# Patient Record
Sex: MALE | Race: WHITE | Employment: FULL TIME | URBAN - METROPOLITAN AREA
[De-identification: names, ages, dates, MRNs, and addresses within clinical notes are randomized per-mention and may not be internally consistent; named-entity substitution may affect disease eponyms.]

---

## 2017-09-20 ENCOUNTER — HOSPITAL ENCOUNTER (OUTPATIENT)
Age: 47
Discharge: HOME | End: 2017-09-20
Payer: COMMERCIAL

## 2017-09-20 LAB
PROSTATE SPECIFIC ANTIGEN: 1.57 UG/L
SEX HORMONE BINDING GLOBULIN: 30 NMOL/L (ref 11–80)
TESTOSTERONE FREE-NONMALE: 100.2 PG/ML (ref 47–244)
TESTOSTERONE TOTAL: 458 NG/DL (ref 220–1000)

## 2017-09-20 PROCEDURE — 84270 ASSAY OF SEX HORMONE GLOBUL: CPT

## 2017-09-20 PROCEDURE — 36415 COLL VENOUS BLD VENIPUNCTURE: CPT

## 2017-09-20 PROCEDURE — 84403 ASSAY OF TOTAL TESTOSTERONE: CPT

## 2017-09-20 PROCEDURE — G0103 PSA SCREENING: HCPCS

## 2021-05-07 ENCOUNTER — HOSPITAL ENCOUNTER (OUTPATIENT)
Age: 51
Discharge: HOME | End: 2021-05-07
Payer: COMMERCIAL

## 2021-05-07 LAB
ABSOLUTE EOS #: 0.1 K/UL (ref 0–0.4)
ABSOLUTE IMMATURE GRANULOCYTE: NORMAL K/UL (ref 0–0.3)
ABSOLUTE LYMPH #: 2 K/UL (ref 1–4.8)
ABSOLUTE MONO #: 0.4 K/UL (ref 0.1–1.3)
ALBUMIN SERPL-MCNC: 4.6 G/DL (ref 3.5–5.2)
ALBUMIN/GLOBULIN RATIO: ABNORMAL (ref 1–2.5)
ALP BLD-CCNC: 90 U/L (ref 40–129)
ALT SERPL-CCNC: 25 U/L (ref 5–41)
ANION GAP SERPL CALCULATED.3IONS-SCNC: 12 MMOL/L (ref 9–17)
AST SERPL-CCNC: 20 U/L
BASOPHILS # BLD: 1 % (ref 0–2)
BASOPHILS ABSOLUTE: 0 K/UL (ref 0–0.2)
BILIRUB SERPL-MCNC: 0.46 MG/DL (ref 0.3–1.2)
BUN BLDV-MCNC: 9 MG/DL (ref 6–20)
BUN/CREAT BLD: ABNORMAL (ref 9–20)
CALCIUM SERPL-MCNC: 9 MG/DL (ref 8.6–10.4)
CHLORIDE BLD-SCNC: 105 MMOL/L (ref 98–107)
CHOLESTEROL/HDL RATIO: 3.6
CHOLESTEROL: 228 MG/DL
CO2: 23 MMOL/L (ref 20–31)
CREAT SERPL-MCNC: 0.74 MG/DL (ref 0.7–1.2)
DIFFERENTIAL TYPE: NORMAL
EOSINOPHILS RELATIVE PERCENT: 2 % (ref 0–4)
GFR AFRICAN AMERICAN: >60 ML/MIN
GFR NON-AFRICAN AMERICAN: >60 ML/MIN
GFR SERPL CREATININE-BSD FRML MDRD: ABNORMAL ML/MIN/{1.73_M2}
GFR SERPL CREATININE-BSD FRML MDRD: ABNORMAL ML/MIN/{1.73_M2}
GLUCOSE BLD-MCNC: 106 MG/DL (ref 70–99)
HCT VFR BLD CALC: 46.5 % (ref 41–53)
HDLC SERPL-MCNC: 63 MG/DL
HEMOGLOBIN: 15.4 G/DL (ref 13.5–17.5)
IMMATURE GRANULOCYTES: NORMAL %
LDL CHOLESTEROL: 142 MG/DL (ref 0–130)
LYMPHOCYTES # BLD: 34 % (ref 24–44)
MCH RBC QN AUTO: 31 PG (ref 26–34)
MCHC RBC AUTO-ENTMCNC: 33.1 G/DL (ref 31–37)
MCV RBC AUTO: 93.8 FL (ref 80–100)
MONOCYTES # BLD: 7 % (ref 1–7)
NRBC AUTOMATED: NORMAL PER 100 WBC
PDW BLD-RTO: 13.1 % (ref 11.5–14.9)
PLATELET # BLD: 245 K/UL (ref 150–450)
PLATELET ESTIMATE: NORMAL
PMV BLD AUTO: 7.7 FL (ref 6–12)
POTASSIUM SERPL-SCNC: 4.4 MMOL/L (ref 3.7–5.3)
PROSTATE SPECIFIC ANTIGEN: 1.41 UG/L
RBC # BLD: 4.96 M/UL (ref 4.5–5.9)
RBC # BLD: NORMAL 10*6/UL
SEG NEUTROPHILS: 56 % (ref 36–66)
SEGMENTED NEUTROPHILS ABSOLUTE COUNT: 3.2 K/UL (ref 1.3–9.1)
SODIUM BLD-SCNC: 140 MMOL/L (ref 135–144)
TOTAL PROTEIN: 7.5 G/DL (ref 6.4–8.3)
TRIGL SERPL-MCNC: 116 MG/DL
VLDLC SERPL CALC-MCNC: ABNORMAL MG/DL (ref 1–30)
WBC # BLD: 5.8 K/UL (ref 3.5–11)
WBC # BLD: NORMAL 10*3/UL

## 2021-05-07 PROCEDURE — 36415 COLL VENOUS BLD VENIPUNCTURE: CPT

## 2021-05-07 PROCEDURE — 80053 COMPREHEN METABOLIC PANEL: CPT

## 2021-05-07 PROCEDURE — 80061 LIPID PANEL: CPT

## 2021-05-07 PROCEDURE — G0103 PSA SCREENING: HCPCS

## 2021-05-07 PROCEDURE — 85025 COMPLETE CBC W/AUTO DIFF WBC: CPT

## 2022-08-23 ENCOUNTER — HOSPITAL ENCOUNTER (OUTPATIENT)
Dept: GENERAL RADIOLOGY | Age: 52
Discharge: HOME | DRG: 988 | End: 2022-08-25
Payer: COMMERCIAL

## 2022-08-23 ENCOUNTER — HOSPITAL ENCOUNTER (OUTPATIENT)
Age: 52
Discharge: HOME | DRG: 988 | End: 2022-08-25
Payer: COMMERCIAL

## 2022-08-23 ENCOUNTER — HOSPITAL ENCOUNTER (INPATIENT)
Age: 52
LOS: 4 days | Discharge: HOME | DRG: 988 | End: 2022-08-28
Attending: STUDENT IN AN ORGANIZED HEALTH CARE EDUCATION/TRAINING PROGRAM | Admitting: FAMILY MEDICINE
Payer: COMMERCIAL

## 2022-08-23 ENCOUNTER — HOSPITAL ENCOUNTER (OUTPATIENT)
Age: 52
Discharge: HOME | DRG: 988 | End: 2022-08-23
Payer: COMMERCIAL

## 2022-08-23 DIAGNOSIS — R10.9 STOMACH ACHE: ICD-10-CM

## 2022-08-23 DIAGNOSIS — K62.89 PROCTITIS: Primary | ICD-10-CM

## 2022-08-23 LAB
ABSOLUTE EOS #: 0.1 K/UL (ref 0–0.4)
ABSOLUTE LYMPH #: 1.6 K/UL (ref 1–4.8)
ABSOLUTE MONO #: 0.9 K/UL (ref 0.1–1.3)
ALBUMIN SERPL-MCNC: 4.2 G/DL (ref 3.5–5.2)
ALP BLD-CCNC: 110 U/L (ref 40–129)
ALT SERPL-CCNC: 22 U/L (ref 5–41)
ANION GAP SERPL CALCULATED.3IONS-SCNC: 10 MMOL/L (ref 9–17)
AST SERPL-CCNC: 19 U/L
BASOPHILS # BLD: 1 % (ref 0–2)
BASOPHILS ABSOLUTE: 0.1 K/UL (ref 0–0.2)
BILIRUB SERPL-MCNC: 0.81 MG/DL (ref 0.3–1.2)
BUN BLDV-MCNC: 8 MG/DL (ref 6–20)
CALCIUM SERPL-MCNC: 9.2 MG/DL (ref 8.6–10.4)
CHLORIDE BLD-SCNC: 100 MMOL/L (ref 98–107)
CO2: 28 MMOL/L (ref 20–31)
CREAT SERPL-MCNC: 0.78 MG/DL (ref 0.7–1.2)
EOSINOPHILS RELATIVE PERCENT: 1 % (ref 0–4)
GFR AFRICAN AMERICAN: >60 ML/MIN
GFR NON-AFRICAN AMERICAN: >60 ML/MIN
GFR SERPL CREATININE-BSD FRML MDRD: ABNORMAL ML/MIN/{1.73_M2}
GLUCOSE BLD-MCNC: 121 MG/DL (ref 70–99)
HCT VFR BLD CALC: 41.6 % (ref 41–53)
HEMOGLOBIN: 13.7 G/DL (ref 13.5–17.5)
LYMPHOCYTES # BLD: 18 % (ref 24–44)
MCH RBC QN AUTO: 31 PG (ref 26–34)
MCHC RBC AUTO-ENTMCNC: 32.8 G/DL (ref 31–37)
MCV RBC AUTO: 94.4 FL (ref 80–100)
MONOCYTES # BLD: 10 % (ref 1–7)
PDW BLD-RTO: 13.2 % (ref 11.5–14.9)
PLATELET # BLD: 228 K/UL (ref 150–450)
PMV BLD AUTO: 7.9 FL (ref 6–12)
POTASSIUM SERPL-SCNC: 4.1 MMOL/L (ref 3.7–5.3)
RBC # BLD: 4.41 M/UL (ref 4.5–5.9)
SEG NEUTROPHILS: 70 % (ref 36–66)
SEGMENTED NEUTROPHILS ABSOLUTE COUNT: 6.2 K/UL (ref 1.3–9.1)
SODIUM BLD-SCNC: 138 MMOL/L (ref 135–144)
TOTAL PROTEIN: 7.2 G/DL (ref 6.4–8.3)
WBC # BLD: 8.8 K/UL (ref 3.5–11)

## 2022-08-23 PROCEDURE — 96375 TX/PRO/DX INJ NEW DRUG ADDON: CPT

## 2022-08-23 PROCEDURE — 36415 COLL VENOUS BLD VENIPUNCTURE: CPT

## 2022-08-23 PROCEDURE — 96361 HYDRATE IV INFUSION ADD-ON: CPT

## 2022-08-23 PROCEDURE — 99285 EMERGENCY DEPT VISIT HI MDM: CPT

## 2022-08-23 PROCEDURE — 96376 TX/PRO/DX INJ SAME DRUG ADON: CPT

## 2022-08-23 PROCEDURE — 85025 COMPLETE CBC W/AUTO DIFF WBC: CPT

## 2022-08-23 PROCEDURE — 80053 COMPREHEN METABOLIC PANEL: CPT

## 2022-08-23 PROCEDURE — 74018 RADEX ABDOMEN 1 VIEW: CPT

## 2022-08-23 PROCEDURE — 96374 THER/PROPH/DIAG INJ IV PUSH: CPT

## 2022-08-23 RX ORDER — ATORVASTATIN CALCIUM 40 MG/1
40 TABLET, FILM COATED ORAL DAILY
COMMUNITY

## 2022-08-23 RX ORDER — CIPROFLOXACIN 500 MG/1
500 TABLET, FILM COATED ORAL 2 TIMES DAILY
Status: ON HOLD | COMMUNITY
End: 2022-08-26 | Stop reason: HOSPADM

## 2022-08-23 ASSESSMENT — LIFESTYLE VARIABLES
HOW MANY STANDARD DRINKS CONTAINING ALCOHOL DO YOU HAVE ON A TYPICAL DAY: 7 TO 9
HOW OFTEN DO YOU HAVE A DRINK CONTAINING ALCOHOL: 4 OR MORE TIMES A WEEK

## 2022-08-23 ASSESSMENT — PAIN - FUNCTIONAL ASSESSMENT: PAIN_FUNCTIONAL_ASSESSMENT: 0-10

## 2022-08-23 ASSESSMENT — PAIN DESCRIPTION - LOCATION: LOCATION: ABDOMEN

## 2022-08-23 ASSESSMENT — PAIN SCALES - GENERAL: PAINLEVEL_OUTOF10: 5

## 2022-08-23 ASSESSMENT — PAIN DESCRIPTION - ORIENTATION: ORIENTATION: LEFT;RIGHT;LOWER

## 2022-08-23 NOTE — LETTER
[address] [facility] [address]  Phone: [phone]             August 28, 2022    Patient: Richard Carrera   YOB: 1970   Date of Visit: 8/23/2022       To Whom It May Concern:    Chey Suarez was seen and treated in our facility beginning 8/23/2022 until 8/28/22.  He cannot return to work until he has followed up with his surgeon and received clearance will be up to two weeks off until 9/12/22      Sincerely,       Mona Hsu RN        Signature:__________________________________

## 2022-08-24 ENCOUNTER — APPOINTMENT (OUTPATIENT)
Dept: CT IMAGING | Age: 52
DRG: 988 | End: 2022-08-24
Payer: COMMERCIAL

## 2022-08-24 PROBLEM — K62.89 PROCTITIS: Status: ACTIVE | Noted: 2022-08-24

## 2022-08-24 LAB
ABSOLUTE EOS #: 0.1 K/UL (ref 0–0.4)
ABSOLUTE LYMPH #: 1.3 K/UL (ref 1–4.8)
ABSOLUTE MONO #: 1.1 K/UL (ref 0.1–1.3)
ALBUMIN SERPL-MCNC: 4.2 G/DL (ref 3.5–5.2)
ALP BLD-CCNC: 110 U/L (ref 40–129)
ALT SERPL-CCNC: 23 U/L (ref 5–41)
ANION GAP SERPL CALCULATED.3IONS-SCNC: 12 MMOL/L (ref 9–17)
AST SERPL-CCNC: 20 U/L
BASOPHILS # BLD: 1 % (ref 0–2)
BASOPHILS ABSOLUTE: 0.1 K/UL (ref 0–0.2)
BILIRUB SERPL-MCNC: 0.69 MG/DL (ref 0.3–1.2)
BILIRUBIN URINE: NEGATIVE
BUN BLDV-MCNC: 8 MG/DL (ref 6–20)
CALCIUM SERPL-MCNC: 9.3 MG/DL (ref 8.6–10.4)
CHLORIDE BLD-SCNC: 100 MMOL/L (ref 98–107)
CO2: 22 MMOL/L (ref 20–31)
COLOR: YELLOW
COMMENT UA: NORMAL
CREAT SERPL-MCNC: 0.68 MG/DL (ref 0.7–1.2)
EOSINOPHILS RELATIVE PERCENT: 1 % (ref 0–4)
GFR AFRICAN AMERICAN: >60 ML/MIN
GFR NON-AFRICAN AMERICAN: >60 ML/MIN
GFR SERPL CREATININE-BSD FRML MDRD: ABNORMAL ML/MIN/{1.73_M2}
GLUCOSE BLD-MCNC: 150 MG/DL (ref 70–99)
GLUCOSE URINE: NEGATIVE
HCT VFR BLD CALC: 41.2 % (ref 41–53)
HEMOGLOBIN: 13.8 G/DL (ref 13.5–17.5)
KETONES, URINE: NEGATIVE
LACTIC ACID: 0.7 MMOL/L (ref 0.5–2.2)
LEUKOCYTE ESTERASE, URINE: NEGATIVE
LIPASE: 15 U/L (ref 13–60)
LYMPHOCYTES # BLD: 12 % (ref 24–44)
MAGNESIUM: 2 MG/DL (ref 1.6–2.6)
MCH RBC QN AUTO: 31.4 PG (ref 26–34)
MCHC RBC AUTO-ENTMCNC: 33.5 G/DL (ref 31–37)
MCV RBC AUTO: 93.7 FL (ref 80–100)
MONOCYTES # BLD: 10 % (ref 1–7)
NITRITE, URINE: NEGATIVE
PDW BLD-RTO: 12.9 % (ref 11.5–14.9)
PH UA: 5 (ref 5–8)
PLATELET # BLD: 238 K/UL (ref 150–450)
PMV BLD AUTO: 7.5 FL (ref 6–12)
POTASSIUM SERPL-SCNC: 3.9 MMOL/L (ref 3.7–5.3)
PROTEIN UA: NEGATIVE
RBC # BLD: 4.4 M/UL (ref 4.5–5.9)
SEG NEUTROPHILS: 76 % (ref 36–66)
SEGMENTED NEUTROPHILS ABSOLUTE COUNT: 8.7 K/UL (ref 1.3–9.1)
SODIUM BLD-SCNC: 134 MMOL/L (ref 135–144)
SPECIFIC GRAVITY UA: 1.02 (ref 1–1.03)
TOTAL PROTEIN: 7.4 G/DL (ref 6.4–8.3)
TURBIDITY: CLEAR
URINE HGB: NEGATIVE
UROBILINOGEN, URINE: NORMAL
WBC # BLD: 11.3 K/UL (ref 3.5–11)

## 2022-08-24 PROCEDURE — 81003 URINALYSIS AUTO W/O SCOPE: CPT

## 2022-08-24 PROCEDURE — 6360000002 HC RX W HCPCS: Performed by: SURGERY

## 2022-08-24 PROCEDURE — 6360000002 HC RX W HCPCS: Performed by: FAMILY MEDICINE

## 2022-08-24 PROCEDURE — C9113 INJ PANTOPRAZOLE SODIUM, VIA: HCPCS | Performed by: SURGERY

## 2022-08-24 PROCEDURE — 2580000003 HC RX 258: Performed by: STUDENT IN AN ORGANIZED HEALTH CARE EDUCATION/TRAINING PROGRAM

## 2022-08-24 PROCEDURE — 2580000003 HC RX 258: Performed by: SURGERY

## 2022-08-24 PROCEDURE — 74177 CT ABD & PELVIS W/CONTRAST: CPT

## 2022-08-24 PROCEDURE — 6360000004 HC RX CONTRAST MEDICATION: Performed by: STUDENT IN AN ORGANIZED HEALTH CARE EDUCATION/TRAINING PROGRAM

## 2022-08-24 PROCEDURE — 6360000002 HC RX W HCPCS: Performed by: STUDENT IN AN ORGANIZED HEALTH CARE EDUCATION/TRAINING PROGRAM

## 2022-08-24 PROCEDURE — 83690 ASSAY OF LIPASE: CPT

## 2022-08-24 PROCEDURE — 36415 COLL VENOUS BLD VENIPUNCTURE: CPT

## 2022-08-24 PROCEDURE — 96376 TX/PRO/DX INJ SAME DRUG ADON: CPT

## 2022-08-24 PROCEDURE — 96374 THER/PROPH/DIAG INJ IV PUSH: CPT

## 2022-08-24 PROCEDURE — 83605 ASSAY OF LACTIC ACID: CPT

## 2022-08-24 PROCEDURE — 99285 EMERGENCY DEPT VISIT HI MDM: CPT

## 2022-08-24 PROCEDURE — 96375 TX/PRO/DX INJ NEW DRUG ADDON: CPT

## 2022-08-24 PROCEDURE — 51702 INSERT TEMP BLADDER CATH: CPT

## 2022-08-24 PROCEDURE — 96361 HYDRATE IV INFUSION ADD-ON: CPT

## 2022-08-24 PROCEDURE — 83735 ASSAY OF MAGNESIUM: CPT

## 2022-08-24 PROCEDURE — 1200000000 HC SEMI PRIVATE

## 2022-08-24 PROCEDURE — A4216 STERILE WATER/SALINE, 10 ML: HCPCS | Performed by: SURGERY

## 2022-08-24 PROCEDURE — 85025 COMPLETE CBC W/AUTO DIFF WBC: CPT

## 2022-08-24 PROCEDURE — 80053 COMPREHEN METABOLIC PANEL: CPT

## 2022-08-24 RX ORDER — MORPHINE SULFATE 4 MG/ML
4 INJECTION, SOLUTION INTRAMUSCULAR; INTRAVENOUS
Status: DISCONTINUED | OUTPATIENT
Start: 2022-08-24 | End: 2022-08-24

## 2022-08-24 RX ORDER — FENTANYL CITRATE 50 UG/ML
50 INJECTION, SOLUTION INTRAMUSCULAR; INTRAVENOUS
Status: DISCONTINUED | OUTPATIENT
Start: 2022-08-24 | End: 2022-08-28 | Stop reason: HOSPADM

## 2022-08-24 RX ORDER — SODIUM CHLORIDE 9 MG/ML
INJECTION, SOLUTION INTRAVENOUS PRN
Status: DISCONTINUED | OUTPATIENT
Start: 2022-08-24 | End: 2022-08-28 | Stop reason: HOSPADM

## 2022-08-24 RX ORDER — ONDANSETRON 4 MG/1
4 TABLET, ORALLY DISINTEGRATING ORAL EVERY 8 HOURS PRN
Status: DISCONTINUED | OUTPATIENT
Start: 2022-08-24 | End: 2022-08-24 | Stop reason: SDUPTHER

## 2022-08-24 RX ORDER — SODIUM CHLORIDE 9 MG/ML
INJECTION, SOLUTION INTRAVENOUS CONTINUOUS
Status: DISCONTINUED | OUTPATIENT
Start: 2022-08-24 | End: 2022-08-28 | Stop reason: HOSPADM

## 2022-08-24 RX ORDER — FENTANYL CITRATE 50 UG/ML
100 INJECTION, SOLUTION INTRAMUSCULAR; INTRAVENOUS
Status: DISCONTINUED | OUTPATIENT
Start: 2022-08-24 | End: 2022-08-28 | Stop reason: HOSPADM

## 2022-08-24 RX ORDER — ACETAMINOPHEN 325 MG/1
650 TABLET ORAL EVERY 4 HOURS PRN
Status: DISCONTINUED | OUTPATIENT
Start: 2022-08-24 | End: 2022-08-24 | Stop reason: SDUPTHER

## 2022-08-24 RX ORDER — 0.9 % SODIUM CHLORIDE 0.9 %
100 INTRAVENOUS SOLUTION INTRAVENOUS ONCE
Status: COMPLETED | OUTPATIENT
Start: 2022-08-24 | End: 2022-08-24

## 2022-08-24 RX ORDER — SODIUM CHLORIDE 0.9 % (FLUSH) 0.9 %
10 SYRINGE (ML) INJECTION PRN
Status: DISCONTINUED | OUTPATIENT
Start: 2022-08-24 | End: 2022-08-24

## 2022-08-24 RX ORDER — SODIUM CHLORIDE 0.9 % (FLUSH) 0.9 %
5-40 SYRINGE (ML) INJECTION PRN
Status: DISCONTINUED | OUTPATIENT
Start: 2022-08-24 | End: 2022-08-28 | Stop reason: HOSPADM

## 2022-08-24 RX ORDER — 0.9 % SODIUM CHLORIDE 0.9 %
1000 INTRAVENOUS SOLUTION INTRAVENOUS ONCE
Status: COMPLETED | OUTPATIENT
Start: 2022-08-24 | End: 2022-08-24

## 2022-08-24 RX ORDER — FENTANYL CITRATE 50 UG/ML
100 INJECTION, SOLUTION INTRAMUSCULAR; INTRAVENOUS
Status: DISCONTINUED | OUTPATIENT
Start: 2022-08-24 | End: 2022-08-24

## 2022-08-24 RX ORDER — ONDANSETRON 2 MG/ML
4 INJECTION INTRAMUSCULAR; INTRAVENOUS EVERY 6 HOURS PRN
Status: DISCONTINUED | OUTPATIENT
Start: 2022-08-24 | End: 2022-08-28 | Stop reason: HOSPADM

## 2022-08-24 RX ORDER — ACETAMINOPHEN 325 MG/1
650 TABLET ORAL EVERY 4 HOURS PRN
Status: DISCONTINUED | OUTPATIENT
Start: 2022-08-24 | End: 2022-08-28 | Stop reason: HOSPADM

## 2022-08-24 RX ORDER — SODIUM CHLORIDE 0.9 % (FLUSH) 0.9 %
5-40 SYRINGE (ML) INJECTION EVERY 12 HOURS SCHEDULED
Status: DISCONTINUED | OUTPATIENT
Start: 2022-08-24 | End: 2022-08-28 | Stop reason: HOSPADM

## 2022-08-24 RX ORDER — ONDANSETRON 2 MG/ML
4 INJECTION INTRAMUSCULAR; INTRAVENOUS EVERY 6 HOURS PRN
Status: DISCONTINUED | OUTPATIENT
Start: 2022-08-24 | End: 2022-08-24 | Stop reason: SDUPTHER

## 2022-08-24 RX ORDER — ENOXAPARIN SODIUM 100 MG/ML
30 INJECTION SUBCUTANEOUS 2 TIMES DAILY
Status: DISCONTINUED | OUTPATIENT
Start: 2022-08-24 | End: 2022-08-26

## 2022-08-24 RX ORDER — MORPHINE SULFATE 2 MG/ML
2 INJECTION, SOLUTION INTRAMUSCULAR; INTRAVENOUS
Status: DISCONTINUED | OUTPATIENT
Start: 2022-08-24 | End: 2022-08-24

## 2022-08-24 RX ORDER — FENTANYL CITRATE 50 UG/ML
50 INJECTION, SOLUTION INTRAMUSCULAR; INTRAVENOUS
Status: DISCONTINUED | OUTPATIENT
Start: 2022-08-24 | End: 2022-08-24

## 2022-08-24 RX ORDER — MORPHINE SULFATE 4 MG/ML
4 INJECTION, SOLUTION INTRAMUSCULAR; INTRAVENOUS
Status: COMPLETED | OUTPATIENT
Start: 2022-08-24 | End: 2022-08-24

## 2022-08-24 RX ORDER — MORPHINE SULFATE 4 MG/ML
4 INJECTION, SOLUTION INTRAMUSCULAR; INTRAVENOUS ONCE
Status: COMPLETED | OUTPATIENT
Start: 2022-08-24 | End: 2022-08-24

## 2022-08-24 RX ADMIN — FENTANYL CITRATE 100 MCG: 50 INJECTION, SOLUTION INTRAMUSCULAR; INTRAVENOUS at 19:02

## 2022-08-24 RX ADMIN — MORPHINE SULFATE 4 MG: 4 INJECTION, SOLUTION INTRAMUSCULAR; INTRAVENOUS at 00:58

## 2022-08-24 RX ADMIN — PIPERACILLIN AND TAZOBACTAM 3375 MG: 3; .375 INJECTION, POWDER, FOR SOLUTION INTRAVENOUS at 12:49

## 2022-08-24 RX ADMIN — SODIUM CHLORIDE, PRESERVATIVE FREE 10 ML: 5 INJECTION INTRAVENOUS at 02:36

## 2022-08-24 RX ADMIN — FENTANYL CITRATE 100 MCG: 50 INJECTION, SOLUTION INTRAMUSCULAR; INTRAVENOUS at 21:17

## 2022-08-24 RX ADMIN — FENTANYL CITRATE 100 MCG: 50 INJECTION, SOLUTION INTRAMUSCULAR; INTRAVENOUS at 12:28

## 2022-08-24 RX ADMIN — IOPAMIDOL 75 ML: 755 INJECTION, SOLUTION INTRAVENOUS at 02:36

## 2022-08-24 RX ADMIN — MORPHINE SULFATE 4 MG: 4 INJECTION, SOLUTION INTRAMUSCULAR; INTRAVENOUS at 02:19

## 2022-08-24 RX ADMIN — ENOXAPARIN SODIUM 30 MG: 100 INJECTION SUBCUTANEOUS at 21:17

## 2022-08-24 RX ADMIN — PIPERACILLIN AND TAZOBACTAM 3375 MG: 3; .375 INJECTION, POWDER, FOR SOLUTION INTRAVENOUS at 21:19

## 2022-08-24 RX ADMIN — HYDROMORPHONE HYDROCHLORIDE 1 MG: 1 INJECTION, SOLUTION INTRAMUSCULAR; INTRAVENOUS; SUBCUTANEOUS at 04:10

## 2022-08-24 RX ADMIN — SODIUM CHLORIDE 40 MG: 9 INJECTION, SOLUTION INTRAMUSCULAR; INTRAVENOUS; SUBCUTANEOUS at 08:31

## 2022-08-24 RX ADMIN — SODIUM CHLORIDE 1000 ML: 9 INJECTION, SOLUTION INTRAVENOUS at 03:40

## 2022-08-24 RX ADMIN — SODIUM CHLORIDE 100 ML: 9 INJECTION, SOLUTION INTRAVENOUS at 02:36

## 2022-08-24 RX ADMIN — PIPERACILLIN AND TAZOBACTAM 4500 MG: 4; .5 INJECTION, POWDER, FOR SOLUTION INTRAVENOUS at 03:58

## 2022-08-24 RX ADMIN — FENTANYL CITRATE 100 MCG: 50 INJECTION, SOLUTION INTRAMUSCULAR; INTRAVENOUS at 08:31

## 2022-08-24 RX ADMIN — SODIUM CHLORIDE: 9 INJECTION, SOLUTION INTRAVENOUS at 12:46

## 2022-08-24 RX ADMIN — SODIUM CHLORIDE: 9 INJECTION, SOLUTION INTRAVENOUS at 08:34

## 2022-08-24 RX ADMIN — SODIUM CHLORIDE 1000 ML: 9 INJECTION, SOLUTION INTRAVENOUS at 00:58

## 2022-08-24 ASSESSMENT — PAIN DESCRIPTION - DESCRIPTORS
DESCRIPTORS: ACHING;SHARP
DESCRIPTORS: CRAMPING
DESCRIPTORS: ACHING;STABBING;THROBBING
DESCRIPTORS: ACHING;STABBING
DESCRIPTORS: CRAMPING

## 2022-08-24 ASSESSMENT — ENCOUNTER SYMPTOMS
EYE REDNESS: 0
SHORTNESS OF BREATH: 0
CHEST TIGHTNESS: 0
CONSTIPATION: 1
RHINORRHEA: 0
EYE DISCHARGE: 0
NAUSEA: 0
VOMITING: 0
SORE THROAT: 0
DIARRHEA: 0
ABDOMINAL PAIN: 1

## 2022-08-24 ASSESSMENT — PAIN DESCRIPTION - LOCATION
LOCATION: ABDOMEN
LOCATION: BUTTOCKS;ABDOMEN
LOCATION: ABDOMEN

## 2022-08-24 ASSESSMENT — PAIN DESCRIPTION - ORIENTATION
ORIENTATION: LEFT
ORIENTATION: LOWER
ORIENTATION: LOWER
ORIENTATION: MID
ORIENTATION: LOWER

## 2022-08-24 ASSESSMENT — PAIN SCALES - GENERAL
PAINLEVEL_OUTOF10: 8
PAINLEVEL_OUTOF10: 7
PAINLEVEL_OUTOF10: 9
PAINLEVEL_OUTOF10: 7

## 2022-08-24 NOTE — H&P
[facility]    HISTORY AND PHYSICAL EXAMINATION            Date:   8/24/2022  Patient name:  Rd Cohen  Date of admission:  8/23/2022 11:55 PM  MRN:   [id number]  Account:  [de-identified]  YOB: 1970  PCP:    Kt Castro DO  Room:   2045/2045-01  Code Status:    Full Code    Chief Complaint:     Chief Complaint   Patient presents with    Abdominal Pain    Constipation       History Obtained From:     patient, electronic medical record    History of Present Illness:     Rd Cohen is a 46 y.o. Non- / non  male who presents with Abdominal Pain and Constipation   and is admitted to the hospital for the management of Proctitis. Location-lowe abdomen  Worse with-nothing  Relieved with-pain medications  Quality- pressure like  Severity-moderate  Duration-4-5 days  Timing-continuous  Patient states that he felt constipated and took stool softeners and laxative but did not improve his symptoms, he did have small bowel movement yesterday and passed some gas. On arrival to the ER, his vitals were found to be stable. Labs  were unremarkable. CT abdomen showed findings compatible with severe proctitis. A small rectal abscess. No bowel obstruction. Past Medical History:     Past Medical History:   Diagnosis Date    GERD (gastroesophageal reflux disease)     not treated        Past Surgical History:     Past Surgical History:   Procedure Laterality Date    ABDOMEN SURGERY      laporoscopy  for acid reflux    BACK SURGERY      x3    OTHER SURGICAL HISTORY N/A 12 19 13    post cervical laminectomy C5-6 C 6-7    VASECTOMY          Medications Prior to Admission:     Prior to Admission medications    Medication Sig Start Date End Date Taking?  Authorizing Provider   atorvastatin (LIPITOR) 40 MG tablet Take 40 mg by mouth daily   Yes Historical Provider, MD   ciprofloxacin (CIPRO) 500 MG tablet Take 500 mg by mouth 2 times daily   Yes Historical Provider, MD   naproxen (NAPROSYN) 500 MG tablet Take 1 tablet by mouth 2 times daily (with meals). Lauraine Aver to resume on 12/27  Patient not taking: Reported on 8/23/2022 12/20/13   Devaughn Collet, MD   oxyCODONE-acetaminophen (PERCOCET) 5-325 MG per tablet 1-2 every 4-6 hours only as needed for pain (watch for sedation, constipation and nausea- ok to add  Laxative as needed)  Patient not taking: Reported on 8/23/2022 12/20/13   Devaughn Collet, MD   metaxalone (SKELAXIN) 800 MG tablet Take 800 mg by mouth 3 times daily. Patient not taking: Reported on 8/23/2022    Historical Provider, MD   varenicline (CHANTIX) 1 MG tablet Take 1 mg by mouth 2 times daily. Patient not taking: Reported on 8/23/2022    Historical Provider, MD        Allergies:     Patient has no known allergies. Social History:     Tobacco:    reports that he has quit smoking. His smoking use included cigarettes. He has a 30.00 pack-year smoking history. He does not have any smokeless tobacco history on file. Alcohol:      reports current alcohol use of about 5.0 standard drinks per week. Drug Use:  reports that he does not currently use drugs. Family History:     Family History   Problem Relation Age of Onset    Diabetes Mother     Cancer Father        Review of Systems:     Positive and Negative as described in HPI.     CONSTITUTIONAL:  negative for fevers, chills, sweats, fatigue, weight loss  HEENT:  negative for vision, hearing changes, runny nose, throat pain  RESPIRATORY:  negative for shortness of breath, cough, congestion, wheezing  CARDIOVASCULAR:  negative for chest pain, palpitations  GASTROINTESTINAL:  negative for nausea, vomiting, diarrhea, constipation, change in bowel habits, abdominal pain   GENITOURINARY:  negative for difficulty of urination, burning with urination, frequency   INTEGUMENT:  negative for rash, skin lesions, easy bruising   HEMATOLOGIC/LYMPHATIC:  negative for swelling/edema   ALLERGIC/IMMUNOLOGIC: MCHC 32.8 31 - 37 g/dL    RDW 13.2 11.5 - 14.9 %    Platelets 252 652 - 124 k/uL    MPV 7.9 6.0 - 12.0 fL    Seg Neutrophils 70 (H) 36 - 66 %    Lymphocytes 18 (L) 24 - 44 %    Monocytes 10 (H) 1 - 7 %    Eosinophils % 1 0 - 4 %    Basophils 1 0 - 2 %    Segs Absolute 6.20 1.3 - 9.1 k/uL    Absolute Lymph # 1.60 1.0 - 4.8 k/uL    Absolute Mono # 0.90 0.1 - 1.3 k/uL    Absolute Eos # 0.10 0.0 - 0.4 k/uL    Basophils Absolute 0.10 0.0 - 0.2 k/uL   Comprehensive Metabolic Panel    Collection Time: 08/23/22 10:00 AM   Result Value Ref Range    Glucose 121 (H) 70 - 99 mg/dL    BUN 8 6 - 20 mg/dL    Creatinine 0.78 0.70 - 1.20 mg/dL    Calcium 9.2 8.6 - 10.4 mg/dL    Sodium 138 135 - 144 mmol/L    Potassium 4.1 3.7 - 5.3 mmol/L    Chloride 100 98 - 107 mmol/L    CO2 28 20 - 31 mmol/L    Anion Gap 10 9 - 17 mmol/L    Alkaline Phosphatase 110 40 - 129 U/L    ALT 22 5 - 41 U/L    AST 19 <40 U/L    Total Bilirubin 0.81 0.3 - 1.2 mg/dL    Total Protein 7.2 6.4 - 8.3 g/dL    Albumin 4.2 3.5 - 5.2 g/dL    GFR Non-African American >60 >60 mL/min    GFR African American >60 >60 mL/min    GFR Comment         CBC with Auto Differential    Collection Time: 08/24/22  1:05 AM   Result Value Ref Range    WBC 11.3 (H) 3.5 - 11.0 k/uL    RBC 4.40 (L) 4.5 - 5.9 m/uL    Hemoglobin 13.8 13.5 - 17.5 g/dL    Hematocrit 41.2 41 - 53 %    MCV 93.7 80 - 100 fL    MCH 31.4 26 - 34 pg    MCHC 33.5 31 - 37 g/dL    RDW 12.9 11.5 - 14.9 %    Platelets 661 615 - 585 k/uL    MPV 7.5 6.0 - 12.0 fL    Seg Neutrophils 76 (H) 36 - 66 %    Lymphocytes 12 (L) 24 - 44 %    Monocytes 10 (H) 1 - 7 %    Eosinophils % 1 0 - 4 %    Basophils 1 0 - 2 %    Segs Absolute 8.70 1.3 - 9.1 k/uL    Absolute Lymph # 1.30 1.0 - 4.8 k/uL    Absolute Mono # 1.10 0.1 - 1.3 k/uL    Absolute Eos # 0.10 0.0 - 0.4 k/uL    Basophils Absolute 0.10 0.0 - 0.2 k/uL   CMP    Collection Time: 08/24/22  1:05 AM   Result Value Ref Range    Glucose 150 (H) 70 - 99 mg/dL    BUN 8 6 - 20 mg/dL    Creatinine 0.68 (L) 0.70 - 1.20 mg/dL    Calcium 9.3 8.6 - 10.4 mg/dL    Sodium 134 (L) 135 - 144 mmol/L    Potassium 3.9 3.7 - 5.3 mmol/L    Chloride 100 98 - 107 mmol/L    CO2 22 20 - 31 mmol/L    Anion Gap 12 9 - 17 mmol/L    Alkaline Phosphatase 110 40 - 129 U/L    ALT 23 5 - 41 U/L    AST 20 <40 U/L    Total Bilirubin 0.69 0.3 - 1.2 mg/dL    Total Protein 7.4 6.4 - 8.3 g/dL    Albumin 4.2 3.5 - 5.2 g/dL    GFR Non-African American >60 >60 mL/min    GFR African American >60 >60 mL/min    GFR Comment         Lipase    Collection Time: 08/24/22  1:05 AM   Result Value Ref Range    Lipase 15 13 - 60 U/L   Magnesium    Collection Time: 08/24/22  1:05 AM   Result Value Ref Range    Magnesium 2.0 1.6 - 2.6 mg/dL   Lactic Acid    Collection Time: 08/24/22  1:05 AM   Result Value Ref Range    Lactic Acid 0.7 0.5 - 2.2 mmol/L   Urinalysis with Reflex to Culture    Collection Time: 08/24/22  2:02 AM    Specimen: Urine, clean catch   Result Value Ref Range    Color, UA Yellow Yellow    Turbidity UA Clear Clear    Glucose, Ur NEGATIVE NEGATIVE    Bilirubin Urine NEGATIVE NEGATIVE    Ketones, Urine NEGATIVE NEGATIVE    Specific [place], UA 1.016 1.000 - 1.030    Urine Hgb NEGATIVE NEGATIVE    pH, UA 5.0 5.0 - 8.0    Protein, UA NEGATIVE NEGATIVE    Urobilinogen, Urine Normal Normal    Nitrite, Urine NEGATIVE NEGATIVE    Leukocyte Esterase, Urine NEGATIVE NEGATIVE    Urinalysis Comments       Microscopic exam not performed based on chemical results unless requested in original order. Imaging/Diagnostics:  XR ABDOMEN (KUB) (SINGLE AP VIEW)    Result Date: 8/23/2022  Nonspecific KUB     CT ABDOMEN PELVIS W IV CONTRAST Additional Contrast? None    Result Date: 8/24/2022  1. Findings compatible with severe proctitis of the distal rectum.   A small perirectal abscess may present just superior to the anal verge at the 6 o'clock position, although visualization is limited and this also could represent intraluminal

## 2022-08-24 NOTE — PLAN OF CARE
Problem: Discharge Planning  Goal: Discharge to home or other facility with appropriate resources  8/24/2022 1656 by Morris Jacobsen RN  Outcome: Progressing  8/24/2022 0617 by Grace Mane RN  Outcome: Progressing     Problem: Pain  Goal: Verbalizes/displays adequate comfort level or baseline comfort level  8/24/2022 1656 by Morris Jacobsen RN  Outcome: Progressing  8/24/2022 0617 by rGace Mane RN  Outcome: Progressing

## 2022-08-24 NOTE — ED NOTES
Report given to ZINA johnson from ed. Report method by phone   The following was reviewed with receiving RN:   Current vital signs:  /68   Pulse 78   Temp 98.5 °F (36.9 °C) (Oral)   Resp 20   Ht 6' 1\" (1.854 m)   Wt 246 lb (111.6 kg)   SpO2 93%   BMI 32.46 kg/m²                      Any medication or safety alerts were reviewed. Any pending diagnostics and notifications were also reviewed, as well as any safety concerns or issues, abnormal labs, abnormal imaging, and abnormal assessment findings. Questions were answered.             Eric North RN  08/24/22 9197

## 2022-08-24 NOTE — ED TRIAGE NOTES
Pt was having pressure for the whole last week rectally and scrotal. Pt was seen by Dr. Alexandre Thomas. Pt had last BM last thursday. Pt was told his prostate was inflamed. Pt took prune juice and had a BM movement, liquid. Lower abdominal pain bilaterally. Pt states he noted a fever 101.4 tonight.  Pt had x-rays completed today prior to miralax

## 2022-08-24 NOTE — CARE COORDINATION
CASE MANAGEMENT NOTE:    Admission Date:  8/23/2022 Naila Burgess is a 46 y.o.  male    Admitted for : Proctitis [K62.89]    Met with:  Patient    PCP:  [address]:  [facility]      Is patient alert and oriented at time of discussion:  Yes    Current Residence/ Living Arrangements:  independently at home             Current Services PTA:  No    Does patient go to outpatient dialysis: No  If yes, location and chair time: NA  Who is their nephrologist? NA    Is patient agreeable to VNS: No    Freedom of choice provided:  No    List of [address] provided: No    VNS chosen:  No    DME:  none    Home Oxygen: No    Nebulizer: No    CPAP/BIPAP: No    Supplier: N/A    Potential Assistance Needed: No    SNF needed: No    Freedom of choice and list provided: No    Pharmacy:  [facility]        Is patient currently receiving oral anticoagulation therapy? No    Is the Patient an [facility] [place] with Readmission Risk Score greater than 14%? No  If yes, pt needs a follow up appointment made within 7 days. Family Members/Caregivers that pt would like involved in their care:    Yes    If yes, list name here:  spouse Jacobo Barraza    Transportation Provider:  Family             Discharge Plan:  8/24/22 [facility] From home with spouse, independent DME:none VNS:none pt denies needs for discharge. Clear liquids, consult to Dr Eather Ormond , IV zosyn.  Following for needs//JF                Electronically signed by: Victoriano Maravilla RN on 8/24/2022 at 12:04 PM

## 2022-08-24 NOTE — ED PROVIDER NOTES
EMERGENCY DEPARTMENT ENCOUNTER    Pt Name: Chantel Sawyer  MRN: [id number]  [place] 1970  Date of evaluation: 8/24/22  CHIEF COMPLAINT       Chief Complaint   Patient presents with    Abdominal Pain    Constipation     HISTORY OF PRESENT ILLNESS   51-year-old presents with abdominal pain    Lower abdominal pain    Difficulty with bowel movements for several days    Having some loose bowel meds occasionally    Had a fever this evening so came in    No dysuria hematuria frequency    No chest pain, shortness of breath    Lower moderate abdominal pain nonradiating            REVIEW OF SYSTEMS     Review of Systems   Constitutional:  Negative for chills and fever. HENT:  Negative for rhinorrhea and sore throat. Eyes:  Negative for discharge and redness. Respiratory:  Negative for chest tightness and shortness of breath. Cardiovascular:  Negative for chest pain. Gastrointestinal:  Positive for abdominal pain and constipation. Negative for diarrhea, nausea and vomiting. Genitourinary:  Negative for dysuria and frequency. Musculoskeletal:  Negative for arthralgias and myalgias. Skin:  Negative for rash. Neurological:  Negative for weakness and numbness. Psychiatric/Behavioral:  Negative for suicidal ideas. All other systems reviewed and are negative.   PASTMEDICAL HISTORY     Past Medical History:   Diagnosis Date    GERD (gastroesophageal reflux disease)     not treated     Past Problem List  Patient Active Problem List   Diagnosis Code    Herniated cervical disc without myelopathy M50.20    Proctitis K62.89     SURGICAL HISTORY       Past Surgical History:   Procedure Laterality Date    ABDOMEN SURGERY      laporoscopy  for acid reflux    BACK SURGERY      x3    OTHER SURGICAL HISTORY N/A 12 19 13    post cervical laminectomy C5-6 C 6-7    VASECTOMY       CURRENT MEDICATIONS       Previous Medications    ATORVASTATIN (LIPITOR) 40 MG TABLET    Take 40 mg by mouth daily    CIPROFLOXACIN (CIPRO) 500 MG TABLET    Take 500 mg by mouth 2 times daily    METAXALONE (SKELAXIN) 800 MG TABLET    Take 800 mg by mouth 3 times daily. NAPROXEN (NAPROSYN) 500 MG TABLET    Take 1 tablet by mouth 2 times daily (with meals). Ok to resume on 12/27    OXYCODONE-ACETAMINOPHEN (PERCOCET) 5-325 MG PER TABLET    1-2 every 4-6 hours only as needed for pain (watch for sedation, constipation and nausea- ok to add  Laxative as needed)    VARENICLINE (CHANTIX) 1 MG TABLET    Take 1 mg by mouth 2 times daily. ALLERGIES     has No Known Allergies. FAMILY HISTORY     has no family status information on file. SOCIAL HISTORY       Social History     Tobacco Use    Smoking status: Former     Packs/day: 1.50     Years: 20.00     Pack years: 30.00     Types: Cigarettes    Tobacco comments:     down to three cigarettes per day   Substance Use Topics    Alcohol use: Yes     Alcohol/week: 5.0 standard drinks     Types: 5 Cans of beer per week     Comment: daily    Drug use: Not Currently     PHYSICAL EXAM     INITIAL VITALS: /71   Pulse 78   Temp 98.5 °F (36.9 °C) (Oral)   Resp 20   Ht 6' 1\" (1.854 m)   Wt 246 lb (111.6 kg)   SpO2 93%   BMI 32.46 kg/m²    Physical Exam  Vitals and nursing note reviewed. Constitutional:       Appearance: Normal appearance. HENT:      Head: Normocephalic and atraumatic. Nose: Nose normal.      Mouth/Throat:      Mouth: Mucous membranes are moist.   Eyes:      Conjunctiva/sclera: Conjunctivae normal.      Pupils: Pupils are equal, round, and reactive to light. Cardiovascular:      Rate and Rhythm: Normal rate and regular rhythm. Pulses: Normal pulses. Heart sounds: Normal heart sounds. Pulmonary:      Effort: Pulmonary effort is normal.      Breath sounds: Normal breath sounds. Abdominal:      Palpations: Abdomen is soft. Tenderness: There is abdominal tenderness in the right lower quadrant, suprapubic area and left lower quadrant.    Musculoskeletal: General: No swelling or deformity. Cervical back: Normal range of motion. Skin:     General: Skin is warm. Findings: No rash. Neurological:      General: No focal deficit present. Mental Status: He is alert and oriented to person, place, and time. Psychiatric:         Mood and Affect: Mood normal.       MEDICAL DECISION MAKIN-year-old lower abdominal pain constipation    Differential diverticulitis, appendicitis, UTI, perforation, obstruction    Labs and CT  ED Course as of 08/24/22 0408   Wed Aug 24, 2022   0153 CBC with Auto Differential(!):    WBC 11.3(!)   RBC 4.40(!)   Hemoglobin Quant 13.8   Hematocrit 41.2   MCV 93.7   MCH 31.4   MCHC 33.5   RDW 12.9   Platelet Count 983   MPV 7.5   Seg Neutrophils 76(!)   Lymphocytes 12(!)   Monocytes 10(!)   Eosinophils % 1   Basophils 1   Segs Absolute 8.70   Absolute Lymph # 1.30   Absolute Mono # 1.10   Absolute Eos # 0.10   Basophils Absolute 0.10  Mild leukocytosis to 11.3 otherwise unremarkable [FREDY]   0153 CMP(!):    Glucose, Random 150(!)   BUN,BUNPL 8   Creatinine 0.68(!)   CALCIUM, SERUM, 182271 9.3   Sodium 134(!)   Potassium 3.9   Chloride 100   CO2 22   Anion Gap 12   Alk Phos 110   ALT 23   AST 20   Bilirubin 0.69   Total Protein 7.4   Albumin 4.2   GFR Non- >60   GFR  >60   GFR Comment       Unremarkable [FREDY]   0154 Lipase:    Lipase 15  Normal [FREDY]   0154 Lactic Acid:    Lactic Acid 0.7  Negative [FREDY]   1186 Discussed with Dr. Kizzy Silver will give IV abx and admit [FREDY]   0405 Discussed with NP for Dr. Binta avila.  Accepts admission [FREDY]      ED Course User Index  [FREDY] Martita Gabriel MD       CRITICAL CARE:       PROCEDURES:    Procedures    DIAGNOSTIC RESULTS   EKG:All EKG's are interpreted by the Emergency Department Physician who either signs or Co-signs this chart in the absence of a cardiologist.        RADIOLOGY:All plain film, CT, MRI, and formal ultrasound images (except ED bedside ultrasound) are read by the radiologist, see reports below, unless otherwisenoted in MDM or here. CT ABDOMEN PELVIS W IV CONTRAST Additional Contrast? None   Final Result   1. Findings compatible with severe proctitis of the distal rectum. A small   perirectal abscess may present just superior to the anal verge at the 6   o'clock position, although visualization is limited and this also could   represent intraluminal fluid. Further evaluation with direct visualization   is recommended to exclude an underlying neoplasm. 2. Mild distention of the proximal colon with gas and liquid stool. No small   bowel obstruction. LABS: All lab results were reviewed by myself, and all abnormals are listed below.   Labs Reviewed   CBC WITH AUTO DIFFERENTIAL - Abnormal; Notable for the following components:       Result Value    WBC 11.3 (*)     RBC 4.40 (*)     Seg Neutrophils 76 (*)     Lymphocytes 12 (*)     Monocytes 10 (*)     All other components within normal limits   COMPREHENSIVE METABOLIC PANEL - Abnormal; Notable for the following components:    Glucose 150 (*)     Creatinine 0.68 (*)     Sodium 134 (*)     All other components within normal limits   LIPASE   MAGNESIUM   URINALYSIS WITH REFLEX TO CULTURE   LACTIC ACID       EMERGENCY DEPARTMENTCOURSE:     Patient was seen and evaluated for some lower abdominal pain and pain with bowel movements    Found to have a severe proctitis with a possible perirectal abscess    Given IV antibiotics and admitted for surgical evaluation        Vitals:    Vitals:    08/23/22 2147 08/24/22 0101 08/24/22 0330   BP: 134/70  121/71   Pulse: 78     Resp: 20     Temp: 99 °F (37.2 °C) 99.9 °F (37.7 °C) 98.5 °F (36.9 °C)   TempSrc: Oral Oral Oral   SpO2: 96%  93%   Weight: 246 lb (111.6 kg)     Height: 6' 1\" (1.854 m)         The patient was given the following medications while in the emergency department:  Orders Placed This Encounter   Medications    0.9 % sodium chloride bolus morphine sulfate (PF) injection 4 mg    morphine sulfate (PF) injection 4 mg    0.9 % sodium chloride bolus    iopamidol (ISOVUE-370) 76 % injection 75 mL    sodium chloride flush 0.9 % injection 10 mL    piperacillin-tazobactam (ZOSYN) 4,500 mg in dextrose 5 % 100 mL IVPB (mini-bag)     Order Specific Question:   Antimicrobial Indications     Answer:   Intra-Abdominal Infection    0.9 % sodium chloride bolus    HYDROmorphone (DILAUDID) injection 1 mg    sodium chloride flush 0.9 % injection 5-40 mL    sodium chloride flush 0.9 % injection 5-40 mL    0.9 % sodium chloride infusion    enoxaparin Sodium (LOVENOX) injection 30 mg     Order Specific Question:   Indication of Use     Answer:   Prophylaxis-DVT/PE    acetaminophen (TYLENOL) tablet 650 mg    OR Linked Order Group     ondansetron (ZOFRAN-ODT) disintegrating tablet 4 mg     ondansetron (ZOFRAN) injection 4 mg    OR Linked Order Group     morphine (PF) injection 2 mg     morphine sulfate (PF) injection 4 mg     CONSULTS:  IP CONSULT TO GENERAL SURGERY  IP CONSULT TO GENERAL SURGERY  IP CONSULT TO INTERNAL MEDICINE    FINAL IMPRESSION      1. Proctitis          DISPOSITION/PLAN   DISPOSITION Admitted 08/24/2022 04:05:04 AM      PATIENT REFERRED TO:  No follow-up provider specified. DISCHARGE MEDICATIONS:  New Prescriptions    No medications on file     The care is provided during an unprecedented national emergency due to the novel coronavirus, COVID 19.   MD Anthony Jensen MD  08/24/22 8068

## 2022-08-24 NOTE — PROGRESS NOTES
Pt arrived to unit from ER. Pt A&O x4. Vitals completed. No distress noted. Safety measures in place. Will continue to monitor.

## 2022-08-25 LAB
ABSOLUTE EOS #: 0.1 K/UL (ref 0–0.4)
ABSOLUTE LYMPH #: 1 K/UL (ref 1–4.8)
ABSOLUTE MONO #: 1.2 K/UL (ref 0.1–1.3)
ANION GAP SERPL CALCULATED.3IONS-SCNC: 8 MMOL/L (ref 9–17)
BASOPHILS # BLD: 0 % (ref 0–2)
BASOPHILS ABSOLUTE: 0 K/UL (ref 0–0.2)
BILIRUBIN URINE: NEGATIVE
BUN BLDV-MCNC: 6 MG/DL (ref 6–20)
CALCIUM SERPL-MCNC: 8.9 MG/DL (ref 8.6–10.4)
CHLORIDE BLD-SCNC: 101 MMOL/L (ref 98–107)
CO2: 27 MMOL/L (ref 20–31)
COLOR: YELLOW
COMMENT UA: NORMAL
CREAT SERPL-MCNC: 0.73 MG/DL (ref 0.7–1.2)
EOSINOPHILS RELATIVE PERCENT: 1 % (ref 0–4)
GFR AFRICAN AMERICAN: >60 ML/MIN
GFR NON-AFRICAN AMERICAN: >60 ML/MIN
GFR SERPL CREATININE-BSD FRML MDRD: ABNORMAL ML/MIN/{1.73_M2}
GLUCOSE BLD-MCNC: 134 MG/DL (ref 70–99)
GLUCOSE URINE: NEGATIVE
HCT VFR BLD CALC: 39.6 % (ref 41–53)
HEMOGLOBIN: 13.2 G/DL (ref 13.5–17.5)
KETONES, URINE: NEGATIVE
LEUKOCYTE ESTERASE, URINE: NEGATIVE
LYMPHOCYTES # BLD: 10 % (ref 24–44)
MCH RBC QN AUTO: 31.4 PG (ref 26–34)
MCHC RBC AUTO-ENTMCNC: 33.5 G/DL (ref 31–37)
MCV RBC AUTO: 93.9 FL (ref 80–100)
MONOCYTES # BLD: 11 % (ref 1–7)
NITRITE, URINE: NEGATIVE
PDW BLD-RTO: 12.9 % (ref 11.5–14.9)
PH UA: 5.5 (ref 5–8)
PLATELET # BLD: 220 K/UL (ref 150–450)
PMV BLD AUTO: 7.8 FL (ref 6–12)
POTASSIUM SERPL-SCNC: 3.8 MMOL/L (ref 3.7–5.3)
PROTEIN UA: NEGATIVE
RBC # BLD: 4.21 M/UL (ref 4.5–5.9)
SEG NEUTROPHILS: 78 % (ref 36–66)
SEGMENTED NEUTROPHILS ABSOLUTE COUNT: 8.3 K/UL (ref 1.3–9.1)
SODIUM BLD-SCNC: 136 MMOL/L (ref 135–144)
SPECIFIC GRAVITY UA: 1.01 (ref 1–1.03)
TURBIDITY: CLEAR
URINE HGB: NEGATIVE
UROBILINOGEN, URINE: NORMAL
WBC # BLD: 10.6 K/UL (ref 3.5–11)

## 2022-08-25 PROCEDURE — 85025 COMPLETE CBC W/AUTO DIFF WBC: CPT

## 2022-08-25 PROCEDURE — 2580000003 HC RX 258: Performed by: SURGERY

## 2022-08-25 PROCEDURE — 6360000002 HC RX W HCPCS: Performed by: SURGERY

## 2022-08-25 PROCEDURE — A4216 STERILE WATER/SALINE, 10 ML: HCPCS | Performed by: SURGERY

## 2022-08-25 PROCEDURE — C9113 INJ PANTOPRAZOLE SODIUM, VIA: HCPCS | Performed by: SURGERY

## 2022-08-25 PROCEDURE — 36415 COLL VENOUS BLD VENIPUNCTURE: CPT

## 2022-08-25 PROCEDURE — 1200000000 HC SEMI PRIVATE

## 2022-08-25 PROCEDURE — 93005 ELECTROCARDIOGRAM TRACING: CPT | Performed by: SURGERY

## 2022-08-25 PROCEDURE — 80048 BASIC METABOLIC PNL TOTAL CA: CPT

## 2022-08-25 PROCEDURE — 6370000000 HC RX 637 (ALT 250 FOR IP): Performed by: SURGERY

## 2022-08-25 PROCEDURE — 81003 URINALYSIS AUTO W/O SCOPE: CPT

## 2022-08-25 PROCEDURE — 2580000003 HC RX 258: Performed by: FAMILY MEDICINE

## 2022-08-25 RX ORDER — POLYETHYLENE GLYCOL 3350 17 G/17G
238 POWDER, FOR SOLUTION ORAL ONCE
Status: COMPLETED | OUTPATIENT
Start: 2022-08-25 | End: 2022-08-25

## 2022-08-25 RX ADMIN — FENTANYL CITRATE 100 MCG: 50 INJECTION, SOLUTION INTRAMUSCULAR; INTRAVENOUS at 08:39

## 2022-08-25 RX ADMIN — SODIUM CHLORIDE: 9 INJECTION, SOLUTION INTRAVENOUS at 12:57

## 2022-08-25 RX ADMIN — FENTANYL CITRATE 100 MCG: 50 INJECTION, SOLUTION INTRAMUSCULAR; INTRAVENOUS at 12:55

## 2022-08-25 RX ADMIN — PIPERACILLIN AND TAZOBACTAM 3375 MG: 3; .375 INJECTION, POWDER, FOR SOLUTION INTRAVENOUS at 12:58

## 2022-08-25 RX ADMIN — SODIUM CHLORIDE, PRESERVATIVE FREE 10 ML: 5 INJECTION INTRAVENOUS at 21:06

## 2022-08-25 RX ADMIN — FENTANYL CITRATE 100 MCG: 50 INJECTION, SOLUTION INTRAMUSCULAR; INTRAVENOUS at 05:02

## 2022-08-25 RX ADMIN — PIPERACILLIN AND TAZOBACTAM 3375 MG: 3; .375 INJECTION, POWDER, FOR SOLUTION INTRAVENOUS at 21:06

## 2022-08-25 RX ADMIN — FENTANYL CITRATE 100 MCG: 50 INJECTION, SOLUTION INTRAMUSCULAR; INTRAVENOUS at 22:40

## 2022-08-25 RX ADMIN — SODIUM CHLORIDE 40 MG: 9 INJECTION, SOLUTION INTRAMUSCULAR; INTRAVENOUS; SUBCUTANEOUS at 08:39

## 2022-08-25 RX ADMIN — PIPERACILLIN AND TAZOBACTAM 3375 MG: 3; .375 INJECTION, POWDER, FOR SOLUTION INTRAVENOUS at 05:04

## 2022-08-25 RX ADMIN — SODIUM CHLORIDE: 9 INJECTION, SOLUTION INTRAVENOUS at 12:49

## 2022-08-25 RX ADMIN — POLYETHYLENE GLYCOL 3350 238 G: 17 POWDER, FOR SOLUTION ORAL at 15:27

## 2022-08-25 RX ADMIN — FENTANYL CITRATE 100 MCG: 50 INJECTION, SOLUTION INTRAMUSCULAR; INTRAVENOUS at 18:36

## 2022-08-25 ASSESSMENT — PAIN SCALES - GENERAL
PAINLEVEL_OUTOF10: 8
PAINLEVEL_OUTOF10: 7
PAINLEVEL_OUTOF10: 8

## 2022-08-25 ASSESSMENT — PAIN DESCRIPTION - LOCATION
LOCATION: ABDOMEN;RECTUM
LOCATION: BUTTOCKS;ABDOMEN

## 2022-08-25 ASSESSMENT — PAIN DESCRIPTION - DESCRIPTORS
DESCRIPTORS: ACHING;CRAMPING;SHARP
DESCRIPTORS: ACHING;SHARP
DESCRIPTORS: ACHING;CRAMPING;SHARP;PRESSURE

## 2022-08-25 ASSESSMENT — PAIN - FUNCTIONAL ASSESSMENT
PAIN_FUNCTIONAL_ASSESSMENT: ACTIVITIES ARE NOT PREVENTED
PAIN_FUNCTIONAL_ASSESSMENT: ACTIVITIES ARE NOT PREVENTED

## 2022-08-25 ASSESSMENT — PAIN DESCRIPTION - ORIENTATION
ORIENTATION: LOWER
ORIENTATION: LOWER

## 2022-08-25 NOTE — PROGRESS NOTES
Pt called RN to room and notified RN that he was unable to urinate. Stated his flow and steam has decreased since this pain, however now he cannot urinate after two attempts. Bladder scan showed greater than 660 ml. RN spoke with covering provider, Dr Aga Montez, okay to insert hinojosa.

## 2022-08-25 NOTE — PROGRESS NOTES
[address] [place] [address]    Progress Note    8/25/2022    9:00 AM    Name:   Marimar Laura  MRN:     [id number]     Acct:      [de-identified]   Room:   2045/2045-01   Day:  1  Admit Date:  8/23/2022 11:55 PM    PCP:   Melly Salcedo DO  Code Status:  Full Code    Subjective:     C/C:   Chief Complaint   Patient presents with    Abdominal Pain    Constipation     Interval History Status: not changed. Patient states that he continues to have pain in the lower abdomen and also his rectal area is very irritated and painful  Afebrile  BP stable  Respiratory status stable on room air  No leucocytosis  Hemoglobin stable  Platelets stable  Renal function  stable      Review of Systems:     Constitutional:  negative for chills, fevers, sweats  Respiratory:  negative for cough, dyspnea on exertion, shortness of breath, wheezing  Cardiovascular:  negative for chest pain, chest pressure/discomfort, lower extremity edema, palpitations  Gastrointestinal:  negative for Hematemesis, diarrhea, vomiting  Neurological:  negative for dizziness, headache    Medications: Allergies:  No Known Allergies    Current Meds:   Scheduled Meds:    sodium chloride flush  5-40 mL IntraVENous 2 times per day    enoxaparin  30 mg SubCUTAneous BID    pantoprazole (PROTONIX) 40 mg injection  40 mg IntraVENous Daily    piperacillin-tazobactam  3,375 mg IntraVENous Q8H     Continuous Infusions:    sodium chloride      sodium chloride 150 mL/hr at 08/24/22 1246     PRN Meds: sodium chloride flush, sodium chloride, acetaminophen, ondansetron, fentanNYL **OR** fentanNYL    Data:     Past Medical History:   has a past medical history of GERD (gastroesophageal reflux disease). Social History:   reports that he has quit smoking. His smoking use included cigarettes. He has a 30.00 pack-year smoking history. He does not have any smokeless tobacco history on file.  He reports current alcohol use of about 5.0 standard drinks per week. He reports that he does not currently use drugs. Family History:   Family History   Problem Relation Age of Onset    Diabetes Mother     Cancer Father        Vitals:  BP (!) 143/90   Pulse 64   Temp 98.8 °F (37.1 °C)   Resp 16   Ht 6' 1\" (1.854 m)   Wt 246 lb (111.6 kg)   SpO2 95%   BMI 32.46 kg/m²   Temp (24hrs), Av.7 °F (37.1 °C), Min:98.1 °F (36.7 °C), Max:99.3 °F (37.4 °C)    No results for input(s): POCGLU in the last 72 hours. I/O (24Hr): Intake/Output Summary (Last 24 hours) at 2022 0900  Last data filed at 2022 0458  Gross per 24 hour   Intake --   Output 2200 ml   Net -2200 ml       Labs:  Hematology:  Recent Labs     22  1000 22  0105 22  0616   WBC 8.8 11.3* 10.6   RBC 4.41* 4.40* 4.21*   HGB 13.7 13.8 13.2*   HCT 41.6 41.2 39.6*   MCV 94.4 93.7 93.9   MCH 31.0 31.4 31.4   MCHC 32.8 33.5 33.5   RDW 13.2 12.9 12.9    238 220   MPV 7.9 7.5 7.8     Chemistry:  Recent Labs     22  1000 22  0105 22  0616    134* 136   K 4.1 3.9 3.8    100 101   CO2 28 22 27   GLUCOSE 121* 150* 134*   BUN 8 8 6   CREATININE 0.78 0.68* 0.73   MG  --  2.0  --    ANIONGAP 10 12 8*   LABGLOM >60 >60 >60   GFRAA >60 >60 >60   CALCIUM 9.2 9.3 8.9     Recent Labs     22  1000 22  0105   PROT 7.2 7.4   LABALBU 4.2 4.2   AST 19 20   ALT 22 23   ALKPHOS 110 110   BILITOT 0.81 0.69   LIPASE  --  15     ABG:No results found for: POCPH, PHART, PH, POCPCO2, JVU3VEL, PCO2, POCPO2, PO2ART, PO2, POCHCO3, RRK6LOZ, HCO3, NBEA, PBEA, BEART, BE, THGBART, THB, VVR0OGR, PZOC2VIY, E7IILECI, O2SAT, FIO2  No results found for: SPECIAL  No results found for: CULTURE    Radiology:  XR ABDOMEN (KUB) (SINGLE AP VIEW)    Result Date: 2022  Nonspecific KUB     CT ABDOMEN PELVIS W IV CONTRAST Additional Contrast? None    Result Date: 2022  1. Findings compatible with severe proctitis of the distal rectum.   A small perirectal abscess may present just superior to the anal verge at the 6 o'clock position, although visualization is limited and this also could represent intraluminal fluid. Further evaluation with direct visualization is recommended to exclude an underlying neoplasm. 2. Mild distention of the proximal colon with gas and liquid stool. No small bowel obstruction. Physical Examination:        General appearance:  alert, cooperative and no distress  Mental Status:  oriented to person, place and time and normal affect  Lungs:  clear to auscultation bilaterally, normal effort  Heart:  regular rate and rhythm, no murmur  Extremities:  no edema, redness, tenderness in the calves  Skin:  no gross lesions, rashes, induration    Assessment:        Hospital Problems             Last Modified POA    * (Principal) Proctitis 8/24/2022 Yes       Plan:         IV fluids. IV Zosyn. Normal saline at 150 mL/hour. Pain control- fentanyl p.r.n. surgery consulted. Patient currently on clear liquid diet. Continue Protonix.     Lovenox for DVT prophylaxis    Alfonso Purcell MD  8/25/2022  9:00 AM

## 2022-08-25 NOTE — CARE COORDINATION
ONGOING DISCHARGE PLAN:    Patient is alert and oriented x4. Spoke with patient regarding discharge plan and patient confirms that plan is still to go home with spouse with no needs. Vinson inserted overnight due to retention, IV zosyn, clear liquids, surgery consult     Will continue to follow for additional discharge needs.     Electronically signed by Val Chambers RN on 8/25/2022 at 9:39 AM

## 2022-08-25 NOTE — PLAN OF CARE
Problem: Discharge Planning  Goal: Discharge to home or other facility with appropriate resources  Outcome: Not Progressing  Flowsheets (Taken 8/25/2022 0900)  Discharge to home or other facility with appropriate resources: Identify barriers to discharge with patient and caregiver     Problem: Pain  Goal: Verbalizes/displays adequate comfort level or baseline comfort level  Outcome: Not Progressing  Flowsheets (Taken 8/25/2022 1823)  Verbalizes/displays adequate comfort level or baseline comfort level:   Encourage patient to monitor pain and request assistance   Assess pain using appropriate pain scale   Administer analgesics based on type and severity of pain and evaluate response     Problem: Discharge Planning  Goal: Discharge to home or other facility with appropriate resources  Outcome: Not Progressing  Flowsheets (Taken 8/25/2022 0900)  Discharge to home or other facility with appropriate resources: Identify barriers to discharge with patient and caregiver     Problem: Pain  Goal: Verbalizes/displays adequate comfort level or baseline comfort level  Outcome: Not Progressing  Flowsheets (Taken 8/25/2022 1823)  Verbalizes/displays adequate comfort level or baseline comfort level:   Encourage patient to monitor pain and request assistance   Assess pain using appropriate pain scale   Administer analgesics based on type and severity of pain and evaluate response

## 2022-08-25 NOTE — PROGRESS NOTES
Patient was seen and examined. Still having significant rectal pain. Afebrile vital signs are stable. Patient had urinary retention and catheter was placed. On clear liquids. Abdomen is soft. Extremity nontender. Blood work reviewed. BMP normal.  WBC count normal. Hemoglobin 13.2. Continue IV antibiotics. Bowel prep. For colonoscopy with examination under anesthesia tomorrow. Discussed with patient and the family at length. He understands and agrees.

## 2022-08-25 NOTE — CONSULTS
General Surgery Consult      Pt Name: Shakira Jackson  MRN: [id number]  YOB: 1970  Date of evaluation: 8/24/2022  Primary Care Physician: Harish Celeste DO   Patient evaluated at the request of  Dr. Miryam Zarate  Reason for evaluation: rectal pain    SUBJECTIVE:   History of Chief Complaint:    Shakira Jackson is a 46 y.o. male who presents with and rectal pain. patient was treated for prostatitis for the last few days. Pain got progressively worse. Patient is complaining of severe pain and spasm in the rectum. Some bleeding. Nothing significant. No purulent drainage. No fever chills. No urinary symptoms. Emergency room workup reviewed. Symptom onset has been gradual for a time period of few day(s). Severity is described as moderate to severe. Course of his symptoms over time is gradual.    Past Medical History   has a past medical history of GERD (gastroesophageal reflux disease). Past Surgical History   has a past surgical history that includes back surgery; other surgical history (N/A, 12 19 13); Vasectomy; and Abdomen surgery. Medications  Prior to Admission medications    Medication Sig Start Date End Date Taking? Authorizing Provider   atorvastatin (LIPITOR) 40 MG tablet Take 40 mg by mouth daily   Yes Historical Provider, MD   ciprofloxacin (CIPRO) 500 MG tablet Take 500 mg by mouth 2 times daily   Yes Historical Provider, MD   naproxen (NAPROSYN) 500 MG tablet Take 1 tablet by mouth 2 times daily (with meals). [id number] Daisy Platt to resume on 12/27  Patient not taking: Reported on 8/23/2022 12/20/13   Los Pack MD   oxyCODONE-acetaminophen (PERCOCET) 5-325 MG per tablet 1-2 every 4-6 hours only as needed for pain (watch for sedation, constipation and nausea- ok to add  Laxative as needed)  Patient not taking: Reported on 8/23/2022 12/20/13   Los Pack MD   metaxalone (SKELAXIN) 800 MG tablet Take 800 mg by mouth 3 times daily.   Patient not taking: Reported on 8/23/2022    Historical Provider, MD varenicline (CHANTIX) 1 MG tablet Take 1 mg by mouth 2 times daily. Patient not taking: Reported on 8/23/2022    Historical Provider, MD     Allergies  has No Known Allergies. Family History  family history includes Cancer in his father; Diabetes in his mother. Social History   reports that he has quit smoking. His smoking use included cigarettes. He has a 30.00 pack-year smoking history. He does not have any smokeless tobacco history on file. He reports current alcohol use of about 5.0 standard drinks per week. He reports that he does not currently use drugs. Review of Systems:  General Denies any fever or chills  HEENT Denies any diplopia, tinnitus or vertigo  Resp Denies any shortness of breath, cough or wheezing  Cardiac Denies any chest pain, palpitations, claudication or edema  GI Denies any melena, hematochezia, hematemesis or pyrosis   Denies any frequency, urgency, hesitancy or incontinence  Heme Denies bruising or bleeding easily  Endocrine Denies any history of diabetes or thyroid disease  Neuro Denies any focal motor or sensory deficits    OBJECTIVE:   VITALS:  height is 6' 1\" (1.854 m) and weight is 246 lb (111.6 kg). His temperature is 99.3 °F (37.4 °C). His blood pressure is 127/70 and his pulse is 66. His respiration is 16 and oxygen saturation is 95%. CONSTITUTIONAL: Alert and oriented times 3, no acute distress and cooperative to examination with proper mood and affect. SKIN: Skin color, texture, turgor normal. No rashes or lesions. LYMPH: no cervical nodes, no inguinal nodes  HEENT: Head is normocephalic, atraumatic. EOMI, PERRLA  NECK: Supple, symmetrical, trachea midline, no adenopathy, thyroid symmetric, not enlarged and no tenderness, skin normal  CHEST/LUNGS: chest symmetric with normal A/P diameter, normal respiratory rate and rhythm, lungs clear to auscultation without wheezes, rales or rhonchi. No accessory muscle use.  Scars None   CARDIOVASCULAR: Heart regular rate and rhythm Normal S1 and S2. . Carotid and femoral pulses 2+/4 and equal bilaterally  ABDOMEN: Normal shape. . No and Laparoscopic scar(s) present. Normal bowel sounds. No bruits. Soft, nondistended, no masses or organomegaly. no evidence of hernia. Percussion: Normal without hepatosplenomegally. Tenderness: absent  RECTAL: external rectal examination was performed that did not reveal any evidence of cellulitis. No obvious abscess. Digital examination deferred. Obvious fissures or fistulas. NEUROLOGIC: There are no focalizing motor or sensory deficits. CN II-XII are grossly intact.   EXTREMITIES: no cyanosis, no clubbing, and no edema    LABS:   CBC with Differential:    Lab Results   Component Value Date/Time    WBC 11.3 08/24/2022 01:05 AM    RBC 4.40 08/24/2022 01:05 AM    HGB 13.8 08/24/2022 01:05 AM    HCT 41.2 08/24/2022 01:05 AM     08/24/2022 01:05 AM    MCV 93.7 08/24/2022 01:05 AM    MCH 31.4 08/24/2022 01:05 AM    MCHC 33.5 08/24/2022 01:05 AM    RDW 12.9 08/24/2022 01:05 AM    LYMPHOPCT 12 08/24/2022 01:05 AM    MONOPCT 10 08/24/2022 01:05 AM    BASOPCT 1 08/24/2022 01:05 AM    MONOSABS 1.10 08/24/2022 01:05 AM    LYMPHSABS 1.30 08/24/2022 01:05 AM    EOSABS 0.10 08/24/2022 01:05 AM    BASOSABS 0.10 08/24/2022 01:05 AM    DIFFTYPE NOT REPORTED 05/07/2021 03:10 PM     BMP:    Lab Results   Component Value Date/Time     08/24/2022 01:05 AM    K 3.9 08/24/2022 01:05 AM     08/24/2022 01:05 AM    CO2 22 08/24/2022 01:05 AM    BUN 8 08/24/2022 01:05 AM    LABALBU 4.2 08/24/2022 01:05 AM    CREATININE 0.68 08/24/2022 01:05 AM    CALCIUM 9.3 08/24/2022 01:05 AM    GFRAA >60 08/24/2022 01:05 AM    LABGLOM >60 08/24/2022 01:05 AM    GLUCOSE 150 08/24/2022 01:05 AM     Hepatic Function Panel:    Lab Results   Component Value Date/Time    ALKPHOS 110 08/24/2022 01:05 AM    ALT 23 08/24/2022 01:05 AM    AST 20 08/24/2022 01:05 AM    PROT 7.4 08/24/2022 01:05 AM    BILITOT 0.69 08/24/2022 01:05 AM pt. c/o LLQ/RLQ pain with difficulty passing stool. pt. also has fever Relevant Medical/Surgical History: No previous hx. per pt FINDINGS: Lower Chest:  Visualized portion of the lower chest demonstrates no acute abnormality. Organs: Liver, gallbladder, spleen, adrenals, kidneys, pancreas, bile ducts, stomach are without acute process. Ureters are nondilated. The bladder is within normal limits. Normal prostate. GI/Bowel: Wall thickening is present in the distal rectum with adjacent fat stranding, such as on series 2, image 272, with extension to the anus. A small focus of fluid may be present at 6 o'clock measuring 2.2 x 1.5 cm on series 2, image 194. There is liquid stool in the remainder of the colon which is mildly distended proximally. The appendix is not well visualized. No evidence of small-bowel obstruction. Pelvis: See findings above. Peritoneum/Retroperitoneum: Mild atherosclerosis. Patent vasculature. Nonspecific presacral edema. No free fluid or free air. No lymphadenopathy. Bones/Soft Tissues: Tiny periumbilical hernia. No acute osseous abnormality. 1. Findings compatible with severe proctitis of the distal rectum. A small perirectal abscess may present just superior to the anal verge at the 6 o'clock position, although visualization is limited and this also could represent intraluminal fluid. Further evaluation with direct visualization is recommended to exclude an underlying neoplasm. 2. Mild distention of the proximal colon with gas and liquid stool. No small bowel obstruction. IMPRESSION:   Rectal pain. Severe  proctitis. No clinical evidence of abscess. does not have any pertinent problems on file. PLAN:   Liquid diet. Pain control. IV antibiotics. Repeat blood work in the morning. Conservative management. No acute surgical intervention today. Depending on his symptoms I will decide on proctoscopy/sigmoidoscopy.   Discussed with patient and family at length and the nursing staff. Patient is in agreement. Thank you for this interesting consult and for allowing us to participate in the care of this patient. If you have any questions please don't hesitate to call.           Electronically signed by Chester Blackwood MD  on 8/24/2022 at 8:47 PM

## 2022-08-26 ENCOUNTER — ANESTHESIA EVENT (OUTPATIENT)
Dept: OPERATING ROOM | Age: 52
DRG: 988 | End: 2022-08-26
Payer: COMMERCIAL

## 2022-08-26 ENCOUNTER — ANESTHESIA (OUTPATIENT)
Dept: OPERATING ROOM | Age: 52
DRG: 988 | End: 2022-08-26
Payer: COMMERCIAL

## 2022-08-26 LAB
ABSOLUTE EOS #: 0.1 K/UL (ref 0–0.4)
ABSOLUTE LYMPH #: 1.2 K/UL (ref 1–4.8)
ABSOLUTE MONO #: 1.2 K/UL (ref 0.1–1.3)
ANION GAP SERPL CALCULATED.3IONS-SCNC: 8 MMOL/L (ref 9–17)
BASOPHILS # BLD: 0 % (ref 0–2)
BASOPHILS ABSOLUTE: 0 K/UL (ref 0–0.2)
BUN BLDV-MCNC: 6 MG/DL (ref 6–20)
CALCIUM SERPL-MCNC: 8.6 MG/DL (ref 8.6–10.4)
CHLORIDE BLD-SCNC: 104 MMOL/L (ref 98–107)
CO2: 26 MMOL/L (ref 20–31)
CREAT SERPL-MCNC: 0.67 MG/DL (ref 0.7–1.2)
EKG ATRIAL RATE: 71 BPM
EKG P AXIS: 46 DEGREES
EKG P-R INTERVAL: 144 MS
EKG Q-T INTERVAL: 388 MS
EKG QRS DURATION: 104 MS
EKG QTC CALCULATION (BAZETT): 421 MS
EKG R AXIS: -16 DEGREES
EKG T AXIS: 38 DEGREES
EKG VENTRICULAR RATE: 71 BPM
EOSINOPHILS RELATIVE PERCENT: 1 % (ref 0–4)
GFR AFRICAN AMERICAN: >60 ML/MIN
GFR NON-AFRICAN AMERICAN: >60 ML/MIN
GFR SERPL CREATININE-BSD FRML MDRD: ABNORMAL ML/MIN/{1.73_M2}
GLUCOSE BLD-MCNC: 127 MG/DL (ref 70–99)
HCT VFR BLD CALC: 37.9 % (ref 41–53)
HEMOGLOBIN: 12.5 G/DL (ref 13.5–17.5)
LYMPHOCYTES # BLD: 10 % (ref 24–44)
MCH RBC QN AUTO: 30.9 PG (ref 26–34)
MCHC RBC AUTO-ENTMCNC: 32.9 G/DL (ref 31–37)
MCV RBC AUTO: 94 FL (ref 80–100)
MONOCYTES # BLD: 10 % (ref 1–7)
PDW BLD-RTO: 13 % (ref 11.5–14.9)
PLATELET # BLD: 236 K/UL (ref 150–450)
PMV BLD AUTO: 7.4 FL (ref 6–12)
POTASSIUM SERPL-SCNC: 4.1 MMOL/L (ref 3.7–5.3)
RBC # BLD: 4.03 M/UL (ref 4.5–5.9)
SEG NEUTROPHILS: 79 % (ref 36–66)
SEGMENTED NEUTROPHILS ABSOLUTE COUNT: 9.6 K/UL (ref 1.3–9.1)
SODIUM BLD-SCNC: 138 MMOL/L (ref 135–144)
WBC # BLD: 12.1 K/UL (ref 3.5–11)

## 2022-08-26 PROCEDURE — 2580000003 HC RX 258: Performed by: SURGERY

## 2022-08-26 PROCEDURE — 6360000002 HC RX W HCPCS: Performed by: SURGERY

## 2022-08-26 PROCEDURE — 2580000003 HC RX 258: Performed by: FAMILY MEDICINE

## 2022-08-26 PROCEDURE — 1200000000 HC SEMI PRIVATE

## 2022-08-26 PROCEDURE — 0H99XZZ DRAINAGE OF PERINEUM SKIN, EXTERNAL APPROACH: ICD-10-PCS | Performed by: SURGERY

## 2022-08-26 PROCEDURE — 80048 BASIC METABOLIC PNL TOTAL CA: CPT

## 2022-08-26 PROCEDURE — A4216 STERILE WATER/SALINE, 10 ML: HCPCS | Performed by: SURGERY

## 2022-08-26 PROCEDURE — 6370000000 HC RX 637 (ALT 250 FOR IP): Performed by: SURGERY

## 2022-08-26 PROCEDURE — 6360000002 HC RX W HCPCS: Performed by: ANESTHESIOLOGY

## 2022-08-26 PROCEDURE — 2500000003 HC RX 250 WO HCPCS: Performed by: NURSE ANESTHETIST, CERTIFIED REGISTERED

## 2022-08-26 PROCEDURE — 7100000001 HC PACU RECOVERY - ADDTL 15 MIN: Performed by: SURGERY

## 2022-08-26 PROCEDURE — 3600000002 HC SURGERY LEVEL 2 BASE: Performed by: SURGERY

## 2022-08-26 PROCEDURE — 2709999900 HC NON-CHARGEABLE SUPPLY: Performed by: SURGERY

## 2022-08-26 PROCEDURE — 3700000001 HC ADD 15 MINUTES (ANESTHESIA): Performed by: SURGERY

## 2022-08-26 PROCEDURE — 6360000002 HC RX W HCPCS: Performed by: NURSE ANESTHETIST, CERTIFIED REGISTERED

## 2022-08-26 PROCEDURE — 36415 COLL VENOUS BLD VENIPUNCTURE: CPT

## 2022-08-26 PROCEDURE — 0DJD8ZZ INSPECTION OF LOWER INTESTINAL TRACT, VIA NATURAL OR ARTIFICIAL OPENING ENDOSCOPIC: ICD-10-PCS | Performed by: SURGERY

## 2022-08-26 PROCEDURE — 3700000000 HC ANESTHESIA ATTENDED CARE: Performed by: SURGERY

## 2022-08-26 PROCEDURE — 7100000000 HC PACU RECOVERY - FIRST 15 MIN: Performed by: SURGERY

## 2022-08-26 PROCEDURE — C9113 INJ PANTOPRAZOLE SODIUM, VIA: HCPCS | Performed by: SURGERY

## 2022-08-26 PROCEDURE — 85025 COMPLETE CBC W/AUTO DIFF WBC: CPT

## 2022-08-26 PROCEDURE — 3600000012 HC SURGERY LEVEL 2 ADDTL 15MIN: Performed by: SURGERY

## 2022-08-26 RX ORDER — ACETAMINOPHEN 325 MG/1
650 TABLET ORAL EVERY 4 HOURS PRN
Status: DISCONTINUED | OUTPATIENT
Start: 2022-08-26 | End: 2022-08-26 | Stop reason: SDUPTHER

## 2022-08-26 RX ORDER — ONDANSETRON 4 MG/1
4 TABLET, FILM COATED ORAL DAILY PRN
Qty: 30 TABLET | Refills: 0 | Status: SHIPPED | OUTPATIENT
Start: 2022-08-26

## 2022-08-26 RX ORDER — CIPROFLOXACIN 500 MG/1
500 TABLET, FILM COATED ORAL 2 TIMES DAILY
Qty: 28 TABLET | Refills: 0 | Status: SHIPPED | OUTPATIENT
Start: 2022-08-26 | End: 2022-09-09

## 2022-08-26 RX ORDER — OXYCODONE HYDROCHLORIDE AND ACETAMINOPHEN 5; 325 MG/1; MG/1
1 TABLET ORAL EVERY 6 HOURS PRN
Qty: 28 TABLET | Refills: 0 | Status: SHIPPED | OUTPATIENT
Start: 2022-08-26 | End: 2022-09-02

## 2022-08-26 RX ORDER — SIMETHICONE 20 MG/.3ML
EMULSION ORAL PRN
Status: DISCONTINUED | OUTPATIENT
Start: 2022-08-26 | End: 2022-08-26 | Stop reason: ALTCHOICE

## 2022-08-26 RX ORDER — FENTANYL CITRATE 50 UG/ML
INJECTION, SOLUTION INTRAMUSCULAR; INTRAVENOUS PRN
Status: DISCONTINUED | OUTPATIENT
Start: 2022-08-26 | End: 2022-08-26 | Stop reason: SDUPTHER

## 2022-08-26 RX ORDER — ONDANSETRON 2 MG/ML
4 INJECTION INTRAMUSCULAR; INTRAVENOUS
Status: COMPLETED | OUTPATIENT
Start: 2022-08-26 | End: 2022-08-26

## 2022-08-26 RX ORDER — METRONIDAZOLE 500 MG/1
500 TABLET ORAL 3 TIMES DAILY
Qty: 42 TABLET | Refills: 0 | Status: SHIPPED | OUTPATIENT
Start: 2022-08-26 | End: 2022-09-09

## 2022-08-26 RX ORDER — LIDOCAINE HYDROCHLORIDE 20 MG/ML
INJECTION, SOLUTION EPIDURAL; INFILTRATION; INTRACAUDAL; PERINEURAL PRN
Status: DISCONTINUED | OUTPATIENT
Start: 2022-08-26 | End: 2022-08-26 | Stop reason: SDUPTHER

## 2022-08-26 RX ORDER — DOCUSATE SODIUM 100 MG/1
100 CAPSULE, LIQUID FILLED ORAL DAILY
Status: DISCONTINUED | OUTPATIENT
Start: 2022-08-26 | End: 2022-08-28 | Stop reason: HOSPADM

## 2022-08-26 RX ORDER — PROPOFOL 10 MG/ML
INJECTION, EMULSION INTRAVENOUS PRN
Status: DISCONTINUED | OUTPATIENT
Start: 2022-08-26 | End: 2022-08-26 | Stop reason: SDUPTHER

## 2022-08-26 RX ORDER — OXYCODONE HYDROCHLORIDE AND ACETAMINOPHEN 5; 325 MG/1; MG/1
1 TABLET ORAL EVERY 4 HOURS PRN
Status: DISCONTINUED | OUTPATIENT
Start: 2022-08-26 | End: 2022-08-28 | Stop reason: HOSPADM

## 2022-08-26 RX ORDER — ENOXAPARIN SODIUM 100 MG/ML
30 INJECTION SUBCUTANEOUS 2 TIMES DAILY
Status: DISCONTINUED | OUTPATIENT
Start: 2022-08-27 | End: 2022-08-28 | Stop reason: HOSPADM

## 2022-08-26 RX ORDER — FENTANYL CITRATE 50 UG/ML
50 INJECTION, SOLUTION INTRAMUSCULAR; INTRAVENOUS
Status: DISCONTINUED | OUTPATIENT
Start: 2022-08-26 | End: 2022-08-26 | Stop reason: SDUPTHER

## 2022-08-26 RX ORDER — FENTANYL CITRATE 50 UG/ML
100 INJECTION, SOLUTION INTRAMUSCULAR; INTRAVENOUS
Status: DISCONTINUED | OUTPATIENT
Start: 2022-08-26 | End: 2022-08-26 | Stop reason: SDUPTHER

## 2022-08-26 RX ADMIN — ONDANSETRON 4 MG: 2 INJECTION INTRAMUSCULAR; INTRAVENOUS at 17:12

## 2022-08-26 RX ADMIN — HYDROMORPHONE HYDROCHLORIDE 0.5 MG: 1 INJECTION, SOLUTION INTRAMUSCULAR; INTRAVENOUS; SUBCUTANEOUS at 17:44

## 2022-08-26 RX ADMIN — FENTANYL CITRATE 100 MCG: 50 INJECTION, SOLUTION INTRAMUSCULAR; INTRAVENOUS at 15:37

## 2022-08-26 RX ADMIN — SODIUM CHLORIDE, PRESERVATIVE FREE 10 ML: 5 INJECTION INTRAVENOUS at 08:16

## 2022-08-26 RX ADMIN — PIPERACILLIN AND TAZOBACTAM 3375 MG: 3; .375 INJECTION, POWDER, FOR SOLUTION INTRAVENOUS at 04:50

## 2022-08-26 RX ADMIN — PROPOFOL 200 MG: 10 INJECTION, EMULSION INTRAVENOUS at 15:37

## 2022-08-26 RX ADMIN — HYDROMORPHONE HYDROCHLORIDE 0.5 MG: 1 INJECTION, SOLUTION INTRAMUSCULAR; INTRAVENOUS; SUBCUTANEOUS at 17:14

## 2022-08-26 RX ADMIN — HYDROMORPHONE HYDROCHLORIDE 0.5 MG: 1 INJECTION, SOLUTION INTRAMUSCULAR; INTRAVENOUS; SUBCUTANEOUS at 17:36

## 2022-08-26 RX ADMIN — PIPERACILLIN AND TAZOBACTAM 3375 MG: 3; .375 INJECTION, POWDER, FOR SOLUTION INTRAVENOUS at 20:33

## 2022-08-26 RX ADMIN — FENTANYL CITRATE 100 MCG: 50 INJECTION, SOLUTION INTRAMUSCULAR; INTRAVENOUS at 05:29

## 2022-08-26 RX ADMIN — HYDROMORPHONE HYDROCHLORIDE 0.5 MG: 1 INJECTION, SOLUTION INTRAMUSCULAR; INTRAVENOUS; SUBCUTANEOUS at 17:08

## 2022-08-26 RX ADMIN — FENTANYL CITRATE 100 MCG: 50 INJECTION, SOLUTION INTRAMUSCULAR; INTRAVENOUS at 02:19

## 2022-08-26 RX ADMIN — FENTANYL CITRATE 100 MCG: 50 INJECTION, SOLUTION INTRAMUSCULAR; INTRAVENOUS at 10:52

## 2022-08-26 RX ADMIN — LIDOCAINE HYDROCHLORIDE 100 MG: 20 INJECTION, SOLUTION EPIDURAL; INFILTRATION; INTRACAUDAL; PERINEURAL at 15:37

## 2022-08-26 RX ADMIN — DOCUSATE SODIUM 100 MG: 100 CAPSULE, LIQUID FILLED ORAL at 20:29

## 2022-08-26 RX ADMIN — OXYCODONE AND ACETAMINOPHEN 1 TABLET: 5; 325 TABLET ORAL at 20:29

## 2022-08-26 RX ADMIN — SODIUM CHLORIDE, PRESERVATIVE FREE 10 ML: 5 INJECTION INTRAVENOUS at 20:29

## 2022-08-26 RX ADMIN — FENTANYL CITRATE 100 MCG: 50 INJECTION, SOLUTION INTRAMUSCULAR; INTRAVENOUS at 08:15

## 2022-08-26 RX ADMIN — PIPERACILLIN AND TAZOBACTAM 3375 MG: 3; .375 INJECTION, POWDER, FOR SOLUTION INTRAVENOUS at 13:10

## 2022-08-26 RX ADMIN — SODIUM CHLORIDE: 9 INJECTION, SOLUTION INTRAVENOUS at 02:17

## 2022-08-26 RX ADMIN — SODIUM CHLORIDE 40 MG: 9 INJECTION, SOLUTION INTRAMUSCULAR; INTRAVENOUS; SUBCUTANEOUS at 08:15

## 2022-08-26 RX ADMIN — SODIUM CHLORIDE: 9 INJECTION, SOLUTION INTRAVENOUS at 17:43

## 2022-08-26 ASSESSMENT — PAIN SCALES - GENERAL
PAINLEVEL_OUTOF10: 5
PAINLEVEL_OUTOF10: 6
PAINLEVEL_OUTOF10: 10
PAINLEVEL_OUTOF10: 9
PAINLEVEL_OUTOF10: 9
PAINLEVEL_OUTOF10: 10
PAINLEVEL_OUTOF10: 9
PAINLEVEL_OUTOF10: 7

## 2022-08-26 ASSESSMENT — PAIN DESCRIPTION - LOCATION
LOCATION: RECTUM
LOCATION: PERINEUM
LOCATION: RECTUM
LOCATION: ABDOMEN;BUTTOCKS
LOCATION: BUTTOCKS
LOCATION: RECTUM

## 2022-08-26 ASSESSMENT — PAIN DESCRIPTION - DESCRIPTORS
DESCRIPTORS: BURNING
DESCRIPTORS: BURNING;SHARP
DESCRIPTORS: SHARP
DESCRIPTORS: ACHING;DISCOMFORT
DESCRIPTORS: BURNING;SHARP
DESCRIPTORS: BURNING;SHARP
DESCRIPTORS: ACHING;DISCOMFORT
DESCRIPTORS: ACHING

## 2022-08-26 ASSESSMENT — PAIN DESCRIPTION - PAIN TYPE: TYPE: SURGICAL PAIN

## 2022-08-26 ASSESSMENT — ENCOUNTER SYMPTOMS
STRIDOR: 0
SHORTNESS OF BREATH: 0

## 2022-08-26 ASSESSMENT — LIFESTYLE VARIABLES: SMOKING_STATUS: 0

## 2022-08-26 ASSESSMENT — PAIN - FUNCTIONAL ASSESSMENT: PAIN_FUNCTIONAL_ASSESSMENT: 0-10

## 2022-08-26 NOTE — PROGRESS NOTES
Physician consult for Dr. Junito Blair covering physician Dr. Paco Lopez via Edmar Serve in regards to consult

## 2022-08-26 NOTE — ANESTHESIA PRE PROCEDURE
Department of Anesthesiology  Preprocedure Note       Name:  Lena Gilbert   Age:  46 y.o.  :  1970                                          MRN:  [id number]         Date:  2022      Surgeon: Ebony Sheppard):  Giuliana Harvey MD    Procedure: Procedure(s):  COLONOSCOPY DIAGNOSTIC WITH EUA    Medications prior to admission:   Prior to Admission medications    Medication Sig Start Date End Date Taking? Authorizing Provider   atorvastatin (LIPITOR) 40 MG tablet Take 40 mg by mouth daily   Yes Historical Provider, MD   ciprofloxacin (CIPRO) 500 MG tablet Take 500 mg by mouth 2 times daily   Yes Historical Provider, MD   naproxen (NAPROSYN) 500 MG tablet Take 1 tablet by mouth 2 times daily (with meals). [id number] Daisy Platt to resume on   Patient not taking: Reported on 13   Nata Mujica MD   oxyCODONE-acetaminophen (PERCOCET) 5-325 MG per tablet 1-2 every 4-6 hours only as needed for pain (watch for sedation, constipation and nausea- ok to add  Laxative as needed)  Patient not taking: Reported on 13   Nata Mujica MD   metaxalone (SKELAXIN) 800 MG tablet Take 800 mg by mouth 3 times daily. Patient not taking: Reported on 2022    Historical Provider, MD   varenicline (CHANTIX) 1 MG tablet Take 1 mg by mouth 2 times daily.   Patient not taking: Reported on 2022    Historical Provider, MD       Current medications:    Current Facility-Administered Medications   Medication Dose Route Frequency Provider Last Rate Last Admin    [facility] Hold] sodium chloride flush 0.9 % injection 5-40 mL  5-40 mL IntraVENous 2 times per day David Munoz MD   10 mL at 22 0816    [MAR Hold] sodium chloride flush 0.9 % injection 5-40 mL  5-40 mL IntraVENous PRN David Munoz MD        [facility] Hold] 0.9 % sodium chloride infusion   IntraVENous PRN David Munoz MD   Stopped at 22 0450    [Held by provider] enoxaparin Sodium (LOVENOX) injection 30 mg  30 mg SubCUTAneous BID Hernandez Salt, MD   30 mg at 08/24/22 2117    [MAR Hold] acetaminophen (TYLENOL) tablet 650 mg  650 mg Oral Q4H PRN Joe Barnes MD        [facility] Hold] 0.9 % sodium chloride infusion   IntraVENous Continuous Jina Thomas  mL/hr at 08/26/22 0522 Rate Verify at 08/26/22 0632    [MAR Hold] ondansetron (ZOFRAN) injection 4 mg  4 mg IntraVENous Q6H PRN Jina Thomas MD        [facility] Hold] pantoprazole (PROTONIX) 40 mg in sodium chloride (PF) 10 mL injection  40 mg IntraVENous Daily Jina Thomas MD   40 mg at 08/26/22 0815    [MAR Hold] fentaNYL (SUBLIMAZE) injection 50 mcg  50 mcg IntraVENous Q2H PRN Jina Thomas MD        Or   Wilmer Lemus [facility] Hold] fentaNYL (SUBLIMAZE) injection 100 mcg  100 mcg IntraVENous Q2H PRN Jina Thomas MD   100 mcg at 08/26/22 1052    [[facility] Hold] piperacillin-tazobactam (ZOSYN) 3,375 mg in sodium chloride 0.9 % 50 mL IVPB (mini-bag)  3,375 mg IntraVENous Joseph Tanner MD 12.5 mL/hr at 08/26/22 1310 3,375 mg at 08/26/22 1310       Allergies:  No Known Allergies    Problem List:    Patient Active Problem List   Diagnosis Code    Herniated cervical disc without myelopathy M50.20    Proctitis K62.89       Past Medical History:        Diagnosis Date    GERD (gastroesophageal reflux disease)     not treated       Past Surgical History:        Procedure Laterality Date    ABDOMEN SURGERY      laporoscopy  for acid reflux    BACK SURGERY      x3    OTHER SURGICAL HISTORY N/A 12 19 13    post cervical laminectomy C5-6 C 6-7    VASECTOMY         Social History:    Social History     Tobacco Use    Smoking status: Former     Packs/day: 1.50     Years: 20.00     Pack years: 30.00     Types: Cigarettes    Smokeless tobacco: Not on file    Tobacco comments:     down to three cigarettes per day   Substance Use Topics    Alcohol use:  Yes     Alcohol/week: 5.0 standard drinks     Types: 5 Cans of beer per week     Comment: daily                                Counseling given: Not Answered  Tobacco comments: down to three cigarettes per day      Vital Signs (Current):   Vitals:    08/26/22 0007 08/26/22 0624 08/26/22 1211 08/26/22 1417   BP: 117/64 131/89 134/77 135/79   Pulse: 68 72 59 61   Resp: 16 16 18 16   Temp: 98.8 °F (37.1 °C) 98.2 °F (36.8 °C) 98.2 °F (36.8 °C) 97.1 °F (36.2 °C)   TempSrc:   Oral Infrared   SpO2: 94% 97% 95% 96%   Weight:       Height:                                                  BP Readings from Last 3 Encounters:   08/26/22 135/79       NPO Status: Time of last liquid consumption: 2300                        Time of last solid consumption: 2359                        Date of last liquid consumption: 08/25/22                        Date of last solid food consumption: 08/21/22    BMI:   Wt Readings from Last 3 Encounters:   08/23/22 246 lb (111.6 kg)     Body mass index is 32.46 kg/m². CBC:   Lab Results   Component Value Date/Time    WBC 12.1 08/26/2022 06:03 AM    RBC 4.03 08/26/2022 06:03 AM    HGB 12.5 08/26/2022 06:03 AM    HCT 37.9 08/26/2022 06:03 AM    MCV 94.0 08/26/2022 06:03 AM    RDW 13.0 08/26/2022 06:03 AM     08/26/2022 06:03 AM     HB 12.5    CMP:   Lab Results   Component Value Date/Time     08/26/2022 06:03 AM    K 4.1 08/26/2022 06:03 AM     08/26/2022 06:03 AM    CO2 26 08/26/2022 06:03 AM    BUN 6 08/26/2022 06:03 AM    CREATININE 0.67 08/26/2022 06:03 AM    GFRAA >60 08/26/2022 06:03 AM    LABGLOM >60 08/26/2022 06:03 AM    GLUCOSE 127 08/26/2022 06:03 AM    PROT 7.4 08/24/2022 01:05 AM    CALCIUM 8.6 08/26/2022 06:03 AM    BILITOT 0.69 08/24/2022 01:05 AM    ALKPHOS 110 08/24/2022 01:05 AM    AST 20 08/24/2022 01:05 AM    ALT 23 08/24/2022 01:05 AM       POC Tests: No results for input(s): POCGLU, POCNA, POCK, POCCL, POCBUN, POCHEMO, POCHCT in the last 72 hours.     Coags:   Lab Results   Component Value Date/Time    PROTIME 10.4 12/13/2013 10:30 AM    INR 1.0 12/13/2013 10:30 AM    APTT 25.5 12/13/2013 10:30 AM HCG (If Applicable): No results found for: PREGTESTUR, PREGSERUM, HCG, HCGQUANT     ABGs: No results found for: PHART, PO2ART, ZFD6YAO, TCF3YHR, BEART, [id number]     Type & Screen (If Applicable):  No results found for: LABABO, LABRH    Drug/Infectious Status (If Applicable):  No results found for: HIV, HEPCAB    COVID-19 Screening (If Applicable): No results found for: COVID19        Anesthesia Evaluation  Patient summary reviewed and Nursing notes reviewed no history of anesthetic complications:   Airway: Mallampati: II  TM distance: >3 FB   Neck ROM: full  Mouth opening: > = 3 FB   Dental: normal exam         Pulmonary:Negative Pulmonary ROS and normal exam  breath sounds clear to auscultation      (-) pneumonia, COPD, asthma, shortness of breath, recent URI, sleep apnea, rhonchi, wheezes, rales, stridor, not a current smoker and no decreased breath sounds                           Cardiovascular:Negative CV ROS  Exercise tolerance: good (>4 METS),       (-) pacemaker, hypertension, valvular problems/murmurs, past MI, CAD, CABG/stent, dysrhythmias,  angina,  CHF, orthopnea, PND,  ALCOCER, murmur, weak pulses,  friction rub, systolic click, carotid bruit,  JVD, peripheral edema, no pulmonary hypertension and no hyperlipidemia    ECG reviewed  Rhythm: regular  Rate: normal           Beta Blocker:  Not on Beta Blocker         Neuro/Psych:   Negative Neuro/Psych ROS     (-) seizures, neuromuscular disease, TIA, CVA, headaches, psychiatric history and depression/anxiety            GI/Hepatic/Renal:   (+) GERD: no interval change,      (-) hiatal hernia, PUD, hepatitis, liver disease, no renal disease, bowel prep and no morbid obesity       Endo/Other: Negative Endo/Other ROS   (+) no malignancy/cancer. (-) diabetes mellitus, hypothyroidism, hyperthyroidism, blood dyscrasia, arthritis, no electrolyte abnormalities, no malignancy/cancer               Abdominal:             Vascular: negative vascular ROS.     - PVD, DVT and PE. Other Findings:           Anesthesia Plan      general     ASA 2       Induction: intravenous. MIPS: Postoperative opioids intended and Prophylactic antiemetics administered. Anesthetic plan and risks discussed with patient. Plan discussed with CRNA.                     Cathy Moon MD   8/26/2022

## 2022-08-26 NOTE — PROGRESS NOTES
[address] [place] [address]    Progress Note    8/26/2022    4:27 PM    Name:   Manuel Melgar  MRN:     [id number]     Acct:      [de-identified]   Room:   [facility] OR Pool/NONE  IP Day:  2  Admit Date:  8/23/2022 11:55 PM    PCP:   Pauline Camacho DO  Code Status:  Full Code    Subjective:     C/C:   Chief Complaint   Patient presents with    Abdominal Pain    Constipation     Interval History Status: not changed. Patient states that he continues to have pain in the lower abdomen and also his rectal area is very irritated and painful  Going to have colonoscopy today   developed urinary retention, Vinson was placed  Afebrile  BP stable  Respiratory status stable on room air   Mild leukocytosis with WBC of 12.1  Hemoglobin stable  Platelets stable  Renal function stable      Review of Systems:     Constitutional:  negative for chills, fevers, sweats  Respiratory:  negative for cough, dyspnea on exertion, shortness of breath, wheezing  Cardiovascular:  negative for chest pain, chest pressure/discomfort, lower extremity edema, palpitations  Gastrointestinal:  negative for  hematemesis,vomiting  Neurological:  negative for dizziness, headache    Medications:      Allergies:  No Known Allergies    Current Meds:   Scheduled Meds:    [MAR Hold] sodium chloride flush  5-40 mL IntraVENous 2 times per day    [Held by provider] enoxaparin  30 mg SubCUTAneous BID    [MAR Hold] pantoprazole (PROTONIX) 40 mg injection  40 mg IntraVENous Daily    [MAR Hold] piperacillin-tazobactam  3,375 mg IntraVENous Q8H     Continuous Infusions:    [MAR Hold] sodium chloride Stopped (08/26/22 0450)    [MAR Hold] sodium chloride 150 mL/hr at 08/26/22 0632     PRN Meds: simethicone, [MAR Hold] sodium chloride flush, [MAR Hold] sodium chloride, [MAR Hold] acetaminophen, [MAR Hold] ondansetron, [MAR Hold] fentanNYL **OR** [MAR Hold] fentanNYL    Data:     Past Medical History:   has a past medical history of GERD (gastroesophageal reflux disease). Social History:   reports that he has quit smoking. His smoking use included cigarettes. He has a 30.00 pack-year smoking history. He does not have any smokeless tobacco history on file. He reports current alcohol use of about 5.0 standard drinks per week. He reports that he does not currently use drugs. Family History:   Family History   Problem Relation Age of Onset    Diabetes Mother     Cancer Father        Vitals:  /79   Pulse 61   Temp 97.1 °F (36.2 °C) (Infrared)   Resp 16   Ht 6' 1\" (1.854 m)   Wt 246 lb (111.6 kg)   SpO2 96%   BMI 32.46 kg/m²   Temp (24hrs), Av.1 °F (36.7 °C), Min:97.1 °F (36.2 °C), Max:98.8 °F (37.1 °C)    No results for input(s): POCGLU in the last 72 hours. I/O (24Hr):     Intake/Output Summary (Last 24 hours) at 2022 1627  Last data filed at 2022 1213  Gross per 24 hour   Intake 4581.08 ml   Output 2700 ml   Net 1881.08 ml       Labs:  Hematology:  Recent Labs     22  0105 22  0616 22  0603   WBC 11.3* 10.6 12.1*   RBC 4.40* 4.21* 4.03*   HGB 13.8 13.2* 12.5*   HCT 41.2 39.6* 37.9*   MCV 93.7 93.9 94.0   MCH 31.4 31.4 30.9   MCHC 33.5 33.5 32.9   RDW 12.9 12.9 13.0    220 236   MPV 7.5 7.8 7.4     Chemistry:  Recent Labs     22  0105 22  0616 22  0603   * 136 138   K 3.9 3.8 4.1    101 104   CO2 22 27 26   GLUCOSE 150* 134* 127*   BUN 8 6 6   CREATININE 0.68* 0.73 0.67*   MG 2.0  --   --    ANIONGAP 12 8* 8*   LABGLOM >60 >60 >60   GFRAA >60 >60 >60   CALCIUM 9.3 8.9 8.6     Recent Labs     22  0105   PROT 7.4   LABALBU 4.2   AST 20   ALT 23   ALKPHOS 110   BILITOT 0.69   LIPASE 15     ABG:No results found for: POCPH, PHART, PH, POCPCO2, WJQ9DTA, PCO2, POCPO2, PO2ART, PO2, POCHCO3, FBL2WVO, HCO3, NBEA, PBEA, BEART, BE, THGBART, THB, HEK2UMZ, TMVT0SFL, W8NOBQOO, O2SAT, FIO2  No results found for: SPECIAL  No results found for:

## 2022-08-26 NOTE — CARE COORDINATION
ONGOING DISCHARGE PLAN:    Patient is alert and oriented x4. Spoke with patient regarding discharge plan and patient confirms that plan is still home without needs. Declined VNS. Pt having colonoscopy today. Active order for IV Zosyn. Will continue to follow for additional discharge needs.     Electronically signed by Robbin Xiao RN on 8/26/2022 at 3:12 PM

## 2022-08-26 NOTE — OP NOTE
Operative Note      Patient: Rd Cohen  YOB: 1970  MRN: [id number]    Date of Procedure: 8/26/2022    PROCEDURE NOTE    DATE OF PROCEDURE: 8/26/2022    SURGEON: Isidra Lucas MD    ASSISTANT: None    PREOPERATIVE DIAGNOSIS: Perirectal pain    POSTOPERATIVE DIAGNOSIS: Perirectal pain/abscess 6 o'clock position. Fair preparation. OPERATION: Total colonoscopy to cecum with intubation of terminal ileum. Examination under anesthesia. Incision and drainage perirectal abscess 6 o'clock position. ANESTHESIA: General    ESTIMATED BLOOD LOSS: None    COMPLICATIONS: None     SPECIMENS:  Was Not Obtained    HISTORY: The patient is a 46y.o. year old male with history of above preop diagnosis. I recommended colonoscopy with possible biopsy or polypectomy and I explained the risk, benefits, expected outcome, and alternatives to the procedure. Risks included but are not limited to bleeding, infection, respiratory distress, hypotension, and perforation of the colon and possibility of missing a lesion. The patient understands and is in agreement. PROCEDURE: The patient was given IV conscious sedation. The patient's SPO2 remained above 90% throughout the procedure. Digital rectal exam was normal.  The colonoscope was inserted through the anus into the rectum and advanced under direct vision to the cecum without difficulty. Terminal ileum was examined for approximately 2 inches. The prep was fair. Findings:  Terminal ileum: normal    Cecum/Ascending colon: normal    Transverse colon: normal    Descending/Sigmoid colon: normal    Rectum/Anus: examined in normal and retroflexed positions and was no evidence of proctitis. Retroflexion revealed a small hyperplastic polyp otherwise unremarkable. Examination under general anesthesia was performed. I did review CT scan with the radiologist today. Patient was found to have perirectal abscess at 6 o'clock position.   It was somewhat deep-seated when the patient came in. Upon further exploration under anesthesia patient was noted to have perirectal abscess at 6 o'clock position which was drained without any difficulty. Copious amount of thick pus was drained. Wound was irrigated. Hemostasis was confirmed. Sponge needle instrument count was found to be correct. Wound was packed open using half-inch iodoform gauze. Clean dressing was applied. Patient tolerated procedure well. Withdrawal Time was (minutes): 18        The colon was decompressed. While withdrawing the scope the above findings were verified and the scope was removed. The patient tolerated the procedure and conscious sedation without unusual events. In the recovery room patient was examined and remains hemodynamically stable. Discharge home when criteria met. Recommendations/Plan:   Local wound care. Pain control. Continue IV antibiotics. Resume diet. Discussed with the family  High fiber diet   Precautions to avoid constipation  Repeat colonoscopy in the future given fair preparation. We will discussed with him during his office visit.     Electronically signed by Mac Mccallum MD  on 8/26/2022 at 4:50 PM

## 2022-08-26 NOTE — PLAN OF CARE
Problem: Discharge Planning  Goal: Discharge to home or other facility with appropriate resources  8/26/2022 1006 by Sabas Watters RN  Outcome: Progressing     Problem: Pain  Goal: Verbalizes/displays adequate comfort level or baseline comfort level  8/26/2022 1006 by Sabas Watters RN  Outcome: Progressing

## 2022-08-26 NOTE — ANESTHESIA POSTPROCEDURE EVALUATION
POST- ANESTHESIA EVALUATION       Pt Name: Jennifer Enriquez  MRN: [id number]  YOB: 1970  Date of evaluation: 8/26/2022  Time:  5:58 PM      /67   Pulse 72   Temp 98.2 °F (36.8 °C) (Infrared)   Resp 16   Ht 6' 1\" (1.854 m)   Wt 246 lb (111.6 kg)   SpO2 99%   BMI 32.46 kg/m²      Consciousness Level  Awake  Cardiopulmonary Status  Stable  Pain Adequately Treated YES  Nausea / Vomiting  NO  Adequate Hydration  YES  Anesthesia Related Complications NONE      Electronically signed by Lina Hinkle MD on 8/26/2022 at 5:58 PM       Department of Anesthesiology  Postprocedure Note    Patient: Jennifer Enriquez  MRN: [id number]  YOB: 1970  Date of evaluation: 8/26/2022      Procedure Summary     Date: 08/26/22 Room / Location: [address] [place] [address] [facility] Chun / [facility]    Anesthesia Start: [facility] Anesthesia Stop: 1998    Procedures:       COLONOSCOPY DIAGNOSTIC WITH EUA      RECTAL PERIRECTAL INCISION AND DRAINAGE (Perineum) Diagnosis:       Rectal pain      Proctitis      (RECTAL PAIN / Tripathi Gory)      (IP RM [id number])    Surgeons: Aura Matta MD Responsible Provider: Lina Hinkle MD    Anesthesia Type: General ASA Status: 2          Anesthesia Type: General    Isidro Phase I: Isidro Score: 8    Isidro Phase II:        Anesthesia Post Evaluation

## 2022-08-26 NOTE — PLAN OF CARE
Problem: Discharge Planning  Goal: Discharge to home or other facility with appropriate resources  8/26/2022 0046 by Zi An RN  Outcome: Progressing  Flowsheets (Taken 8/25/2022 0900 by Pamela Villalta RN)  Discharge to home or other facility with appropriate resources: Identify barriers to discharge with patient and caregiver  8/25/2022 1823 by Pamela Villalta RN  Outcome: Not Progressing  Flowsheets (Taken 8/25/2022 0900)  Discharge to home or other facility with appropriate resources: Identify barriers to discharge with patient and caregiver     Problem: Pain  Goal: Verbalizes/displays adequate comfort level or baseline comfort level  8/26/2022 0046 by Zi An RN  Outcome: Progressing  Flowsheets (Taken 8/26/2022 0046)  Verbalizes/displays adequate comfort level or baseline comfort level:   Encourage patient to monitor pain and request assistance   Assess pain using appropriate pain scale   Administer analgesics based on type and severity of pain and evaluate response   Implement non-pharmacological measures as appropriate and evaluate response   Notify Licensed Independent Practitioner if interventions unsuccessful or patient reports new pain  8/25/2022 1823 by Pamela Villalta RN  Outcome: Not Progressing  Flowsheets (Taken 8/25/2022 1823)  Verbalizes/displays adequate comfort level or baseline comfort level:   Encourage patient to monitor pain and request assistance   Assess pain using appropriate pain scale   Administer analgesics based on type and severity of pain and evaluate response     Problem: Discharge Planning  Goal: Discharge to home or other facility with appropriate resources  8/26/2022 0046 by Zi An RN  Outcome: Progressing  Flowsheets (Taken 8/25/2022 0900 by Pamela Villalta RN)  Discharge to home or other facility with appropriate resources: Identify barriers to discharge with patient and caregiver  8/25/2022 1823 by Pamela Villalta RN  Outcome: Not Progressing  Flowsheets (Taken 8/25/2022 0900)  Discharge to home or other facility with appropriate resources: Identify barriers to discharge with patient and caregiver     Problem: Pain  Goal: Verbalizes/displays adequate comfort level or baseline comfort level  8/26/2022 0046 by Andi Bonilla RN  Outcome: Progressing  Flowsheets (Taken 8/26/2022 0046)  Verbalizes/displays adequate comfort level or baseline comfort level:   Encourage patient to monitor pain and request assistance   Assess pain using appropriate pain scale   Administer analgesics based on type and severity of pain and evaluate response   Implement non-pharmacological measures as appropriate and evaluate response   Notify Licensed Independent Practitioner if interventions unsuccessful or patient reports new pain  8/25/2022 1823 by Leeann Gupta RN  Outcome: Not Progressing  Flowsheets (Taken 8/25/2022 1823)  Verbalizes/displays adequate comfort level or baseline comfort level:   Encourage patient to monitor pain and request assistance   Assess pain using appropriate pain scale   Administer analgesics based on type and severity of pain and evaluate response

## 2022-08-27 LAB
ABSOLUTE EOS #: 0.1 K/UL (ref 0–0.4)
ABSOLUTE LYMPH #: 1.7 K/UL (ref 1–4.8)
ABSOLUTE MONO #: 1.2 K/UL (ref 0.1–1.3)
ANION GAP SERPL CALCULATED.3IONS-SCNC: 10 MMOL/L (ref 9–17)
BASOPHILS # BLD: 0 % (ref 0–2)
BASOPHILS ABSOLUTE: 0 K/UL (ref 0–0.2)
BUN BLDV-MCNC: 6 MG/DL (ref 6–20)
CALCIUM SERPL-MCNC: 8.3 MG/DL (ref 8.6–10.4)
CHLORIDE BLD-SCNC: 103 MMOL/L (ref 98–107)
CO2: 24 MMOL/L (ref 20–31)
CREAT SERPL-MCNC: 0.63 MG/DL (ref 0.7–1.2)
EOSINOPHILS RELATIVE PERCENT: 1 % (ref 0–4)
GFR AFRICAN AMERICAN: >60 ML/MIN
GFR NON-AFRICAN AMERICAN: >60 ML/MIN
GFR SERPL CREATININE-BSD FRML MDRD: ABNORMAL ML/MIN/{1.73_M2}
GLUCOSE BLD-MCNC: 121 MG/DL (ref 70–99)
HCT VFR BLD CALC: 37.6 % (ref 41–53)
HEMOGLOBIN: 12.5 G/DL (ref 13.5–17.5)
LYMPHOCYTES # BLD: 16 % (ref 24–44)
MCH RBC QN AUTO: 31.4 PG (ref 26–34)
MCHC RBC AUTO-ENTMCNC: 33.3 G/DL (ref 31–37)
MCV RBC AUTO: 94.3 FL (ref 80–100)
MONOCYTES # BLD: 11 % (ref 1–7)
PDW BLD-RTO: 12.8 % (ref 11.5–14.9)
PLATELET # BLD: 250 K/UL (ref 150–450)
PMV BLD AUTO: 7.5 FL (ref 6–12)
POTASSIUM SERPL-SCNC: 3.5 MMOL/L (ref 3.7–5.3)
RBC # BLD: 3.99 M/UL (ref 4.5–5.9)
SEG NEUTROPHILS: 72 % (ref 36–66)
SEGMENTED NEUTROPHILS ABSOLUTE COUNT: 8.1 K/UL (ref 1.3–9.1)
SODIUM BLD-SCNC: 137 MMOL/L (ref 135–144)
WBC # BLD: 11.2 K/UL (ref 3.5–11)

## 2022-08-27 PROCEDURE — C9113 INJ PANTOPRAZOLE SODIUM, VIA: HCPCS | Performed by: SURGERY

## 2022-08-27 PROCEDURE — 6370000000 HC RX 637 (ALT 250 FOR IP): Performed by: UROLOGY

## 2022-08-27 PROCEDURE — 80048 BASIC METABOLIC PNL TOTAL CA: CPT

## 2022-08-27 PROCEDURE — 1200000000 HC SEMI PRIVATE

## 2022-08-27 PROCEDURE — 2580000003 HC RX 258: Performed by: SURGERY

## 2022-08-27 PROCEDURE — A4216 STERILE WATER/SALINE, 10 ML: HCPCS | Performed by: SURGERY

## 2022-08-27 PROCEDURE — 85025 COMPLETE CBC W/AUTO DIFF WBC: CPT

## 2022-08-27 PROCEDURE — 51702 INSERT TEMP BLADDER CATH: CPT

## 2022-08-27 PROCEDURE — 6360000002 HC RX W HCPCS: Performed by: SURGERY

## 2022-08-27 PROCEDURE — 6370000000 HC RX 637 (ALT 250 FOR IP): Performed by: FAMILY MEDICINE

## 2022-08-27 PROCEDURE — 6370000000 HC RX 637 (ALT 250 FOR IP): Performed by: SURGERY

## 2022-08-27 PROCEDURE — 36415 COLL VENOUS BLD VENIPUNCTURE: CPT

## 2022-08-27 RX ORDER — POTASSIUM CHLORIDE 20 MEQ/1
40 TABLET, EXTENDED RELEASE ORAL PRN
Status: DISCONTINUED | OUTPATIENT
Start: 2022-08-27 | End: 2022-08-28 | Stop reason: HOSPADM

## 2022-08-27 RX ORDER — TAMSULOSIN HYDROCHLORIDE 0.4 MG/1
0.4 CAPSULE ORAL DAILY
Status: DISCONTINUED | OUTPATIENT
Start: 2022-08-27 | End: 2022-08-28 | Stop reason: HOSPADM

## 2022-08-27 RX ORDER — POTASSIUM CHLORIDE 7.45 MG/ML
10 INJECTION INTRAVENOUS PRN
Status: DISCONTINUED | OUTPATIENT
Start: 2022-08-27 | End: 2022-08-28 | Stop reason: HOSPADM

## 2022-08-27 RX ADMIN — PIPERACILLIN AND TAZOBACTAM 3375 MG: 3; .375 INJECTION, POWDER, FOR SOLUTION INTRAVENOUS at 21:32

## 2022-08-27 RX ADMIN — FENTANYL CITRATE 100 MCG: 50 INJECTION, SOLUTION INTRAMUSCULAR; INTRAVENOUS at 05:32

## 2022-08-27 RX ADMIN — SODIUM CHLORIDE, PRESERVATIVE FREE 10 ML: 5 INJECTION INTRAVENOUS at 21:27

## 2022-08-27 RX ADMIN — ENOXAPARIN SODIUM 30 MG: 100 INJECTION SUBCUTANEOUS at 21:26

## 2022-08-27 RX ADMIN — OXYCODONE AND ACETAMINOPHEN 1 TABLET: 5; 325 TABLET ORAL at 21:38

## 2022-08-27 RX ADMIN — SODIUM CHLORIDE 40 MG: 9 INJECTION, SOLUTION INTRAMUSCULAR; INTRAVENOUS; SUBCUTANEOUS at 08:05

## 2022-08-27 RX ADMIN — FENTANYL CITRATE 100 MCG: 50 INJECTION, SOLUTION INTRAMUSCULAR; INTRAVENOUS at 14:51

## 2022-08-27 RX ADMIN — FENTANYL CITRATE 100 MCG: 50 INJECTION, SOLUTION INTRAMUSCULAR; INTRAVENOUS at 18:13

## 2022-08-27 RX ADMIN — ENOXAPARIN SODIUM 30 MG: 100 INJECTION SUBCUTANEOUS at 08:05

## 2022-08-27 RX ADMIN — SODIUM CHLORIDE, PRESERVATIVE FREE 10 ML: 5 INJECTION INTRAVENOUS at 08:06

## 2022-08-27 RX ADMIN — OXYCODONE AND ACETAMINOPHEN 1 TABLET: 5; 325 TABLET ORAL at 17:20

## 2022-08-27 RX ADMIN — TAMSULOSIN HYDROCHLORIDE 0.4 MG: 0.4 CAPSULE ORAL at 11:10

## 2022-08-27 RX ADMIN — OXYCODONE AND ACETAMINOPHEN 1 TABLET: 5; 325 TABLET ORAL at 08:05

## 2022-08-27 RX ADMIN — PIPERACILLIN AND TAZOBACTAM 3375 MG: 3; .375 INJECTION, POWDER, FOR SOLUTION INTRAVENOUS at 05:01

## 2022-08-27 RX ADMIN — DOCUSATE SODIUM 100 MG: 100 CAPSULE, LIQUID FILLED ORAL at 08:05

## 2022-08-27 RX ADMIN — PIPERACILLIN AND TAZOBACTAM 3375 MG: 3; .375 INJECTION, POWDER, FOR SOLUTION INTRAVENOUS at 13:02

## 2022-08-27 RX ADMIN — POTASSIUM CHLORIDE 40 MEQ: 1500 TABLET, EXTENDED RELEASE ORAL at 17:16

## 2022-08-27 ASSESSMENT — PAIN DESCRIPTION - LOCATION
LOCATION: PERINEUM
LOCATION: BUTTOCKS
LOCATION: BUTTOCKS
LOCATION: RECTUM
LOCATION: BUTTOCKS
LOCATION: BUTTOCKS

## 2022-08-27 ASSESSMENT — PAIN DESCRIPTION - ORIENTATION
ORIENTATION: MID
ORIENTATION: MID

## 2022-08-27 ASSESSMENT — PAIN SCALES - GENERAL
PAINLEVEL_OUTOF10: 6
PAINLEVEL_OUTOF10: 10
PAINLEVEL_OUTOF10: 6
PAINLEVEL_OUTOF10: 10
PAINLEVEL_OUTOF10: 8
PAINLEVEL_OUTOF10: 6

## 2022-08-27 ASSESSMENT — PAIN DESCRIPTION - DESCRIPTORS
DESCRIPTORS: ACHING
DESCRIPTORS: ACHING
DESCRIPTORS: ACHING;DISCOMFORT

## 2022-08-27 NOTE — CONSULTS
Amber Garay, Miky Li, Jonathon Steel, & Stan  Urology Consultation      Patient:  Boom Gupta  MRN: [id number]  YOB: 1970    CHIEF COMPLAINT: Urinary retention. Perirectal abscess. HISTORY OF PRESENT ILLNESS:   The patient is a 46 y.o. male who presents with perirectal abscess. General surgery drained this area. Patient had some trouble urinating for the last week. Also was having severe constipation. Vinson catheter was placed. Flomax was started. Timing 1 week. Better with Vinson catheter. No current pain or discomfort. Associated with perirectal abscess and surrounding inflammation. Improved with antibiotic therapy. Current pain 5 out of 10 from perirectal abscess. CT scan independently reviewed and verified. 6:00 perirectal abscess. No prostate pathology noted. Patient's old records, notes and chart reviewed and summarized above.     Past Medical History:    Past Medical History:   Diagnosis Date    GERD (gastroesophageal reflux disease)     not treated       Past Surgical History:    Past Surgical History:   Procedure Laterality Date    ABDOMEN SURGERY      laporoscopy  for acid reflux    BACK SURGERY      x3    OTHER SURGICAL HISTORY N/A 12 19 13    post cervical laminectomy C5-6 C 6-7    VASECTOMY         Medications:      Current Facility-Administered Medications:     tamsulosin (FLOMAX) capsule 0.4 mg, 0.4 mg, Oral, Daily, Cristian Benitez MD    enoxaparin Sodium (LOVENOX) injection 30 mg, 30 mg, SubCUTAneous, BID, Kym Medellin MD, 30 mg at 08/27/22 0805    oxyCODONE-acetaminophen (PERCOCET) 5-325 MG per tablet 1 tablet, 1 tablet, Oral, Q4H PRN, Kym Medellin MD, 1 tablet at 08/27/22 0805    docusate sodium (COLACE) capsule 100 mg, 100 mg, Oral, Daily, Kym Medellin MD, 100 mg at 08/27/22 0805    sodium chloride flush 0.9 % injection 5-40 mL, 5-40 mL, IntraVENous, 2 times per day, Kym Medellin MD, 10 mL at 08/27/22 0806    sodium chloride flush 0.9 % injection 5-40 mL, 5-40 mL, IntraVENous, PRN, Eduardo Cardoso MD    0.9 % sodium chloride infusion, , IntraVENous, PRN, Eduardo Cardoso MD, Stopped at 08/26/22 0450    acetaminophen (TYLENOL) tablet 650 mg, 650 mg, Oral, Q4H PRN, Eduardo Cardoso MD    0.9 % sodium chloride infusion, , IntraVENous, Continuous, Eduardo Cardoso MD, Last Rate: 150 mL/hr at 08/26/22 1743, New Bag at 08/26/22 1743    ondansetron (ZOFRAN) injection 4 mg, 4 mg, IntraVENous, Q6H PRN, Eduardo Cardoso MD    pantoprazole (PROTONIX) 40 mg in sodium chloride (PF) 10 mL injection, 40 mg, IntraVENous, Daily, Eduardo Cardoso MD, 40 mg at 08/27/22 0805    fentaNYL (SUBLIMAZE) injection 50 mcg, 50 mcg, IntraVENous, Q2H PRN **OR** fentaNYL (SUBLIMAZE) injection 100 mcg, 100 mcg, IntraVENous, Q2H PRN, Eduardo Cardoso MD, 100 mcg at 08/27/22 0532    piperacillin-tazobactam (ZOSYN) 3,375 mg in sodium chloride 0.9 % 50 mL IVPB (mini-bag), 3,375 mg, IntraVENous, Q8H, Eduardo Cardoso MD, Last Rate: 12.5 mL/hr at 08/27/22 0501, 3,375 mg at 08/27/22 0501    Allergies:  No Known Allergies    Social History:   Social History     Socioeconomic History    Marital status:      Spouse name: Not on file    Number of children: Not on file    Years of education: Not on file    Highest education level: Not on file   Occupational History    Not on file   Tobacco Use    Smoking status: Former     Packs/day: 1.50     Years: 20.00     Pack years: 30.00     Types: Cigarettes    Smokeless tobacco: Not on file    Tobacco comments:     down to three cigarettes per day   Substance and Sexual Activity    Alcohol use:  Yes     Alcohol/week: 5.0 standard drinks     Types: 5 Cans of beer per week     Comment: daily    Drug use: Not Currently    Sexual activity: Not on file   Other Topics Concern    Not on file   Social History Narrative    Not on file     Social Determinants of Health     Financial Resource Strain: Not on file   Food Insecurity: Not on file   Transportation Needs: Not on file   Physical Activity: Not on file   Stress: Not on file   Social Connections: Not on file   Intimate Partner Violence: Not on file   Housing Stability: Not on file       Family History:    Family History   Problem Relation Age of Onset    Diabetes Mother     Cancer Father        REVIEW OF SYSTEMS:  A comprehensive 14 point review of systems was obtained. Constitutional: No fatigue  Eyes: No blurry vision  Ears, nose, mouth, throat, face: No ringing in the ears; no facial droop. Respiratory: No cough or cold. Cardiovascular: No palpitations  Gastrointestinal: No diarrhea or constipation. Genitourinary: No burning with urination  Integument/Skin: No rashes  Hematologic/Lymphatic: No easy bruising  Musculoskeletal: No muscle pains  Neurologic: No weakness in the extremities. Psychiatric: No depression or suicidal thoughts. Endocrine: No heat or cold intolerances. Allergic/Immunologic: No current seasonal allergies; no skin hives. Physical Exam:    This a 46 y.o. male   Vitals:    08/27/22 0652   BP: (!) 102/56   Pulse: 58   Resp: 16   Temp: 98.8 °F (37.1 °C)   SpO2: 94%     Constitutional: Patient in no acute distress. Neuro: alert and oriented to person place and time. Head: Atraumatic and normocephalic. Neck: Trachea midline   Ext: 2+ radial pulses bilaterally. Psych: Mood and affect normal.  Skin: No rashes or bruising present. Lungs: Respiratory effort normal.  Cardiovascular:  Regular rhythm. Abdomen: Soft, non-tender, non-distended. Neither side has CVA tenderness on exam.  Bladder non-tender and not distended. grams.      Labs:  Recent Labs     08/25/22  0616 08/26/22  0603 08/27/22  0539   WBC 10.6 12.1* 11.2*   HGB 13.2* 12.5* 12.5*   HCT 39.6* 37.9* 37.6*   MCV 93.9 94.0 94.3    236 250     Recent Labs     08/25/22  0616 08/26/22  0603 08/27/22  0539    138 137   K 3.8 4.1 3.5*    104 103   CO2 27 26 24   BUN 6 6 6 CREATININE 0.73 0.67* 0.63*       Recent Labs     08/25/22  0003   COLORU Yellow   PHUR 5.5   SPECGRAV 1.011   LEUKOCYTESUR NEGATIVE   UROBILINOGEN Normal   BILIRUBINUR NEGATIVE       [facility]:  [facility]      -----------------------------------------------------------------  Imaging Results:  XR ABDOMEN (KUB) (SINGLE AP VIEW)    Result Date: 8/23/2022  EXAMINATION: ONE SUPINE XRAY VIEW(S) OF THE ABDOMEN 8/23/2022 10:26 am COMPARISON: None. HISTORY: ORDERING SYSTEM PROVIDED HISTORY: Stomach ache TECHNOLOGIST PROVIDED HISTORY: Reason for Exam: constipation x 3 days, abd pain x 1 week FINDINGS: The gas pattern is unremarkable. No renal stones are seen. There is no free air. The bones are intact. Nonspecific KUB     CT ABDOMEN PELVIS W IV CONTRAST Additional Contrast? None    Result Date: 8/24/2022  EXAMINATION: CT OF THE ABDOMEN AND PELVIS WITH CONTRAST 8/24/2022 2:37 am TECHNIQUE: CT of the abdomen and pelvis was performed with the administration of intravenous contrast. Multiplanar reformatted images are provided for review. Automated exposure control, iterative reconstruction, and/or weight based adjustment of the mA/kV was utilized to reduce the radiation dose to as low as reasonably achievable. COMPARISON: None. HISTORY: ORDERING SYSTEM PROVIDED HISTORY: lower abd pain, fever TECHNOLOGIST PROVIDED HISTORY: lower abd pain, fever Decision Support Exception - unselect if not a suspected or confirmed emergency medical condition->Emergency Medical Condition (MA) Reason for Exam: pt. c/o LLQ/RLQ pain with difficulty passing stool. pt. also has fever Relevant Medical/Surgical History: No previous hx. per pt FINDINGS: Lower Chest:  Visualized portion of the lower chest demonstrates no acute abnormality. Organs: Liver, gallbladder, spleen, adrenals, kidneys, pancreas, bile ducts, stomach are without acute process. Ureters are nondilated. The bladder is within normal limits. Normal prostate.  GI/Bowel: Wall thickening is present in the distal rectum with adjacent fat stranding, such as on series 2, image 272, with extension to the anus. A small focus of fluid may be present at 6 o'clock measuring 2.2 x 1.5 cm on series 2, image 194. There is liquid stool in the remainder of the colon which is mildly distended proximally. The appendix is not well visualized. No evidence of small-bowel obstruction. Pelvis: See findings above. Peritoneum/Retroperitoneum: Mild atherosclerosis. Patent vasculature. Nonspecific presacral edema. No free fluid or free air. No lymphadenopathy. Bones/Soft Tissues: Tiny periumbilical hernia. No acute osseous abnormality. 1. Findings compatible with severe proctitis of the distal rectum. A small perirectal abscess may present just superior to the anal verge at the 6 o'clock position, although visualization is limited and this also could represent intraluminal fluid. Further evaluation with direct visualization is recommended to exclude an underlying neoplasm. 2. Mild distention of the proximal colon with gas and liquid stool. No small bowel obstruction. CT scan independently reviewed and verified. Assessment and Plan   Impression:    Patient Active Problem List   Diagnosis    Herniated cervical disc without myelopathy    Proctitis   Urinary retention        Plan:   Continue Vinson catheter. Start Flomax. We will plan for voiding trial prior to discharge. Constipation must also be treated as this can worsen urinary retention. Please call with any questions or concerns. Thank you for involving us in his care. Thank you for involving us in the care of Naila Burgess. Should you have any questions, please do not hesitate to contact us at any time.     Yumiko Walker MD  9:54 AM 8/27/2022

## 2022-08-27 NOTE — PROGRESS NOTES
Hospitalist Progress Note  8/27/2022 3:27 PM  Subjective:   Admit Date: 8/23/2022  PCP: Nacho Sauer DO     Full Code      C/c:  Chief Complaint   Patient presents with    Abdominal Pain    Constipation         Interval History: still having pain ,afebrile,vitals stable    Diet: ADULT DIET; Regular                                ip days:3  Medications:   Scheduled Meds:   tamsulosin  0.4 mg Oral Daily    enoxaparin  30 mg SubCUTAneous BID    docusate sodium  100 mg Oral Daily    sodium chloride flush  5-40 mL IntraVENous 2 times per day    pantoprazole (PROTONIX) 40 mg injection  40 mg IntraVENous Daily    piperacillin-tazobactam  3,375 mg IntraVENous Q8H     Continuous Infusions:   sodium chloride Stopped (08/26/22 0450)    sodium chloride 150 mL/hr at 08/26/22 1743     PRN Meds:.potassium chloride **OR** potassium alternative oral replacement **OR** potassium chloride, oxyCODONE-acetaminophen, sodium chloride flush, sodium chloride, acetaminophen, ondansetron, fentanNYL **OR** fentanNYL     CBC:   Recent Labs     08/25/22  0616 08/26/22  0603 08/27/22  0539   WBC 10.6 12.1* 11.2*   HGB 13.2* 12.5* 12.5*    236 250     BMP:    Recent Labs     08/25/22  0616 08/26/22  0603 08/27/22  0539    138 137   K 3.8 4.1 3.5*    104 103   CO2 27 26 24   BUN 6 6 6   CREATININE 0.73 0.67* 0.63*   GLUCOSE 134* 127* 121*     Hepatic: No results for input(s): AST, ALT, ALB, BILITOT, ALKPHOS in the last 72 hours. Troponin: No results for input(s): TROPONINI in the last 72 hours. BNP: No results for input(s): BNP in the last 72 hours. Lipids: No results for input(s): CHOL, HDL in the last 72 hours. Invalid input(s): LDLCALCU  INR: No results for input(s): INR in the last 72 hours.     Objective:   Vitals: /69   Pulse 54   Temp 97.3 °F (36.3 °C)   Resp 16   Ht 6' 1\" (1.854 m)   Wt 246 lb (111.6 kg)   SpO2 92%   BMI 32.46 kg/m²   General appearance: alert, appears stated age and cooperative  Skin: Skin color, texture, turgor normal. No rashes or lesions  Lungs: clear to auscultation bilaterally  Heart: regular rate and rhythm, S1, S2 normal, no murmur, click, rub or gallop  Abdomen: soft, non-tender; bowel sounds normal; no masses,  no organomegaly  Extremities: extremities normal, atraumatic, no cyanosis or edema  Neurologic: Mental status: Alert, oriented, thought content appropriate    Prophylaxis:   DVT with  [x] lovenox        [] heparin        [] Scd        [] none:     Radiology:  XR ABDOMEN (KUB) (SINGLE AP VIEW)    Result Date: 8/23/2022  EXAMINATION: ONE SUPINE XRAY VIEW(S) OF THE ABDOMEN 8/23/2022 10:26 am COMPARISON: None. HISTORY: ORDERING SYSTEM PROVIDED HISTORY: Stomach ache TECHNOLOGIST PROVIDED HISTORY: Reason for Exam: constipation x 3 days, abd pain x 1 week FINDINGS: The gas pattern is unremarkable. No renal stones are seen. There is no free air. The bones are intact. Nonspecific KUB     CT ABDOMEN PELVIS W IV CONTRAST Additional Contrast? None    Result Date: 8/24/2022  EXAMINATION: CT OF THE ABDOMEN AND PELVIS WITH CONTRAST 8/24/2022 2:37 am TECHNIQUE: CT of the abdomen and pelvis was performed with the administration of intravenous contrast. Multiplanar reformatted images are provided for review. Automated exposure control, iterative reconstruction, and/or weight based adjustment of the mA/kV was utilized to reduce the radiation dose to as low as reasonably achievable. COMPARISON: None. HISTORY: ORDERING SYSTEM PROVIDED HISTORY: lower abd pain, fever TECHNOLOGIST PROVIDED HISTORY: lower abd pain, fever Decision Support Exception - unselect if not a suspected or confirmed emergency medical condition->Emergency Medical Condition (MA) Reason for Exam: pt. c/o LLQ/RLQ pain with difficulty passing stool. pt. also has fever Relevant Medical/Surgical History: No previous hx. per pt FINDINGS: Lower Chest:  Visualized portion of the lower chest demonstrates no acute abnormality.  Organs: Liver, gallbladder, spleen, adrenals, kidneys, pancreas, bile ducts, stomach are without acute process. Ureters are nondilated. The bladder is within normal limits. Normal prostate. GI/Bowel: Wall thickening is present in the distal rectum with adjacent fat stranding, such as on series 2, image 272, with extension to the anus. A small focus of fluid may be present at 6 o'clock measuring 2.2 x 1.5 cm on series 2, image 194. There is liquid stool in the remainder of the colon which is mildly distended proximally. The appendix is not well visualized. No evidence of small-bowel obstruction. Pelvis: See findings above. Peritoneum/Retroperitoneum: Mild atherosclerosis. Patent vasculature. Nonspecific presacral edema. No free fluid or free air. No lymphadenopathy. Bones/Soft Tissues: Tiny periumbilical hernia. No acute osseous abnormality. 1. Findings compatible with severe proctitis of the distal rectum. A small perirectal abscess may present just superior to the anal verge at the 6 o'clock position, although visualization is limited and this also could represent intraluminal fluid. Further evaluation with direct visualization is recommended to exclude an underlying neoplasm. 2. Mild distention of the proximal colon with gas and liquid stool. No small bowel obstruction. Assessment :   Proctitis/lurdes rectal abscess/incision and drainage  Continue antibiotics     Plan:   1.   See order  2.  ambulate    Patient Active Problem List:     Herniated cervical disc without myelopathy     Proctitis      Anticipated Disposition upon discharge: [] Home                                                                         [] Home with Home Health                                                                         [] [facility]                                                                         [] [address]      Patient is admitted as inpatient status because of co-morbidities listed above, severity of signs and symptoms as outlined, requirement for current medical therapies and most importantly because of direct risk to patient if care not provided in a hospital setting.           Dakotah Dent MD, MD  [facility] Hospitalist

## 2022-08-27 NOTE — PROGRESS NOTES
Dr. Alanis Reasons on the unit;writer informed him of pt K+ level of 3.5;orders received for PO K+ sliding scale replacement;writer also informed him that pt is eating & drinking well ;orders received

## 2022-08-27 NOTE — PLAN OF CARE
Problem: Discharge Planning  Goal: Discharge to home or other facility with appropriate resources  8/27/2022 1049 by Keesha Medellin RN  Outcome: Progressing     Problem: Pain  Goal: Verbalizes/displays adequate comfort level or baseline comfort level  8/27/2022 1049 by Keesha Medellin RN  Outcome: Progressing     Problem: Safety - Adult  Goal: Free from fall injury  8/27/2022 1049 by Keesha Medellin RN  Outcome: Progressing

## 2022-08-28 VITALS
RESPIRATION RATE: 16 BRPM | HEART RATE: 54 BPM | HEIGHT: 73 IN | SYSTOLIC BLOOD PRESSURE: 122 MMHG | TEMPERATURE: 98.2 F | OXYGEN SATURATION: 98 % | DIASTOLIC BLOOD PRESSURE: 71 MMHG | WEIGHT: 246 LBS | BODY MASS INDEX: 32.6 KG/M2

## 2022-08-28 LAB
ABSOLUTE EOS #: 0.1 K/UL (ref 0–0.4)
ABSOLUTE LYMPH #: 1.8 K/UL (ref 1–4.8)
ABSOLUTE MONO #: 0.9 K/UL (ref 0.1–1.3)
ANION GAP SERPL CALCULATED.3IONS-SCNC: 7 MMOL/L (ref 9–17)
BASOPHILS # BLD: 1 % (ref 0–2)
BASOPHILS ABSOLUTE: 0 K/UL (ref 0–0.2)
BUN BLDV-MCNC: 7 MG/DL (ref 6–20)
CALCIUM SERPL-MCNC: 8.6 MG/DL (ref 8.6–10.4)
CHLORIDE BLD-SCNC: 105 MMOL/L (ref 98–107)
CO2: 29 MMOL/L (ref 20–31)
CREAT SERPL-MCNC: 0.79 MG/DL (ref 0.7–1.2)
EOSINOPHILS RELATIVE PERCENT: 2 % (ref 0–4)
GFR AFRICAN AMERICAN: >60 ML/MIN
GFR NON-AFRICAN AMERICAN: >60 ML/MIN
GFR SERPL CREATININE-BSD FRML MDRD: ABNORMAL ML/MIN/{1.73_M2}
GLUCOSE BLD-MCNC: 130 MG/DL (ref 70–99)
HCT VFR BLD CALC: 35 % (ref 41–53)
HEMOGLOBIN: 11.8 G/DL (ref 13.5–17.5)
LYMPHOCYTES # BLD: 26 % (ref 24–44)
MCH RBC QN AUTO: 31.5 PG (ref 26–34)
MCHC RBC AUTO-ENTMCNC: 33.6 G/DL (ref 31–37)
MCV RBC AUTO: 94 FL (ref 80–100)
MONOCYTES # BLD: 13 % (ref 1–7)
PDW BLD-RTO: 12.6 % (ref 11.5–14.9)
PLATELET # BLD: 267 K/UL (ref 150–450)
PMV BLD AUTO: 7.4 FL (ref 6–12)
POTASSIUM SERPL-SCNC: 4.4 MMOL/L (ref 3.7–5.3)
RBC # BLD: 3.73 M/UL (ref 4.5–5.9)
SEG NEUTROPHILS: 58 % (ref 36–66)
SEGMENTED NEUTROPHILS ABSOLUTE COUNT: 4 K/UL (ref 1.3–9.1)
SODIUM BLD-SCNC: 141 MMOL/L (ref 135–144)
WBC # BLD: 6.8 K/UL (ref 3.5–11)

## 2022-08-28 PROCEDURE — 85025 COMPLETE CBC W/AUTO DIFF WBC: CPT

## 2022-08-28 PROCEDURE — 6360000002 HC RX W HCPCS: Performed by: SURGERY

## 2022-08-28 PROCEDURE — 80048 BASIC METABOLIC PNL TOTAL CA: CPT

## 2022-08-28 PROCEDURE — 36415 COLL VENOUS BLD VENIPUNCTURE: CPT

## 2022-08-28 PROCEDURE — 6370000000 HC RX 637 (ALT 250 FOR IP): Performed by: SURGERY

## 2022-08-28 PROCEDURE — C9113 INJ PANTOPRAZOLE SODIUM, VIA: HCPCS | Performed by: SURGERY

## 2022-08-28 PROCEDURE — A4216 STERILE WATER/SALINE, 10 ML: HCPCS | Performed by: SURGERY

## 2022-08-28 PROCEDURE — 2580000003 HC RX 258: Performed by: SURGERY

## 2022-08-28 PROCEDURE — 6370000000 HC RX 637 (ALT 250 FOR IP): Performed by: UROLOGY

## 2022-08-28 RX ORDER — PSEUDOEPHEDRINE HCL 30 MG
100 TABLET ORAL 2 TIMES DAILY
Qty: 60 CAPSULE | Refills: 0 | Status: SHIPPED | OUTPATIENT
Start: 2022-08-28 | End: 2022-09-27

## 2022-08-28 RX ADMIN — FENTANYL CITRATE 50 MCG: 50 INJECTION, SOLUTION INTRAMUSCULAR; INTRAVENOUS at 05:07

## 2022-08-28 RX ADMIN — DOCUSATE SODIUM 100 MG: 100 CAPSULE, LIQUID FILLED ORAL at 08:17

## 2022-08-28 RX ADMIN — FENTANYL CITRATE 100 MCG: 50 INJECTION, SOLUTION INTRAMUSCULAR; INTRAVENOUS at 08:17

## 2022-08-28 RX ADMIN — TAMSULOSIN HYDROCHLORIDE 0.4 MG: 0.4 CAPSULE ORAL at 08:17

## 2022-08-28 RX ADMIN — SODIUM CHLORIDE, PRESERVATIVE FREE 10 ML: 5 INJECTION INTRAVENOUS at 09:16

## 2022-08-28 RX ADMIN — PIPERACILLIN AND TAZOBACTAM 3375 MG: 3; .375 INJECTION, POWDER, FOR SOLUTION INTRAVENOUS at 04:55

## 2022-08-28 RX ADMIN — SODIUM CHLORIDE 40 MG: 9 INJECTION, SOLUTION INTRAMUSCULAR; INTRAVENOUS; SUBCUTANEOUS at 08:17

## 2022-08-28 ASSESSMENT — PAIN DESCRIPTION - ORIENTATION: ORIENTATION: MID;LOWER

## 2022-08-28 ASSESSMENT — PAIN DESCRIPTION - LOCATION
LOCATION: RECTUM
LOCATION: RECTUM

## 2022-08-28 ASSESSMENT — PAIN DESCRIPTION - DESCRIPTORS: DESCRIPTORS: CRAMPING;CRUSHING;DISCOMFORT

## 2022-08-28 ASSESSMENT — PAIN SCALES - GENERAL
PAINLEVEL_OUTOF10: 6
PAINLEVEL_OUTOF10: 6

## 2022-08-28 ASSESSMENT — PAIN - FUNCTIONAL ASSESSMENT: PAIN_FUNCTIONAL_ASSESSMENT: PREVENTS OR INTERFERES SOME ACTIVE ACTIVITIES AND ADLS

## 2022-08-28 NOTE — PROGRESS NOTES
Patient was seen and examined. Some postop pain. Afebrile vital signs are stable. Tolerating diet. Urine output is excellent. Clear. Abdomen is soft. Perirectal wound is clean. No cellulitis. No induration. Blood work reviewed. BMP normal.  CBC pending. Discontinue Vinson. Local wound care discussed. Prescriptions called in. Discharge instructions in the chart. Office follow-up discussed. Patient is surgically stable for discharge if he is able to void.

## 2022-08-28 NOTE — PROGRESS NOTES
Writer removed pts hinojosa per order. Pt tolerated the procedure well. Then writer wrapped up pt iv site and was going to take a shower.

## 2022-08-28 NOTE — PROGRESS NOTES
Writer spoke to urology doc mauro and updated him on removal of hinojosa and that pt urinated and post residual bladder scan was 67ml he stated pt is good for discharge.

## 2022-08-28 NOTE — PLAN OF CARE
Problem: Discharge Planning  Goal: Discharge to home or other facility with appropriate resources  8/28/2022 1436 by Sharyle Mandril, RN  Outcome: Completed  Flowsheets (Taken 8/28/2022 0845)  Discharge to home or other facility with appropriate resources:   Identify barriers to discharge with patient and caregiver   Arrange for needed discharge resources and transportation as appropriate   Identify discharge learning needs (meds, wound care, etc)  8/28/2022 0821 by Sharyle Mandril, RN  Outcome: Progressing  Flowsheets (Taken 8/27/2022 2132 by Marcelina Claude, RN)  Discharge to home or other facility with appropriate resources: Identify barriers to discharge with patient and caregiver     Problem: Pain  Goal: Verbalizes/displays adequate comfort level or baseline comfort level  8/28/2022 1436 by Sharyle Mandril, RN  Outcome: Completed  8/28/2022 0821 by Sharyle Mandril, RN  Outcome: Progressing     Problem: Safety - Adult  Goal: Free from fall injury  8/28/2022 1436 by Sharyle Mandril, RN  Outcome: Completed  Flowsheets (Taken 8/28/2022 1427)  Free From Fall Injury: Instruct family/caregiver on patient safety  8/28/2022 0821 by Sharyle Mandril, RN  Outcome: Progressing  Flowsheets (Taken 8/27/2022 2302 by Marcelina Claude, RN)  Free From Fall Injury: Instruct family/caregiver on patient safety     Problem: ABCDS Injury Assessment  Goal: Absence of physical injury  8/28/2022 1436 by Sharyle Mandril, RN  Outcome: Completed  Flowsheets (Taken 8/28/2022 1427)  Absence of Physical Injury: Implement safety measures based on patient assessment  8/28/2022 0821 by Sharyle Mandril, RN  Outcome: Progressing     Problem: Skin/Tissue Integrity - Adult  Goal: Incisions, wounds, or drain sites healing without S/S of infection  8/28/2022 1436 by Sharyle Mandril, RN  Outcome: Completed  Flowsheets  Taken 8/28/2022 1427  Incisions, Wounds, or Drain Sites Healing Without Sign and Symptoms of Infection: TWICE DAILY: Assess and document dressing/incision, wound bed, drain sites and surrounding tissue  Taken 8/28/2022 0845  Incisions, Wounds, or Drain Sites Healing Without Sign and Symptoms of Infection: TWICE DAILY: Assess and document skin integrity  8/28/2022 0821 by Dina Willett RN  Outcome: Progressing  Flowsheets (Taken 8/27/2022 2132 by Carol Brownlee RN)  Incisions, Wounds, or Drain Sites Healing Without Sign and Symptoms of Infection: TWICE DAILY: Assess and document dressing/incision, wound bed, drain sites and surrounding tissue     Problem: Gastrointestinal - Adult  Goal: Maintains or returns to baseline bowel function  8/28/2022 1436 by Dina Willett RN  Outcome: Completed  Flowsheets (Taken 8/28/2022 0845)  Maintains or returns to baseline bowel function: Assess bowel function  8/28/2022 0821 by Dina Willett RN  Outcome: Progressing  Flowsheets (Taken 8/27/2022 2132 by Carol Brownlee RN)  Maintains or returns to baseline bowel function: Assess bowel function

## 2022-08-28 NOTE — PROGRESS NOTES
Urology Progress Note      Subjective: Xander Noguera is a 46 y.o. male. His/Her current Diet is: ADULT DIET; Regular. Since the previous note, the patient reports the following:  No acute issues overnight. No fevers or chills. No nausea or vomiting. No chest pain or shortness of breath. No calf pain. Pain Controlled. Ambulating. Tolerating PO Diet. Vitals and Labs:  Vitals:    08/27/22 1857 08/27/22 2208 08/28/22 0630 08/28/22 0817   BP: 118/69  120/81    Pulse: 56  (!) 49    Resp: 16 17 19 16   Temp: 98.2 °F (36.8 °C)  98.6 °F (37 °C)    TempSrc:       SpO2: 98%  96%    Weight:       Height:         I/O last 3 completed shifts: In: 1153.6 [I.V.:868.7; IV Piggyback:284.8]  Out: 4200 [Urine:4200]    Recent Labs     08/26/22  0603 08/27/22  0539 08/28/22  0603   WBC 12.1* 11.2* 6.8   HGB 12.5* 12.5* 11.8*   HCT 37.9* 37.6* 35.0*   MCV 94.0 94.3 94.0    250 267     Recent Labs     08/26/22  0603 08/27/22  0539 08/28/22  0603    137 141   K 4.1 3.5* 4.4    103 105   CO2 26 24 29   BUN 6 6 7   CREATININE 0.67* 0.63* 0.79         Physical Exam:  NAD  A/O x 3  RRR  No accessory muscles of inspiration  Abdomen soft, non-tender, non-distended. No CVA tenderness. Hinojosa in place. Clear yellow UOP. No calf pain. EPCs on. Machine turned on. Impression:    Patient Active Problem List   Diagnosis    Herniated cervical disc without myelopathy    Proctitis       Plan:  Flomax  DC hinojosa today and voiding trial  Continue to treat constipation.        Korin Barros MD  8:35 AM 8/28/2022

## 2022-08-28 NOTE — PLAN OF CARE
Problem: Discharge Planning  Goal: Discharge to home or other facility with appropriate resources  Outcome: Progressing  Flowsheets (Taken 8/27/2022 2132 by Joselito Long RN)  Discharge to home or other facility with appropriate resources: Identify barriers to discharge with patient and caregiver     Problem: Pain  Goal: Verbalizes/displays adequate comfort level or baseline comfort level  Outcome: Progressing     Problem: Safety - Adult  Goal: Free from fall injury  Outcome: Progressing  Flowsheets (Taken 8/27/2022 2302 by Joselito Long RN)  Free From Fall Injury: Instruct family/caregiver on patient safety     Problem: ABCDS Injury Assessment  Goal: Absence of physical injury  Outcome: Progressing     Problem: Skin/Tissue Integrity - Adult  Goal: Incisions, wounds, or drain sites healing without S/S of infection  Outcome: Progressing  Flowsheets (Taken 8/27/2022 2132 by Joselito Long RN)  Incisions, Wounds, or Drain Sites Healing Without Sign and Symptoms of Infection: TWICE DAILY: Assess and document dressing/incision, wound bed, drain sites and surrounding tissue     Problem: Gastrointestinal - Adult  Goal: Maintains or returns to baseline bowel function  Outcome: Progressing  Flowsheets (Taken 8/27/2022 2132 by Joselito Long RN)  Maintains or returns to baseline bowel function: Assess bowel function

## 2022-09-08 ENCOUNTER — OFFICE VISIT (OUTPATIENT)
Dept: UROLOGY | Age: 52
End: 2022-09-08
Payer: COMMERCIAL

## 2022-09-08 VITALS
BODY MASS INDEX: 32.34 KG/M2 | DIASTOLIC BLOOD PRESSURE: 70 MMHG | SYSTOLIC BLOOD PRESSURE: 100 MMHG | OXYGEN SATURATION: 98 % | HEIGHT: 73 IN | HEART RATE: 55 BPM | WEIGHT: 244 LBS

## 2022-09-08 DIAGNOSIS — R39.14 BENIGN PROSTATIC HYPERPLASIA WITH INCOMPLETE BLADDER EMPTYING: Primary | ICD-10-CM

## 2022-09-08 DIAGNOSIS — N40.1 BENIGN PROSTATIC HYPERPLASIA WITH INCOMPLETE BLADDER EMPTYING: Primary | ICD-10-CM

## 2022-09-08 PROCEDURE — 99204 OFFICE O/P NEW MOD 45 MIN: CPT | Performed by: UROLOGY

## 2022-09-08 RX ORDER — TAMSULOSIN HYDROCHLORIDE 0.4 MG/1
0.4 CAPSULE ORAL DAILY
Qty: 30 CAPSULE | Refills: 3 | Status: SHIPPED | OUTPATIENT
Start: 2022-09-08

## 2022-09-08 ASSESSMENT — ENCOUNTER SYMPTOMS
SHORTNESS OF BREATH: 0
WHEEZING: 0
COUGH: 0

## 2022-09-08 NOTE — DISCHARGE SUMMARY
Hospitalist Discharge Summary    Karthik Nixon  :  1970  MRN:  [id number]    Admit date:  2022  Discharge date:  2022    Admitting Physician:  Cristina Joshua MD    Discharge Diagnoses:   Patient Active Problem List   Diagnosis    Herniated cervical disc without myelopathy    Proctitis        Admission Condition:  fair      Discharged Condition:  good    Hospital Course/Treatments   admitted with abdominal pain, pt had some post op pain, pt was afebrile and tolerating reg diet, no cellulitis seen ,wound looked good, pt sent home with following meds    Discharge Medications:         Medication List        START taking these medications      docusate 100 MG Caps  Commonly known as: COLACE, DULCOLAX  Take 100 mg by mouth 2 times daily     metroNIDAZOLE 500 MG tablet  Commonly known as: FLAGYL  Take 1 tablet by mouth 3 times daily for 14 days     ondansetron 4 MG tablet  Commonly known as: ZOFRAN  Take 1 tablet by mouth daily as needed for Nausea or Vomiting            CONTINUE taking these medications      atorvastatin 40 MG tablet  Commonly known as: LIPITOR     ciprofloxacin 500 MG tablet  Commonly known as: CIPRO  Take 1 tablet by mouth 2 times daily for 14 days            STOP taking these medications      naproxen 500 MG tablet  Commonly known as: NAPROSYN     oxyCODONE-acetaminophen 5-325 MG per tablet  Commonly known as: Percocet     varenicline 1 MG tablet  Commonly known as: CHANTIX            ASK your doctor about these medications      metaxalone 800 MG tablet  Commonly known as: SKELAXIN     oxyCODONE-acetaminophen 5-325 MG per tablet  Commonly known as: Percocet  Take 1 tablet by mouth every 6 hours as needed for Pain for up to 7 days. . Take lowest dose possible to manage pain  Ask about: Should I take this medication?                Where to Get Your Medications        These medications were sent to [facility] [address] [place] [address]      Phone: [phone]   ciprofloxacin 500 MG tablet  docusate 100 MG Caps  metroNIDAZOLE 500 MG tablet  ondansetron 4 MG tablet  oxyCODONE-acetaminophen 5-325 MG per tablet         Consults:  IP CONSULT TO GENERAL SURGERY  IP CONSULT TO GENERAL SURGERY  IP CONSULT TO INTERNAL MEDICINE  IP CONSULT TO UROLOGY    Significant Diagnostic Studies:  XR ABDOMEN (KUB) (SINGLE AP VIEW)    Result Date: 8/23/2022  EXAMINATION: ONE SUPINE XRAY VIEW(S) OF THE ABDOMEN 8/23/2022 10:26 am COMPARISON: None. HISTORY: ORDERING SYSTEM PROVIDED HISTORY: Stomach ache TECHNOLOGIST PROVIDED HISTORY: Reason for Exam: constipation x 3 days, abd pain x 1 week FINDINGS: The gas pattern is unremarkable. No renal stones are seen. There is no free air. The bones are intact. Nonspecific KUB     CT ABDOMEN PELVIS W IV CONTRAST Additional Contrast? None    Result Date: 8/24/2022  EXAMINATION: CT OF THE ABDOMEN AND PELVIS WITH CONTRAST 8/24/2022 2:37 am TECHNIQUE: CT of the abdomen and pelvis was performed with the administration of intravenous contrast. Multiplanar reformatted images are provided for review. Automated exposure control, iterative reconstruction, and/or weight based adjustment of the mA/kV was utilized to reduce the radiation dose to as low as reasonably achievable. COMPARISON: None. HISTORY: ORDERING SYSTEM PROVIDED HISTORY: lower abd pain, fever TECHNOLOGIST PROVIDED HISTORY: lower abd pain, fever Decision Support Exception - unselect if not a suspected or confirmed emergency medical condition->Emergency Medical Condition (MA) Reason for Exam: pt. c/o LLQ/RLQ pain with difficulty passing stool. pt. also has fever Relevant Medical/Surgical History: No previous hx. per pt FINDINGS: Lower Chest:  Visualized portion of the lower chest demonstrates no acute abnormality. Organs: Liver, gallbladder, spleen, adrenals, kidneys, pancreas, bile ducts, stomach are without acute process.   Ureters are nondilated. The bladder is within normal limits. Normal prostate. GI/Bowel: Wall thickening is present in the distal rectum with adjacent fat stranding, such as on series 2, image 272, with extension to the anus. A small focus of fluid may be present at 6 o'clock measuring 2.2 x 1.5 cm on series 2, image 194. There is liquid stool in the remainder of the colon which is mildly distended proximally. The appendix is not well visualized. No evidence of small-bowel obstruction. Pelvis: See findings above. Peritoneum/Retroperitoneum: Mild atherosclerosis. Patent vasculature. Nonspecific presacral edema. No free fluid or free air. No lymphadenopathy. Bones/Soft Tissues: Tiny periumbilical hernia. No acute osseous abnormality. 1. Findings compatible with severe proctitis of the distal rectum. A small perirectal abscess may present just superior to the anal verge at the 6 o'clock position, although visualization is limited and this also could represent intraluminal fluid. Further evaluation with direct visualization is recommended to exclude an underlying neoplasm. 2. Mild distention of the proximal colon with gas and liquid stool. No small bowel obstruction. Disposition:   home    Discharge Instructions: Activity: activity as tolerated  Diet:  regular diet    Follow up with Fco Bernal DO in 2 weeks.     Signed:  Chelsi Orantes MD  9/8/2022, 7:45 PM    Time spent in discharge of this pt is more than 30 minutes in examination,evaluvation,  counseling and review of medication and discharge plan

## 2022-09-08 NOTE — PROGRESS NOTES
[address] [facility] [id number]  Dept:  Emil Rizo [facility] Urology Office Note - New Patient    Patient:  Rohit Sandoval  YOB: 1970  Date: 9/8/2022    The patient is a 46 y.o. male who presentstoday for evaluation of the following problems:   Chief Complaint   Patient presents with    Benign Prostatic Hypertrophy    New Patient    referred by Wilner Schneider DO.    HPI  Here for bph. Had recent anal abscess and was placed on abx. Doing better, did have temp urine retention at that time. Now feels better. Generally urinates ok    (Patient's old records have been requested, reviewed and summarized in today's note.)    Summary of old records: N/A    History: N/A    ProceduresToday: N/A    Urinalysis today:  No results found for this visit on 09/08/22. AUA Symptom Score (9/8/2022):                                Last BUN andcreatinine:  Lab Results   Component Value Date    BUN 7 08/28/2022     Lab Results   Component Value Date    CREATININE 0.79 08/28/2022       Additional Lab/Culture results: none    Reviewed during this Office Visit: none  (results were independently reviewed byphysician and radiology report verified)    PAST MEDICAL, FAMILY AND SOCIAL HISTORY:  Past Medical History:   Diagnosis Date    GERD (gastroesophageal reflux disease)     not treated     Past Surgical History:   Procedure Laterality Date    ABDOMEN SURGERY      laporoscopy  for acid reflux    BACK SURGERY      x3    COLONOSCOPY N/A 8/26/2022    COLONOSCOPY DIAGNOSTIC WITH EUA performed by Aleksandr Da Silva MD at [facility] 12 19 13    post cervical laminectomy C5-6 C 6-7    RECTAL SURGERY  8/26/2022    RECTAL PERIRECTAL INCISION AND DRAINAGE performed by Aleksandr Da Silva MD at [address] [place] [address]       Family History   Problem Relation Age of Onset    Diabetes Mother     Cancer Father Outpatient Medications Marked as Taking for the 9/8/22 encounter (Office Visit) with Shin Prince MD   Medication Sig Dispense Refill    tamsulosin (FLOMAX) 0.4 MG capsule Take 1 capsule by mouth daily 30 capsule 3    docusate sodium (COLACE, DULCOLAX) 100 MG CAPS Take 100 mg by mouth 2 times daily 60 capsule 0    ciprofloxacin (CIPRO) 500 MG tablet Take 1 tablet by mouth 2 times daily for 14 days 28 tablet 0    metroNIDAZOLE (FLAGYL) 500 MG tablet Take 1 tablet by mouth 3 times daily for 14 days 42 tablet 0    atorvastatin (LIPITOR) 40 MG tablet Take 40 mg by mouth daily         Patient has no known allergies. Social History     Tobacco Use   Smoking Status Former    Packs/day: 1.50    Years: 20.00    Pack years: 30.00    Types: Cigarettes    Quit date: 2012    Years since quitting: 10.6   Smokeless Tobacco Never   Tobacco Comments    down to three cigarettes per day      (If patient a smoker, smoking cessation counseling offered)   Social History     Substance and Sexual Activity   Alcohol Use Yes    Alcohol/week: 5.0 standard drinks    Types: 5 Cans of beer per week    Comment: daily       REVIEW OF SYSTEMS:  Review of Systems    Physical Exam:    This a 46 y.o. female      Vitals:    09/08/22 1343   BP: 100/70   Pulse: 55   SpO2: 98%     Body mass index is 32.19 kg/m². Physical Exam  Constitutional: Patient in no acute distress, ggod grooming, appropriately dressed  Neuro: Alert and oriented to person, place and time. Psych:Mood normal, affect normal  Skin: No rash noted  HEENT: Head: Normocephalic and atraumatic,Conjunctivae and EOM are normal,Nose- normal, Right/Left External Ear: normal, Mouth: Mucosa Moist  Neck: Supple  Lungs: Respiratory effort is normal  Cardiovascular: strong and regular, no lower leg edema  Abdomen: Soft, non-tender, non-distended with no CVA,    Lymphatics: No cervical palpable lymphadenopathy. Assessment and Plan      1.  Benign prostatic hyperplasia with incomplete bladder emptying            Plan:        Prescriptions Ordered:  Orders Placed This Encounter   Medications    tamsulosin (FLOMAX) 0.4 MG capsule     Sig: Take 1 capsule by mouth daily     Dispense:  30 capsule     Refill:  3      Orders Placed:  Orders Placed This Encounter   Procedures    PSA, Diagnostic     Standing Status:   Future     Standing Expiration Date:   9/8/2023            Maddy Suh MD    Agree with the ROS entered by the MA.

## 2022-09-08 NOTE — PROGRESS NOTES
Review of Systems   Constitutional:  Negative for chills, fatigue and fever. Respiratory:  Negative for cough, shortness of breath and wheezing. Cardiovascular:  Negative for chest pain. Neurological:  Negative for dizziness, light-headedness, numbness and headaches.

## 2022-11-04 ENCOUNTER — HOSPITAL ENCOUNTER (OUTPATIENT)
Age: 52
Discharge: HOME | End: 2022-11-04
Payer: COMMERCIAL

## 2022-11-04 DIAGNOSIS — R39.14 BENIGN PROSTATIC HYPERPLASIA WITH INCOMPLETE BLADDER EMPTYING: ICD-10-CM

## 2022-11-04 DIAGNOSIS — N40.1 BENIGN PROSTATIC HYPERPLASIA WITH INCOMPLETE BLADDER EMPTYING: ICD-10-CM

## 2022-11-04 LAB — PROSTATE SPECIFIC ANTIGEN: 1.11 NG/ML

## 2022-11-04 PROCEDURE — 84153 ASSAY OF PSA TOTAL: CPT

## 2022-11-04 PROCEDURE — 36415 COLL VENOUS BLD VENIPUNCTURE: CPT

## 2022-11-10 ENCOUNTER — OFFICE VISIT (OUTPATIENT)
Dept: UROLOGY | Age: 52
End: 2022-11-10
Payer: COMMERCIAL

## 2022-11-10 VITALS — WEIGHT: 244 LBS | HEIGHT: 73 IN | BODY MASS INDEX: 32.34 KG/M2

## 2022-11-10 DIAGNOSIS — R39.14 BENIGN PROSTATIC HYPERPLASIA WITH INCOMPLETE BLADDER EMPTYING: Primary | ICD-10-CM

## 2022-11-10 DIAGNOSIS — N40.1 BENIGN PROSTATIC HYPERPLASIA WITH INCOMPLETE BLADDER EMPTYING: Primary | ICD-10-CM

## 2022-11-10 DIAGNOSIS — Z87.898 HISTORY OF URINARY RETENTION: ICD-10-CM

## 2022-11-10 PROCEDURE — 99213 OFFICE O/P EST LOW 20 MIN: CPT | Performed by: UROLOGY

## 2022-11-10 ASSESSMENT — ENCOUNTER SYMPTOMS
NAUSEA: 0
SHORTNESS OF BREATH: 0
BACK PAIN: 0
ABDOMINAL PAIN: 0
EYES NEGATIVE: 1
EYE REDNESS: 0
RESPIRATORY NEGATIVE: 1
GASTROINTESTINAL NEGATIVE: 1
DIARRHEA: 0
WHEEZING: 0
COUGH: 0
VOMITING: 0
EYE PAIN: 0
CONSTIPATION: 0

## 2022-11-10 NOTE — PROGRESS NOTES
[address] [address]  Dept: 92 Emil R[facility] Urology Office Note - Established    Patient:  Som Brown  YOB: 1970  Date: 11/10/2022    The patient is a 46 y.o. male who presents todayfor evaluation of the following problems:   Chief Complaint   Patient presents with    Follow-up     2 month with PSA       HPI  Patient is presenting for f/u BPH. Had episode of urinary retention at the time of anal abscess. This issue has resolved. He was started on flomax but stopped because it did not make a difference. Stream is strong and steady, feels he is emptying. Denies frequency, urgency, or leakage. Denies nocturia. No hematuria or dysuria. PSA 1.1, does have fhx prostate cancer. Denies any further urological concerns today. Summary of old records: N/A    Additional History: N/A    Procedures Today: N/A    Urinalysis today:  No results found for this visit on 11/10/22.   Last several PSA's:  Lab Results   Component Value Date    PSA 1.11 11/04/2022    PSA 1.41 05/07/2021    PSA 1.57 09/20/2017     Last total testosterone:  Lab Results   Component Value Date    TESTOSTERONE 458 09/20/2017       Last BUN and creatinine:  Lab Results   Component Value Date    BUN 7 08/28/2022     Lab Results   Component Value Date    CREATININE 0.79 08/28/2022       Additional Lab/Culture results: none    Imaging Reviewed during this Office Visit: none  (results were independently reviewed by physician and radiology report verified)    PAST MEDICAL, FAMILY AND SOCIAL HISTORY UPDATE:  Past Medical History:   Diagnosis Date    GERD (gastroesophageal reflux disease)     not treated     Past Surgical History:   Procedure Laterality Date    ABDOMEN SURGERY      laporoscopy  for acid reflux    BACK SURGERY      x3    COLONOSCOPY N/A 8/26/2022    COLONOSCOPY DIAGNOSTIC WITH EUA performed by Tete Jones Davin Alvarez MD at [address] [facility] 12 19 13    post cervical laminectomy C5-6 C 6-7    RECTAL SURGERY  8/26/2022    RECTAL PERIRECTAL INCISION AND DRAINAGE performed by Kathy Silva MD at [address] [place] [address]       Family History   Problem Relation Age of Onset    Diabetes Mother     Cancer Father      No outpatient medications have been marked as taking for the 11/10/22 encounter (Office Visit) with Brian Aponte MD.       Patient has no known allergies. Social History     Tobacco Use   Smoking Status Former    Packs/day: 1.50    Years: 20.00    Pack years: 30.00    Types: Cigarettes    Quit date: 2012    Years since quitting: 10.8   Smokeless Tobacco Never   Tobacco Comments    down to three cigarettes per day     (Ifpatient a smoker, smoking cessation counseling offered)    Social History     Substance and Sexual Activity   Alcohol Use Yes    Alcohol/week: 5.0 standard drinks    Types: 5 Cans of beer per week    Comment: daily       REVIEW OF SYSTEMS:  Review of Systems    Physical Exam:    There were no vitals filed for this visit. Body mass index is 32.19 kg/m². Patient is a 46 y.o. male in no acute distress and alert and oriented to person, place and time. Physical Exam  Constitutional: Patient in no acute distress. Neuro: Alert and oriented to person, place and time. Psych: Mood normal, affect normal  Lungs: Respiratory effort is normal  Cardiovascular: Warm & Pink  Abdomen: Soft, non-tender, non-distended  Bladder non-tender and not distended. Musculoskeletal: Normal gait and station      Assessment and Plan      1. Benign prostatic hyperplasia with incomplete bladder emptying    2. History of urinary retention           Plan:     Urinary retention was r/t anal abscess- resolved. His urinary symptoms are back to baseline. Flomax didn't make a difference and he is not taking it. PSA is low at 1.1- (+ family hx). Prefers PCP to monitor.    Will f/u PRN, we are happy to see him again in the future if needed. Return if symptoms worsen or fail to improve. Prescriptions Ordered:  No orders of the defined types were placed in this encounter. Orders Placed:  Orders Placed This Encounter   Procedures    PSA Screening     Standing Status:   Future     Standing Expiration Date:   11/10/2023           PERRI Correia    Reviewed and agree with the ROS entered by the MA.

## 2022-12-05 ENCOUNTER — HOSPITAL ENCOUNTER (OUTPATIENT)
Dept: PREADMISSION TESTING | Age: 52
Discharge: HOME | End: 2022-12-09

## 2022-12-05 VITALS — HEIGHT: 73 IN | BODY MASS INDEX: 32.47 KG/M2 | WEIGHT: 245 LBS

## 2022-12-05 NOTE — PROGRESS NOTES

## 2022-12-15 NOTE — PRE-PROCEDURE INSTRUCTIONS
No answer, left message ? Unable to leave message ? When were you told to arrive at hospital ?  0530    Do you have a  ? Y    Are you on any blood thinners ? N          If yes when did you stop taking ? Do you have your prep Rx filled and instruction ? Y    Nothing to eat the day before , only clear liquids. Y    Are you experiencing any covid symptoms ? N    Do you have any infections or rash we should be aware of ?N      Do you have the Hibiclens soap to use the night before and the morning of surgery ? Nothing to eat or drink after midnight, only a sip of water to take any medication instructed to take the night before. Wear comfortable clothing, leave any valuables at home, remove any jewelry and body piercing .  Emmanuel Barreto

## 2022-12-16 ENCOUNTER — ANESTHESIA EVENT (OUTPATIENT)
Dept: ENDOSCOPY | Age: 52
End: 2022-12-16
Payer: COMMERCIAL

## 2022-12-19 ENCOUNTER — ANESTHESIA (OUTPATIENT)
Dept: ENDOSCOPY | Age: 52
End: 2022-12-19
Payer: COMMERCIAL

## 2022-12-19 ENCOUNTER — HOSPITAL ENCOUNTER (OUTPATIENT)
Age: 52
Setting detail: OUTPATIENT SURGERY
Discharge: HOME | End: 2022-12-19
Attending: SURGERY | Admitting: SURGERY
Payer: COMMERCIAL

## 2022-12-19 VITALS
OXYGEN SATURATION: 96 % | TEMPERATURE: 97.9 F | WEIGHT: 245 LBS | HEART RATE: 55 BPM | HEIGHT: 73 IN | BODY MASS INDEX: 32.47 KG/M2 | RESPIRATION RATE: 18 BRPM | SYSTOLIC BLOOD PRESSURE: 141 MMHG | DIASTOLIC BLOOD PRESSURE: 82 MMHG

## 2022-12-19 PROCEDURE — 2500000003 HC RX 250 WO HCPCS: Performed by: NURSE ANESTHETIST, CERTIFIED REGISTERED

## 2022-12-19 PROCEDURE — 2580000003 HC RX 258: Performed by: ANESTHESIOLOGY

## 2022-12-19 PROCEDURE — 3700000000 HC ANESTHESIA ATTENDED CARE: Performed by: SURGERY

## 2022-12-19 PROCEDURE — 3700000001 HC ADD 15 MINUTES (ANESTHESIA): Performed by: SURGERY

## 2022-12-19 PROCEDURE — 7100000000 HC PACU RECOVERY - FIRST 15 MIN: Performed by: SURGERY

## 2022-12-19 PROCEDURE — 7100000030 HC ASPR PHASE II RECOVERY - FIRST 15 MIN: Performed by: SURGERY

## 2022-12-19 PROCEDURE — 6360000002 HC RX W HCPCS: Performed by: NURSE ANESTHETIST, CERTIFIED REGISTERED

## 2022-12-19 PROCEDURE — 7100000010 HC PHASE II RECOVERY - FIRST 15 MIN: Performed by: SURGERY

## 2022-12-19 PROCEDURE — 2709999900 HC NON-CHARGEABLE SUPPLY: Performed by: SURGERY

## 2022-12-19 PROCEDURE — 3609027000 HC COLONOSCOPY: Performed by: SURGERY

## 2022-12-19 PROCEDURE — 7100000011 HC PHASE II RECOVERY - ADDTL 15 MIN: Performed by: SURGERY

## 2022-12-19 PROCEDURE — 7100000001 HC PACU RECOVERY - ADDTL 15 MIN: Performed by: SURGERY

## 2022-12-19 PROCEDURE — 7100000031 HC ASPR PHASE II RECOVERY - ADDTL 15 MIN: Performed by: SURGERY

## 2022-12-19 RX ORDER — SODIUM CHLORIDE 0.9 % (FLUSH) 0.9 %
5-40 SYRINGE (ML) INJECTION EVERY 12 HOURS SCHEDULED
Status: DISCONTINUED | OUTPATIENT
Start: 2022-12-19 | End: 2022-12-19 | Stop reason: HOSPADM

## 2022-12-19 RX ORDER — FENTANYL CITRATE 0.05 MG/ML
25 INJECTION, SOLUTION INTRAMUSCULAR; INTRAVENOUS EVERY 5 MIN PRN
Status: DISCONTINUED | OUTPATIENT
Start: 2022-12-19 | End: 2022-12-19 | Stop reason: HOSPADM

## 2022-12-19 RX ORDER — M-VIT,TX,IRON,MINS/CALC/FOLIC 27MG-0.4MG
1 TABLET ORAL DAILY
COMMUNITY

## 2022-12-19 RX ORDER — DEXAMETHASONE SODIUM PHOSPHATE 4 MG/ML
INJECTION, SOLUTION INTRA-ARTICULAR; INTRALESIONAL; INTRAMUSCULAR; INTRAVENOUS; SOFT TISSUE PRN
Status: DISCONTINUED | OUTPATIENT
Start: 2022-12-19 | End: 2022-12-19 | Stop reason: SDUPTHER

## 2022-12-19 RX ORDER — DIPHENHYDRAMINE HYDROCHLORIDE 50 MG/ML
12.5 INJECTION INTRAMUSCULAR; INTRAVENOUS
Status: DISCONTINUED | OUTPATIENT
Start: 2022-12-19 | End: 2022-12-19 | Stop reason: HOSPADM

## 2022-12-19 RX ORDER — ONDANSETRON 2 MG/ML
INJECTION INTRAMUSCULAR; INTRAVENOUS PRN
Status: DISCONTINUED | OUTPATIENT
Start: 2022-12-19 | End: 2022-12-19 | Stop reason: SDUPTHER

## 2022-12-19 RX ORDER — ONDANSETRON 2 MG/ML
4 INJECTION INTRAMUSCULAR; INTRAVENOUS
Status: DISCONTINUED | OUTPATIENT
Start: 2022-12-19 | End: 2022-12-19 | Stop reason: HOSPADM

## 2022-12-19 RX ORDER — SODIUM CHLORIDE 0.9 % (FLUSH) 0.9 %
5-40 SYRINGE (ML) INJECTION PRN
Status: DISCONTINUED | OUTPATIENT
Start: 2022-12-19 | End: 2022-12-19 | Stop reason: HOSPADM

## 2022-12-19 RX ORDER — LIDOCAINE HYDROCHLORIDE 20 MG/ML
INJECTION, SOLUTION EPIDURAL; INFILTRATION; INTRACAUDAL; PERINEURAL PRN
Status: DISCONTINUED | OUTPATIENT
Start: 2022-12-19 | End: 2022-12-19 | Stop reason: SDUPTHER

## 2022-12-19 RX ORDER — SODIUM CHLORIDE, SODIUM LACTATE, POTASSIUM CHLORIDE, CALCIUM CHLORIDE 600; 310; 30; 20 MG/100ML; MG/100ML; MG/100ML; MG/100ML
INJECTION, SOLUTION INTRAVENOUS CONTINUOUS
Status: DISCONTINUED | OUTPATIENT
Start: 2022-12-19 | End: 2022-12-19 | Stop reason: HOSPADM

## 2022-12-19 RX ORDER — SODIUM CHLORIDE 9 MG/ML
INJECTION, SOLUTION INTRAVENOUS PRN
Status: DISCONTINUED | OUTPATIENT
Start: 2022-12-19 | End: 2022-12-19 | Stop reason: HOSPADM

## 2022-12-19 RX ORDER — METOCLOPRAMIDE HYDROCHLORIDE 5 MG/ML
10 INJECTION INTRAMUSCULAR; INTRAVENOUS
Status: DISCONTINUED | OUTPATIENT
Start: 2022-12-19 | End: 2022-12-19 | Stop reason: HOSPADM

## 2022-12-19 RX ORDER — PROPOFOL 10 MG/ML
INJECTION, EMULSION INTRAVENOUS PRN
Status: DISCONTINUED | OUTPATIENT
Start: 2022-12-19 | End: 2022-12-19 | Stop reason: SDUPTHER

## 2022-12-19 RX ORDER — LIDOCAINE HYDROCHLORIDE 10 MG/ML
1 INJECTION, SOLUTION EPIDURAL; INFILTRATION; INTRACAUDAL; PERINEURAL
Status: DISCONTINUED | OUTPATIENT
Start: 2022-12-19 | End: 2022-12-19 | Stop reason: HOSPADM

## 2022-12-19 RX ADMIN — SODIUM CHLORIDE, POTASSIUM CHLORIDE, SODIUM LACTATE AND CALCIUM CHLORIDE: 600; 310; 30; 20 INJECTION, SOLUTION INTRAVENOUS at 06:18

## 2022-12-19 RX ADMIN — ONDANSETRON 4 MG: 2 INJECTION INTRAMUSCULAR; INTRAVENOUS at 07:30

## 2022-12-19 RX ADMIN — PROPOFOL 200 MG: 10 INJECTION, EMULSION INTRAVENOUS at 07:33

## 2022-12-19 RX ADMIN — PROPOFOL 200 MG: 10 INJECTION, EMULSION INTRAVENOUS at 07:36

## 2022-12-19 RX ADMIN — DEXAMETHASONE SODIUM PHOSPHATE 8 MG: 4 INJECTION, SOLUTION INTRAMUSCULAR; INTRAVENOUS at 07:53

## 2022-12-19 RX ADMIN — LIDOCAINE HYDROCHLORIDE 80 MG: 20 INJECTION, SOLUTION EPIDURAL; INFILTRATION; INTRACAUDAL; PERINEURAL at 07:32

## 2022-12-19 ASSESSMENT — ENCOUNTER SYMPTOMS
SHORTNESS OF BREATH: 0
COUGH: 0
WHEEZING: 0
SORE THROAT: 0
BACK PAIN: 0

## 2022-12-19 ASSESSMENT — PAIN - FUNCTIONAL ASSESSMENT: PAIN_FUNCTIONAL_ASSESSMENT: 0-10

## 2022-12-19 NOTE — ANESTHESIA POSTPROCEDURE EVALUATION
POST- ANESTHESIA EVALUATION       Pt Name: Tenzin Sanchez  MRN: [id number]  YOB: 1970  Date of evaluation: 12/19/2022  Time:  10:08 AM      BP (!) 141/82   Pulse 55   Temp 97.9 °F (36.6 °C) (Temporal)   Resp 18   Ht 6' 1\" (1.854 m)   Wt 245 lb (111.1 kg)   SpO2 96%   BMI 32.32 kg/m²      Consciousness Level  Awake  Cardiopulmonary Status  Stable  Pain Adequately Treated YES  Nausea / Vomiting  NO  Adequate Hydration  YES  Anesthesia Related Complications NONE      Electronically signed by Ericka Velez MD on 12/19/2022 at 10:08 AM       Department of Anesthesiology  Postprocedure Note    Patient: Tenzin Sanchez  MRN: [id number]  YOB: 1970  Date of evaluation: 12/19/2022      Procedure Summary     Date: 12/19/22 Room / Location: [address] [place] [address] 04 / 250 [facility] ENDO    Anesthesia Start: 4946 Anesthesia Stop: 9630    Procedure: COLORECTAL CANCER SCREENING, NOT HIGH RISK Diagnosis:       Colon cancer screening      (Colon cancer screening [Z12.11])    Surgeons: Vadim Urias MD Responsible Provider: Ericka Velez MD    Anesthesia Type: General ASA Status: 2          Anesthesia Type: General    Isidro Phase I: Isidro Score: 10    Isidro Phase II: Isidro Score: 10  During insertion of LMA by the resident, a small laceration noticed at the frenulum, no bleeding or swelling. Post op I examined the patient's tongue  and noticed a small laceration stable and no bleeding. I discussed it with the patient and his wife and assured them no further Rx is required.     Anesthesia Post Evaluation

## 2022-12-19 NOTE — ANESTHESIA PRE PROCEDURE
Department of Anesthesiology  Preprocedure Note       Name:  Sarah Beth Rivera   Age:  46 y.o.  :  1970                                          MRN:  [id number]         Date:  2022      Surgeon: Ale Patel):  Nicholas Paniagua MD    Procedure: Procedure(s):  COLORECTAL CANCER SCREENING, NOT HIGH RISK    Medications prior to admission:   Prior to Admission medications    Medication Sig Start Date End Date Taking? Authorizing Provider   Multiple Vitamins-Minerals (THERAPEUTIC MULTIVITAMIN-MINERALS) tablet Take 1 tablet by mouth daily   Yes Historical Provider, MD   atorvastatin (LIPITOR) 40 MG tablet Take 40 mg by mouth daily    Historical Provider, MD   naproxen (NAPROSYN) 500 MG tablet Take 1 tablet by mouth 2 times daily (with meals). Ok to resume on   Patient not taking: No sig reported 13  Gildardo An MD   varenicline (CHANTIX) 1 MG tablet Take 1 mg by mouth 2 times daily. Patient not taking: Reported on 2022  Historical Provider, MD       Current medications:    Current Facility-Administered Medications   Medication Dose Route Frequency Provider Last Rate Last Admin    lidocaine PF 1 % injection 1 mL  1 mL IntraDERmal Once PRN Guru Smith MD        lactated ringers infusion   IntraVENous Continuous [place]  mL/hr at 22 0618 New Bag at 22 0618    sodium chloride flush 0.9 % injection 5-40 mL  5-40 mL IntraVENous 2 times per day Guru Smith MD        sodium chloride flush 0.9 % injection 5-40 mL  5-40 mL IntraVENous PRN Carissa Segovia MD        0.9 % sodium chloride infusion   IntraVENous PRN Guru Smith MD           Allergies:     Allergies   Allergen Reactions    Seasonal      Other reaction(s): Unknown       Problem List:    Patient Active Problem List   Diagnosis Code    Herniated cervical disc without myelopathy M50.20    Proctitis K62.89       Past Medical History:        Diagnosis Date    GERD (gastroesophageal reflux disease)     not treated    Hyperlipidemia        Past Surgical History:        Procedure Laterality Date    ABDOMEN SURGERY      laporoscopy  for acid reflux    BACK SURGERY      x3    COLONOSCOPY N/A 08/26/2022    COLONOSCOPY DIAGNOSTIC WITH EUA performed by Ally Zelaya MD at [address] N/A 12/19/2013    post cervical laminectomy C5-6 C 6-7    RECTAL SURGERY  08/26/2022    RECTAL PERIRECTAL INCISION AND DRAINAGE performed by Ally Zelaya MD at [address] [facility] EXTRACTION         Social History:    Social History     Tobacco Use    Smoking status: Former     Packs/day: 1.50     Years: 20.00     Pack years: 30.00     Types: Cigarettes     Quit date: 2012     Years since quitting: 10.9    Smokeless tobacco: Never    Tobacco comments:     down to three cigarettes per day   Substance Use Topics    Alcohol use: Yes     Alcohol/week: 5.0 standard drinks     Types: 5 Cans of beer per week     Comment: daily                                Counseling given: Not Answered  Tobacco comments: down to three cigarettes per day      Vital Signs (Current):   Vitals:    12/19/22 0559   BP: 130/86   Pulse: 53   Resp: 16   Temp: 97.1 °F (36.2 °C)   TempSrc: Infrared   SpO2: 98%   Weight: 245 lb (111.1 kg)   Height: 6' 1\" (1.854 m)                                              BP Readings from Last 3 Encounters:   12/19/22 130/86   09/08/22 100/70   08/28/22 122/71       NPO Status: Time of last liquid consumption: 2230                        Time of last solid consumption: 1900                        Date of last liquid consumption: 12/18/22                        Date of last solid food consumption: 12/17/22    BMI:   Wt Readings from Last 3 Encounters:   12/19/22 245 lb (111.1 kg)   12/05/22 245 lb (111.1 kg)   11/10/22 244 lb (110.7 kg)     Body mass index is 32.32 kg/m².     CBC:   Lab Results   Component Value Date/Time    WBC 6.8 08/28/2022 06:03 AM    RBC 3.73 08/28/2022 06:03 AM    HGB 11.8 08/28/2022 06:03 AM    HCT 35.0 08/28/2022 06:03 AM    MCV 94.0 08/28/2022 06:03 AM    RDW 12.6 08/28/2022 06:03 AM     08/28/2022 06:03 AM       CMP:   Lab Results   Component Value Date/Time     08/28/2022 06:03 AM    K 4.4 08/28/2022 06:03 AM     08/28/2022 06:03 AM    CO2 29 08/28/2022 06:03 AM    BUN 7 08/28/2022 06:03 AM    CREATININE 0.79 08/28/2022 06:03 AM    GFRAA >60 08/28/2022 06:03 AM    LABGLOM >60 08/28/2022 06:03 AM    GLUCOSE 130 08/28/2022 06:03 AM    PROT 7.4 08/24/2022 01:05 AM    CALCIUM 8.6 08/28/2022 06:03 AM    BILITOT 0.69 08/24/2022 01:05 AM    ALKPHOS 110 08/24/2022 01:05 AM    AST 20 08/24/2022 01:05 AM    ALT 23 08/24/2022 01:05 AM       POC Tests: No results for input(s): POCGLU, POCNA, POCK, POCCL, POCBUN, POCHEMO, POCHCT in the last 72 hours.     Coags:   Lab Results   Component Value Date/Time    PROTIME 10.4 12/13/2013 10:30 AM    INR 1.0 12/13/2013 10:30 AM    APTT 25.5 12/13/2013 10:30 AM       HCG (If Applicable): No results found for: PREGTESTUR, PREGSERUM, HCG, HCGQUANT     ABGs: No results found for: PHART, PO2ART, RHR2RQA, KFR0GUE, BEART, Y2SVTGPQ     Type & Screen (If Applicable):  No results found for: LABABO, LABRH    Drug/Infectious Status (If Applicable):  No results found for: HIV, HEPCAB    COVID-19 Screening (If Applicable): No results found for: COVID19        Anesthesia Evaluation  Patient summary reviewed and Nursing notes reviewed no history of anesthetic complications:   Airway: Mallampati: III  TM distance: >3 FB   Neck ROM: full  Mouth opening: > = 3 FB   Dental:          Pulmonary:Negative Pulmonary ROS breath sounds clear to auscultation                             Cardiovascular:    (+) hyperlipidemia      ECG reviewed  Rhythm: regular  Rate: normal                    Neuro/Psych:   Negative Neuro/Psych ROS              GI/Hepatic/Renal:   (+) GERD:, bowel prep, Endo/Other:                     Abdominal:             Vascular: negative vascular ROS. Other Findings:           Anesthesia Plan      general     ASA 2       Induction: intravenous. Anesthetic plan and risks discussed with patient. Plan discussed with CRNA.                     Velma Dickey MD   12/19/2022

## 2022-12-19 NOTE — OP NOTE
Operative Note      Patient: Nimco Navarro  YOB: 1970  MRN: [id number]    Date of Procedure: 12/19/2022  PROCEDURE NOTE    DATE OF PROCEDURE: 12/19/2022    SURGEON: Joanna Murillo MD    ASSISTANT: None    PREOPERATIVE DIAGNOSIS: Rectal pain / history of abscess    POSTOPERATIVE DIAGNOSIS: Grade 1 internal hemorrhoid. Colonoscopy otherwise grossly unremarkable. OPERATION: Total colonoscopy to cecum    ANESTHESIA: General    ESTIMATED BLOOD LOSS: None    COMPLICATIONS: None     SPECIMENS:  Was Not Obtained    HISTORY: The patient is a 46y.o. year old male with history of above preop diagnosis. I recommended colonoscopy with possible biopsy or polypectomy and I explained the risk, benefits, expected outcome, and alternatives to the procedure. Risks included but are not limited to bleeding, infection, respiratory distress, hypotension, and perforation of the colon and possibility of missing a lesion. The patient understands and is in agreement. PROCEDURE: The patient was given IV conscious sedation. The patient's SPO2 remained above 90% throughout the procedure. Digital rectal exam was normal.  The colonoscope was inserted through the anus into the rectum and advanced under direct vision to the cecum without difficulty. Terminal ileum was examined for approximately 2 inches. The prep was good. Findings:    Cecum/Ascending colon: normal    Transverse colon: normal    Descending/Sigmoid colon: normal    Rectum/Anus: examined in normal and retroflexed positions and was abnormal: Grade 1 internal hemorrhoid    Withdrawal Time was (minutes): 18      Next screening colonoscopy: 10 years. The colon was decompressed. While withdrawing the scope the above findings were verified and the scope was removed. The patient tolerated the procedure and conscious sedation without unusual events. In the recovery room patient was examined and remains hemodynamically stable.   Discharge home when criteria met.     Recommendations/Plan:   Discussed with the family  High fiber diet   Precautions to avoid constipation    Electronically signed by Jaycee Cason MD  on 12/19/2022 at 7:40 AM

## 2022-12-19 NOTE — H&P
HISTORY and [facility] [address]       NAME:  Kylie Bond  MRN: [id number]   YOB: 1970   Date: 12/19/2022   Age: 46 y.o. Gender: male       COMPLAINT AND PRESENT HISTORY:     Kylie Bond is 46 y.o. male, having a colonoscopy. Pt has had previous colonoscopy last in August, 2022. History of rectal abscess. Denies history of colon polyps. History of GERD. Denies abdominal pain, heartburn, nausea, vomiting, diarrhea, constipation, hematochezia, melena. Denies changes in appetite or unintended weight loss. Denies Family Hx of colon cancer. Completed and followed prescribed prep. NPO p MN. No medications taken this am. Denies recent or current blood thinning medications. Denies chest pain/pressure, palpitations, SOB, recent URI, fever or chills.      PAST MEDICAL HISTORY     Past Medical History:   Diagnosis Date    GERD (gastroesophageal reflux disease)     not treated    Hyperlipidemia        SURGICAL HISTORY       Past Surgical History:   Procedure Laterality Date    ABDOMEN SURGERY      laporoscopy  for acid reflux    BACK SURGERY      x3    COLONOSCOPY N/A 08/26/2022    COLONOSCOPY DIAGNOSTIC WITH EUA performed by Ingrid Ramos MD at [address] [place] [address] N/A 12/19/2013    post cervical laminectomy C5-6 C 6-7    RECTAL SURGERY  08/26/2022    RECTAL PERIRECTAL INCISION AND DRAINAGE performed by Ingrid Ramos MD at [place] [address] EXTRACTION         FAMILY HISTORY       Family History   Problem Relation Age of Onset    Diabetes Mother     Cancer Father        SOCIAL HISTORY       Social History     Socioeconomic History    Marital status:    Tobacco Use    Smoking status: Former     Packs/day: 1.50     Years: 20.00     Pack years: 30.00     Types: Cigarettes     Quit date: 2012     Years since quitting: 10.9    Smokeless tobacco: Never    Tobacco comments:     down to three cigarettes per day   Substance and Sexual Activity    Alcohol use: Yes     Alcohol/week: 5.0 standard drinks     Types: 5 Cans of beer per week     Comment: daily    Drug use: Not Currently        REVIEW OF SYSTEMS      No Known Allergies    No current facility-administered medications on file prior to encounter. Current Outpatient Medications on File Prior to Encounter   Medication Sig Dispense Refill    atorvastatin (LIPITOR) 40 MG tablet Take 40 mg by mouth daily      [DISCONTINUED] naproxen (NAPROSYN) 500 MG tablet Take 1 tablet by mouth 2 times daily (with meals). Ok to resume on 12/27 (Patient not taking: No sig reported) 60 tablet 0    [DISCONTINUED] varenicline (CHANTIX) 1 MG tablet Take 1 mg by mouth 2 times daily. (Patient not taking: Reported on 8/23/2022)     Notation: Above medications are not currently reconciled at time of signing this H&P note, to be reconciled in pre-op per RN. Review of Systems   Constitutional:  Negative for appetite change, chills, fever and unexpected weight change. HENT:  Negative for congestion, dental problem, hearing loss and sore throat. Eyes:  Negative for visual disturbance. Respiratory:  Negative for cough, shortness of breath and wheezing. Cardiovascular:  Negative for chest pain, palpitations and leg swelling. Gastrointestinal:         See HPI   Genitourinary: Negative. Musculoskeletal:  Negative for back pain and neck pain. Skin:  Negative for rash and wound. Neurological:  Negative for dizziness, speech difficulty, light-headedness and headaches. Hematological:  Does not bruise/bleed easily. Psychiatric/Behavioral: Negative. GENERAL PHYSICAL EXAM     Vitals: Review vitals per RN flowsheet. Physical Exam  Constitutional:       General: He is not in acute distress. Appearance: He is well-developed. He is obese. He is not ill-appearing. HENT:      Head: Normocephalic and atraumatic.       Nose: Nose normal.      Mouth/Throat: Mouth: Mucous membranes are moist.      Pharynx: Oropharynx is clear. No oropharyngeal exudate or posterior oropharyngeal erythema. Eyes:      General: No scleral icterus. Right eye: No discharge. Left eye: No discharge. Pupils: Pupils are equal, round, and reactive to light. Neck:      Trachea: No tracheal deviation. Cardiovascular:      Rate and Rhythm: Normal rate and regular rhythm. Heart sounds: Normal heart sounds. No murmur heard. No friction rub. No gallop. Pulmonary:      Effort: Pulmonary effort is normal. No respiratory distress. Breath sounds: Normal breath sounds. No wheezing, rhonchi or rales. Abdominal:      General: Bowel sounds are normal. There is no distension. Palpations: Abdomen is soft. Tenderness: There is no abdominal tenderness. There is no guarding. Musculoskeletal:         General: No tenderness. Cervical back: Neck supple. Right lower leg: No edema. Left lower leg: No edema. Skin:     General: Skin is warm and dry. Coloration: Skin is not jaundiced. Findings: No bruising, erythema or rash. Neurological:      General: No focal deficit present. Mental Status: He is alert and oriented to person, place, and time. Cranial Nerves: No cranial nerve deficit.       Gait: Gait normal.   Psychiatric:         Mood and Affect: Mood normal.      PROVISIONAL DIAGNOSES / SURGERY:      COLORECTAL CANCER SCREENING, NOT HIGH RISK    Colon cancer screening [Z12.11]    Patient Active Problem List    Diagnosis Date Noted    Proctitis 08/24/2022    Herniated cervical disc without myelopathy 12/19/2013           Derwood Bamberger, APRN - CNP on 12/19/2022 at 5:44 AM

## 2023-02-14 ENCOUNTER — HOSPITAL ENCOUNTER (OUTPATIENT)
Age: 53
Setting detail: SPECIMEN
Discharge: HOME | End: 2023-02-14

## 2023-02-15 ENCOUNTER — HOSPITAL ENCOUNTER (OUTPATIENT)
Age: 53
Setting detail: OBSERVATION
Discharge: HOME | End: 2023-02-17
Attending: SURGERY | Admitting: SURGERY
Payer: COMMERCIAL

## 2023-02-15 DIAGNOSIS — L02.212 BACK ABSCESS: ICD-10-CM

## 2023-02-15 DIAGNOSIS — L03.312 CELLULITIS OF LOWER BACK: Primary | ICD-10-CM

## 2023-02-15 LAB
ABSOLUTE EOS #: 0.2 K/UL (ref 0–0.4)
ABSOLUTE LYMPH #: 1.7 K/UL (ref 1–4.8)
ABSOLUTE MONO #: 0.5 K/UL (ref 0.1–1.3)
ANION GAP SERPL CALCULATED.3IONS-SCNC: 8 MMOL/L (ref 9–17)
BASOPHILS # BLD: 1 % (ref 0–2)
BASOPHILS ABSOLUTE: 0.1 K/UL (ref 0–0.2)
BUN SERPL-MCNC: 15 MG/DL (ref 6–20)
CALCIUM SERPL-MCNC: 9.3 MG/DL (ref 8.6–10.4)
CHLORIDE SERPL-SCNC: 106 MMOL/L (ref 98–107)
CO2 SERPL-SCNC: 29 MMOL/L (ref 20–31)
CREAT SERPL-MCNC: 0.82 MG/DL (ref 0.7–1.2)
EOSINOPHILS RELATIVE PERCENT: 3 % (ref 0–4)
GFR SERPL CREATININE-BSD FRML MDRD: >60 ML/MIN/1.73M2
GLUCOSE SERPL-MCNC: 134 MG/DL (ref 70–99)
HCT VFR BLD AUTO: 42.6 % (ref 41–53)
HGB BLD-MCNC: 14.3 G/DL (ref 13.5–17.5)
LYMPHOCYTES # BLD: 28 % (ref 24–44)
MCH RBC QN AUTO: 31.3 PG (ref 26–34)
MCHC RBC AUTO-ENTMCNC: 33.7 G/DL (ref 31–37)
MCV RBC AUTO: 92.9 FL (ref 80–100)
MICROORGANISM SPEC CULT: NO GROWTH
MICROORGANISM/AGENT SPEC: ABNORMAL
MICROORGANISM/AGENT SPEC: ABNORMAL
MONOCYTES # BLD: 9 % (ref 1–7)
PDW BLD-RTO: 13 % (ref 11.5–14.9)
PLATELET # BLD AUTO: 292 K/UL (ref 150–450)
PMV BLD AUTO: 7.7 FL (ref 6–12)
POTASSIUM SERPL-SCNC: 4.7 MMOL/L (ref 3.7–5.3)
RBC # BLD: 4.58 M/UL (ref 4.5–5.9)
SEG NEUTROPHILS: 59 % (ref 36–66)
SEGMENTED NEUTROPHILS ABSOLUTE COUNT: 3.6 K/UL (ref 1.3–9.1)
SODIUM SERPL-SCNC: 143 MMOL/L (ref 135–144)
SPECIMEN DESCRIPTION: ABNORMAL
WBC # BLD AUTO: 6.1 K/UL (ref 3.5–11)

## 2023-02-15 PROCEDURE — A4216 STERILE WATER/SALINE, 10 ML: HCPCS | Performed by: SURGERY

## 2023-02-15 PROCEDURE — 2500000003 HC RX 250 WO HCPCS: Performed by: SURGERY

## 2023-02-15 PROCEDURE — 85025 COMPLETE CBC W/AUTO DIFF WBC: CPT

## 2023-02-15 PROCEDURE — 2580000003 HC RX 258: Performed by: SURGERY

## 2023-02-15 PROCEDURE — 96375 TX/PRO/DX INJ NEW DRUG ADDON: CPT

## 2023-02-15 PROCEDURE — 6360000002 HC RX W HCPCS: Performed by: SURGERY

## 2023-02-15 PROCEDURE — 36415 COLL VENOUS BLD VENIPUNCTURE: CPT

## 2023-02-15 PROCEDURE — 80048 BASIC METABOLIC PNL TOTAL CA: CPT

## 2023-02-15 PROCEDURE — 96366 THER/PROPH/DIAG IV INF ADDON: CPT

## 2023-02-15 PROCEDURE — G0378 HOSPITAL OBSERVATION PER HR: HCPCS

## 2023-02-15 PROCEDURE — 96365 THER/PROPH/DIAG IV INF INIT: CPT

## 2023-02-15 PROCEDURE — G0379 DIRECT REFER HOSPITAL OBSERV: HCPCS

## 2023-02-15 RX ORDER — ATORVASTATIN CALCIUM 40 MG/1
40 TABLET, FILM COATED ORAL NIGHTLY
Status: DISCONTINUED | OUTPATIENT
Start: 2023-02-16 | End: 2023-02-17 | Stop reason: HOSPADM

## 2023-02-15 RX ORDER — ACETAMINOPHEN 325 MG/1
650 TABLET ORAL EVERY 4 HOURS PRN
Status: DISCONTINUED | OUTPATIENT
Start: 2023-02-15 | End: 2023-02-17 | Stop reason: HOSPADM

## 2023-02-15 RX ORDER — OXYCODONE HYDROCHLORIDE AND ACETAMINOPHEN 5; 325 MG/1; MG/1
1 TABLET ORAL EVERY 4 HOURS PRN
Status: DISCONTINUED | OUTPATIENT
Start: 2023-02-15 | End: 2023-02-17 | Stop reason: HOSPADM

## 2023-02-15 RX ORDER — FENTANYL CITRATE 0.05 MG/ML
50 INJECTION, SOLUTION INTRAMUSCULAR; INTRAVENOUS
Status: DISCONTINUED | OUTPATIENT
Start: 2023-02-15 | End: 2023-02-17 | Stop reason: HOSPADM

## 2023-02-15 RX ORDER — ONDANSETRON 2 MG/ML
4 INJECTION INTRAMUSCULAR; INTRAVENOUS EVERY 6 HOURS PRN
Status: DISCONTINUED | OUTPATIENT
Start: 2023-02-15 | End: 2023-02-17 | Stop reason: HOSPADM

## 2023-02-15 RX ORDER — FENTANYL CITRATE 50 UG/ML
100 INJECTION, SOLUTION INTRAMUSCULAR; INTRAVENOUS
Status: DISCONTINUED | OUTPATIENT
Start: 2023-02-15 | End: 2023-02-17 | Stop reason: HOSPADM

## 2023-02-15 RX ORDER — SODIUM CHLORIDE 9 MG/ML
INJECTION, SOLUTION INTRAVENOUS CONTINUOUS
Status: DISCONTINUED | OUTPATIENT
Start: 2023-02-15 | End: 2023-02-17 | Stop reason: HOSPADM

## 2023-02-15 RX ADMIN — VANCOMYCIN HYDROCHLORIDE 2500 MG: 1.5 INJECTION, POWDER, LYOPHILIZED, FOR SOLUTION INTRAVENOUS at 20:09

## 2023-02-15 RX ADMIN — FAMOTIDINE 20 MG: 10 INJECTION, SOLUTION INTRAVENOUS at 22:58

## 2023-02-15 RX ADMIN — SODIUM CHLORIDE: 9 INJECTION, SOLUTION INTRAVENOUS at 18:38

## 2023-02-15 NOTE — H&P
General Surgery Consult      Pt Name: Rd Cohen  MRN: [id number]  YOB: 1970  Date of evaluation: 2/15/2023  Primary Care Physician: Kt Castro DO   Patient evaluated at the request of  Dr. Reanna Garcia  Reason for evaluation: Lower back lump with redness and pain    SUBJECTIVE:   History of Chief Complaint:    Rd Cohen is a 46 y.o. male who presents with lower back lump with redness and pain. Minimal drainage. Redness is gotten progressively worse along with pain. Patient was seen by primary care physician yesterday was sent to my office today for evaluation. Patient is otherwise healthy and active. No history of diabetes. No blood thinners. Patient was just placed on oral antibiotics yesterday. Symptom onset has been acute for a time period of few day(s). Severity is described as mild-moderate. Course of his symptoms over time is acute. Past Medical History   has a past medical history of GERD (gastroesophageal reflux disease) and Hyperlipidemia. Past Surgical History   has a past surgical history that includes back surgery; other surgical history (N/A, 12/19/2013); Vasectomy; Abdomen surgery; Colonoscopy (N/A, 08/26/2022); Rectal surgery (08/26/2022); Tonsillectomy; Purdy tooth extraction; and Colonoscopy (N/A, 12/19/2022). Medications  Prior to Admission medications    Medication Sig Start Date End Date Taking? Authorizing Provider   Multiple Vitamins-Minerals (THERAPEUTIC MULTIVITAMIN-MINERALS) tablet Take 1 tablet by mouth daily    Historical Provider, MD   atorvastatin (LIPITOR) 40 MG tablet Take 40 mg by mouth daily    Historical Provider, MD   naproxen (NAPROSYN) 500 MG tablet Take 1 tablet by mouth 2 times daily (with meals). Ok to resume on 12/27  Patient not taking: No sig reported 12/20/13 8/28/22  Aria Menjivar MD   varenicline (CHANTIX) 1 MG tablet Take 1 mg by mouth 2 times daily.   Patient not taking: Reported on 8/23/2022 8/28/22  Historical Provider, MD     Allergies  is allergic to seasonal.    Family History  family history includes Cancer in his father; Diabetes in his mother. Social History   reports that he quit smoking about 11 years ago. His smoking use included cigarettes. He has a 30.00 pack-year smoking history. He has never used smokeless tobacco. He reports current alcohol use of about 5.0 standard drinks per week. He reports that he does not currently use drugs. Review of Systems:  General Denies any fever or chills  HEENT Denies any diplopia, tinnitus or vertigo  Resp Denies any shortness of breath, cough or wheezing  Cardiac Denies any chest pain, palpitations, claudication or edema  GI Denies any melena, hematochezia, hematemesis or pyrosis   Denies any frequency, urgency, hesitancy or incontinence  Heme Denies bruising or bleeding easily  Endocrine Denies any history of diabetes or thyroid disease  Neuro Denies any focal motor or sensory deficits    OBJECTIVE:   VITALS:  vitals were not taken for this visit. CONSTITUTIONAL: Alert and oriented times 3, no acute distress and cooperative to examination with proper mood and affect. SKIN:   Patient has infected epidermal inclusion cyst/abscess with surrounding cellulitis on the lower back. LYMPH: no cervical nodes, no inguinal nodes  HEENT: Head is normocephalic, atraumatic. EOMI, PERRLA  NECK: Supple, symmetrical, trachea midline, no adenopathy, thyroid symmetric, not enlarged and no tenderness, skin normal  CHEST/LUNGS: chest symmetric with normal A/P diameter, normal respiratory rate and rhythm, lungs clear to auscultation without wheezes, rales or rhonchi. No accessory muscle use. Scars None   CARDIOVASCULAR: Heart regular rate and rhythm Normal S1 and S2. . Carotid and femoral pulses 2+/4 and equal bilaterally  ABDOMEN: Soft abdomen nondistended nontender   RECTAL: deferred, not clinically indicated  NEUROLOGIC: There are no focalizing motor or sensory deficits.  CN II-XII are grossly intact. EXTREMITIES: no cyanosis, no clubbing, and no edema    LABS:   CBC with Differential:    Lab Results   Component Value Date/Time    WBC 6.1 02/15/2023 04:57 PM    RBC 4.58 02/15/2023 04:57 PM    HGB 14.3 02/15/2023 04:57 PM    HCT 42.6 02/15/2023 04:57 PM     02/15/2023 04:57 PM    MCV 92.9 02/15/2023 04:57 PM    MCH 31.3 02/15/2023 04:57 PM    MCHC 33.7 02/15/2023 04:57 PM    RDW 13.0 02/15/2023 04:57 PM    LYMPHOPCT 28 02/15/2023 04:57 PM    MONOPCT 9 02/15/2023 04:57 PM    BASOPCT 1 02/15/2023 04:57 PM    MONOSABS 0.50 02/15/2023 04:57 PM    LYMPHSABS 1.70 02/15/2023 04:57 PM    EOSABS 0.20 02/15/2023 04:57 PM    BASOSABS 0.10 02/15/2023 04:57 PM    DIFFTYPE NOT REPORTED 05/07/2021 03:10 PM     BMP:    Lab Results   Component Value Date/Time     02/15/2023 04:57 PM    K 4.7 02/15/2023 04:57 PM     02/15/2023 04:57 PM    CO2 29 02/15/2023 04:57 PM    BUN 15 02/15/2023 04:57 PM    LABALBU 4.2 08/24/2022 01:05 AM    CREATININE 0.82 02/15/2023 04:57 PM    CALCIUM 9.3 02/15/2023 04:57 PM    GFRAA >60 08/28/2022 06:03 AM    LABGLOM >60 02/15/2023 04:57 PM    GLUCOSE 134 02/15/2023 04:57 PM     Hepatic Function Panel:    Lab Results   Component Value Date/Time    ALKPHOS 110 08/24/2022 01:05 AM    ALT 23 08/24/2022 01:05 AM    AST 20 08/24/2022 01:05 AM    PROT 7.4 08/24/2022 01:05 AM    BILITOT 0.69 08/24/2022 01:05 AM    LABALBU 4.2 08/24/2022 01:05 AM     Calcium:    Lab Results   Component Value Date/Time    CALCIUM 9.3 02/15/2023 04:57 PM     Magnesium:    Lab Results   Component Value Date/Time    MG 2.0 08/24/2022 01:05 AM     Phosphorus:  No results found for: PHOS  PT/INR:    Lab Results   Component Value Date/Time    PROTIME 10.4 12/13/2013 10:30 AM    INR 1.0 12/13/2013 10:30 AM     ABG:  No results found for: PHART, PH, UJE0LNL, PCO2, PO2ART, PO2, AFS7YMN, HCO3, BEART, BE, THGBART, THB, OBR5KVG, J6TOGCXV, O2SAT  Urine Culture:  No components found for: DIRKINE  Blood Culture:   No components found for: CBLOOD, CFUNGUSBL  Stool Culture:  No components found for: CSTOOL    RADIOLOGY:   I have personally reviewed the following films:  No results found. IMPRESSION:   Infected epidermal inclusion cyst lower back/abscess with cellulitis    does not have any pertinent problems on file. PLAN:   Admission to the hospital.  IV antibiotics. N.p.o. after midnight. Check blood work. Family practice consult. For possible incision and drainage under anesthesia tomorrow. Discussed with the patient. Wife updated. Postoperative care discussed at length as well. All questions answered. Orders placed in the computer. Thank you for this interesting consult and for allowing us to participate in the care of this patient. If you have any questions please don't hesitate to call.           Electronically signed by Tonia Colon MD  on 2/15/2023 at 5:52 PM

## 2023-02-15 NOTE — PROGRESS NOTES
[address] [facility] Pharmacokinetic Monitoring Service - Vancomycin     Brittni Cook is a 46 y.o. male starting on vancomycin therapy for cellulitis. Pharmacy consulted by Dr. Veronique Banks for monitoring and adjustment. Target Concentration: Goal trough of 10-15 mg/L and AUC/WILBERTO <500 mg*hr/L    Additional Antimicrobials:     Pertinent Laboratory Values: Wt Readings from Last 1 Encounters:   12/19/22 245 lb (111.1 kg)     Temp Readings from Last 1 Encounters:   12/19/22 97.9 °F (36.6 °C) (Temporal)     CrCl cannot be calculated (Unknown ideal weight. ). Recent Labs     02/15/23  1657   CREATININE 0.82   WBC 6.1     Procalcitonin:     Pertinent Cultures:  Culture Date Source Results        MRSA Nasal Swab: N/A. Non-respiratory infection.     Plan:  Dosing recommendations based on Bayesian software  Start vancomycin 1250mg q12  Anticipated AUC of 451 and trough concentration of 14.4 at steady state  Renal labs as indicated   Pharmacy will continue to monitor patient and adjust therapy as indicated    Thank you for the consult,  Brain Chen, [facility]  2/15/2023 5:53 PM

## 2023-02-16 ENCOUNTER — ANESTHESIA EVENT (OUTPATIENT)
Dept: OPERATING ROOM | Age: 53
End: 2023-02-16
Payer: COMMERCIAL

## 2023-02-16 ENCOUNTER — ANESTHESIA (OUTPATIENT)
Dept: OPERATING ROOM | Age: 53
End: 2023-02-16
Payer: COMMERCIAL

## 2023-02-16 LAB
ABSOLUTE EOS #: 0.2 K/UL (ref 0–0.4)
ABSOLUTE LYMPH #: 1.9 K/UL (ref 1–4.8)
ABSOLUTE MONO #: 0.6 K/UL (ref 0.1–1.3)
ANION GAP SERPL CALCULATED.3IONS-SCNC: 6 MMOL/L (ref 9–17)
BASOPHILS # BLD: 1 % (ref 0–2)
BASOPHILS ABSOLUTE: 0 K/UL (ref 0–0.2)
BUN SERPL-MCNC: 12 MG/DL (ref 6–20)
CALCIUM SERPL-MCNC: 8.7 MG/DL (ref 8.6–10.4)
CHLORIDE SERPL-SCNC: 104 MMOL/L (ref 98–107)
CO2 SERPL-SCNC: 30 MMOL/L (ref 20–31)
CREAT SERPL-MCNC: 0.75 MG/DL (ref 0.7–1.2)
EOSINOPHILS RELATIVE PERCENT: 3 % (ref 0–4)
GFR SERPL CREATININE-BSD FRML MDRD: >60 ML/MIN/1.73M2
GLUCOSE SERPL-MCNC: 123 MG/DL (ref 70–99)
HCT VFR BLD AUTO: 39.6 % (ref 41–53)
HGB BLD-MCNC: 13.3 G/DL (ref 13.5–17.5)
LYMPHOCYTES # BLD: 31 % (ref 24–44)
MCH RBC QN AUTO: 31.2 PG (ref 26–34)
MCHC RBC AUTO-ENTMCNC: 33.6 G/DL (ref 31–37)
MCV RBC AUTO: 92.8 FL (ref 80–100)
MONOCYTES # BLD: 9 % (ref 1–7)
PDW BLD-RTO: 12.7 % (ref 11.5–14.9)
PLATELET # BLD AUTO: 270 K/UL (ref 150–450)
PMV BLD AUTO: 7.4 FL (ref 6–12)
POTASSIUM SERPL-SCNC: 5 MMOL/L (ref 3.7–5.3)
RBC # BLD: 4.27 M/UL (ref 4.5–5.9)
SEG NEUTROPHILS: 56 % (ref 36–66)
SEGMENTED NEUTROPHILS ABSOLUTE COUNT: 3.5 K/UL (ref 1.3–9.1)
SODIUM SERPL-SCNC: 140 MMOL/L (ref 135–144)
WBC # BLD AUTO: 6.2 K/UL (ref 3.5–11)

## 2023-02-16 PROCEDURE — 3700000001 HC ADD 15 MINUTES (ANESTHESIA): Performed by: SURGERY

## 2023-02-16 PROCEDURE — 3700000000 HC ANESTHESIA ATTENDED CARE: Performed by: SURGERY

## 2023-02-16 PROCEDURE — 7100000001 HC PACU RECOVERY - ADDTL 15 MIN: Performed by: SURGERY

## 2023-02-16 PROCEDURE — A4216 STERILE WATER/SALINE, 10 ML: HCPCS | Performed by: SURGERY

## 2023-02-16 PROCEDURE — G0378 HOSPITAL OBSERVATION PER HR: HCPCS

## 2023-02-16 PROCEDURE — 7100000000 HC PACU RECOVERY - FIRST 15 MIN: Performed by: SURGERY

## 2023-02-16 PROCEDURE — 6360000002 HC RX W HCPCS: Performed by: NURSE ANESTHETIST, CERTIFIED REGISTERED

## 2023-02-16 PROCEDURE — 80048 BASIC METABOLIC PNL TOTAL CA: CPT

## 2023-02-16 PROCEDURE — 3600000002 HC SURGERY LEVEL 2 BASE: Performed by: SURGERY

## 2023-02-16 PROCEDURE — 87070 CULTURE OTHR SPECIMN AEROBIC: CPT

## 2023-02-16 PROCEDURE — 2500000003 HC RX 250 WO HCPCS: Performed by: NURSE ANESTHETIST, CERTIFIED REGISTERED

## 2023-02-16 PROCEDURE — 2580000003 HC RX 258: Performed by: SURGERY

## 2023-02-16 PROCEDURE — 6360000002 HC RX W HCPCS: Performed by: ANESTHESIOLOGY

## 2023-02-16 PROCEDURE — 6360000002 HC RX W HCPCS: Performed by: SURGERY

## 2023-02-16 PROCEDURE — 87075 CULTR BACTERIA EXCEPT BLOOD: CPT

## 2023-02-16 PROCEDURE — 36415 COLL VENOUS BLD VENIPUNCTURE: CPT

## 2023-02-16 PROCEDURE — 3600000012 HC SURGERY LEVEL 2 ADDTL 15MIN: Performed by: SURGERY

## 2023-02-16 PROCEDURE — 2500000003 HC RX 250 WO HCPCS: Performed by: SURGERY

## 2023-02-16 PROCEDURE — 85025 COMPLETE CBC W/AUTO DIFF WBC: CPT

## 2023-02-16 PROCEDURE — 2709999900 HC NON-CHARGEABLE SUPPLY: Performed by: SURGERY

## 2023-02-16 PROCEDURE — 87205 SMEAR GRAM STAIN: CPT

## 2023-02-16 PROCEDURE — 6370000000 HC RX 637 (ALT 250 FOR IP): Performed by: SURGERY

## 2023-02-16 RX ORDER — BUPIVACAINE HYDROCHLORIDE 5 MG/ML
INJECTION, SOLUTION EPIDURAL; INTRACAUDAL PRN
Status: DISCONTINUED | OUTPATIENT
Start: 2023-02-16 | End: 2023-02-16 | Stop reason: ALTCHOICE

## 2023-02-16 RX ORDER — HYDRALAZINE HYDROCHLORIDE 20 MG/ML
10 INJECTION INTRAMUSCULAR; INTRAVENOUS
Status: DISCONTINUED | OUTPATIENT
Start: 2023-02-16 | End: 2023-02-16 | Stop reason: HOSPADM

## 2023-02-16 RX ORDER — MIDAZOLAM HYDROCHLORIDE 1 MG/ML
INJECTION INTRAMUSCULAR; INTRAVENOUS PRN
Status: DISCONTINUED | OUTPATIENT
Start: 2023-02-16 | End: 2023-02-16 | Stop reason: SDUPTHER

## 2023-02-16 RX ORDER — FENTANYL CITRATE 50 UG/ML
INJECTION, SOLUTION INTRAMUSCULAR; INTRAVENOUS PRN
Status: DISCONTINUED | OUTPATIENT
Start: 2023-02-16 | End: 2023-02-16 | Stop reason: SDUPTHER

## 2023-02-16 RX ORDER — SODIUM CHLORIDE 9 MG/ML
INJECTION, SOLUTION INTRAVENOUS PRN
Status: DISCONTINUED | OUTPATIENT
Start: 2023-02-16 | End: 2023-02-16 | Stop reason: HOSPADM

## 2023-02-16 RX ORDER — MEPERIDINE HYDROCHLORIDE 25 MG/ML
12.5 INJECTION INTRAMUSCULAR; INTRAVENOUS; SUBCUTANEOUS EVERY 5 MIN PRN
Status: DISCONTINUED | OUTPATIENT
Start: 2023-02-16 | End: 2023-02-16 | Stop reason: HOSPADM

## 2023-02-16 RX ORDER — DIPHENHYDRAMINE HYDROCHLORIDE 50 MG/ML
12.5 INJECTION INTRAMUSCULAR; INTRAVENOUS
Status: DISCONTINUED | OUTPATIENT
Start: 2023-02-16 | End: 2023-02-16 | Stop reason: HOSPADM

## 2023-02-16 RX ORDER — ACETAMINOPHEN 325 MG/1
650 TABLET ORAL
Status: DISCONTINUED | OUTPATIENT
Start: 2023-02-16 | End: 2023-02-16 | Stop reason: HOSPADM

## 2023-02-16 RX ORDER — SODIUM CHLORIDE 0.9 % (FLUSH) 0.9 %
5-40 SYRINGE (ML) INJECTION PRN
Status: DISCONTINUED | OUTPATIENT
Start: 2023-02-16 | End: 2023-02-16 | Stop reason: HOSPADM

## 2023-02-16 RX ORDER — METOCLOPRAMIDE HYDROCHLORIDE 5 MG/ML
10 INJECTION INTRAMUSCULAR; INTRAVENOUS
Status: DISCONTINUED | OUTPATIENT
Start: 2023-02-16 | End: 2023-02-16 | Stop reason: HOSPADM

## 2023-02-16 RX ORDER — PROPOFOL 10 MG/ML
INJECTION, EMULSION INTRAVENOUS PRN
Status: DISCONTINUED | OUTPATIENT
Start: 2023-02-16 | End: 2023-02-16 | Stop reason: SDUPTHER

## 2023-02-16 RX ORDER — ONDANSETRON 2 MG/ML
INJECTION INTRAMUSCULAR; INTRAVENOUS PRN
Status: DISCONTINUED | OUTPATIENT
Start: 2023-02-16 | End: 2023-02-16 | Stop reason: SDUPTHER

## 2023-02-16 RX ORDER — LIDOCAINE HYDROCHLORIDE 10 MG/ML
INJECTION, SOLUTION EPIDURAL; INFILTRATION; INTRACAUDAL; PERINEURAL PRN
Status: DISCONTINUED | OUTPATIENT
Start: 2023-02-16 | End: 2023-02-16 | Stop reason: SDUPTHER

## 2023-02-16 RX ORDER — OXYCODONE HYDROCHLORIDE AND ACETAMINOPHEN 5; 325 MG/1; MG/1
1 TABLET ORAL EVERY 4 HOURS PRN
Status: DISCONTINUED | OUTPATIENT
Start: 2023-02-16 | End: 2023-02-17 | Stop reason: HOSPADM

## 2023-02-16 RX ORDER — ONDANSETRON 2 MG/ML
4 INJECTION INTRAMUSCULAR; INTRAVENOUS
Status: COMPLETED | OUTPATIENT
Start: 2023-02-16 | End: 2023-02-16

## 2023-02-16 RX ORDER — DEXAMETHASONE SODIUM PHOSPHATE 4 MG/ML
INJECTION, SOLUTION INTRA-ARTICULAR; INTRALESIONAL; INTRAMUSCULAR; INTRAVENOUS; SOFT TISSUE PRN
Status: DISCONTINUED | OUTPATIENT
Start: 2023-02-16 | End: 2023-02-16 | Stop reason: SDUPTHER

## 2023-02-16 RX ORDER — LABETALOL HYDROCHLORIDE 5 MG/ML
10 INJECTION, SOLUTION INTRAVENOUS
Status: DISCONTINUED | OUTPATIENT
Start: 2023-02-16 | End: 2023-02-16 | Stop reason: HOSPADM

## 2023-02-16 RX ORDER — SODIUM CHLORIDE 0.9 % (FLUSH) 0.9 %
5-40 SYRINGE (ML) INJECTION EVERY 12 HOURS SCHEDULED
Status: DISCONTINUED | OUTPATIENT
Start: 2023-02-16 | End: 2023-02-16 | Stop reason: HOSPADM

## 2023-02-16 RX ORDER — FENTANYL CITRATE 0.05 MG/ML
25 INJECTION, SOLUTION INTRAMUSCULAR; INTRAVENOUS EVERY 5 MIN PRN
Status: DISCONTINUED | OUTPATIENT
Start: 2023-02-16 | End: 2023-02-16 | Stop reason: HOSPADM

## 2023-02-16 RX ORDER — OXYCODONE HYDROCHLORIDE AND ACETAMINOPHEN 5; 325 MG/1; MG/1
1 TABLET ORAL EVERY 4 HOURS PRN
Status: CANCELLED | OUTPATIENT
Start: 2023-02-16

## 2023-02-16 RX ADMIN — OXYCODONE HYDROCHLORIDE AND ACETAMINOPHEN 1 TABLET: 5; 325 TABLET ORAL at 20:06

## 2023-02-16 RX ADMIN — PROPOFOL 50 MG: 10 INJECTION, EMULSION INTRAVENOUS at 13:34

## 2023-02-16 RX ADMIN — DEXAMETHASONE SODIUM PHOSPHATE 8 MG: 4 INJECTION, SOLUTION INTRAMUSCULAR; INTRAVENOUS at 13:34

## 2023-02-16 RX ADMIN — MIDAZOLAM 2 MG: 1 INJECTION INTRAMUSCULAR; INTRAVENOUS at 13:21

## 2023-02-16 RX ADMIN — PROPOFOL 50 MG: 10 INJECTION, EMULSION INTRAVENOUS at 13:47

## 2023-02-16 RX ADMIN — FENTANYL CITRATE 25 MCG: 50 INJECTION, SOLUTION INTRAMUSCULAR; INTRAVENOUS at 14:15

## 2023-02-16 RX ADMIN — FAMOTIDINE 20 MG: 10 INJECTION, SOLUTION INTRAVENOUS at 20:08

## 2023-02-16 RX ADMIN — SODIUM CHLORIDE: 9 INJECTION, SOLUTION INTRAVENOUS at 05:56

## 2023-02-16 RX ADMIN — ATORVASTATIN CALCIUM 40 MG: 40 TABLET, FILM COATED ORAL at 20:06

## 2023-02-16 RX ADMIN — SODIUM CHLORIDE: 9 INJECTION, SOLUTION INTRAVENOUS at 15:16

## 2023-02-16 RX ADMIN — LIDOCAINE HYDROCHLORIDE 50 MG: 10 INJECTION, SOLUTION EPIDURAL; INFILTRATION; INTRACAUDAL; PERINEURAL at 13:27

## 2023-02-16 RX ADMIN — OXYCODONE HYDROCHLORIDE AND ACETAMINOPHEN 1 TABLET: 5; 325 TABLET ORAL at 16:30

## 2023-02-16 RX ADMIN — PROPOFOL 50 MG: 10 INJECTION, EMULSION INTRAVENOUS at 13:43

## 2023-02-16 RX ADMIN — FENTANYL CITRATE 50 MCG: 50 INJECTION, SOLUTION INTRAMUSCULAR; INTRAVENOUS at 13:27

## 2023-02-16 RX ADMIN — PROPOFOL 50 MG: 10 INJECTION, EMULSION INTRAVENOUS at 13:27

## 2023-02-16 RX ADMIN — FENTANYL CITRATE 100 MCG: 50 INJECTION, SOLUTION INTRAMUSCULAR; INTRAVENOUS at 17:30

## 2023-02-16 RX ADMIN — VANCOMYCIN HYDROCHLORIDE 1250 MG: 1.5 INJECTION, POWDER, LYOPHILIZED, FOR SOLUTION INTRAVENOUS at 05:57

## 2023-02-16 RX ADMIN — PROPOFOL 50 MG: 10 INJECTION, EMULSION INTRAVENOUS at 13:30

## 2023-02-16 RX ADMIN — ONDANSETRON 4 MG: 2 INJECTION INTRAMUSCULAR; INTRAVENOUS at 14:17

## 2023-02-16 RX ADMIN — PROPOFOL 50 MG: 10 INJECTION, EMULSION INTRAVENOUS at 13:39

## 2023-02-16 RX ADMIN — FENTANYL CITRATE 50 MCG: 50 INJECTION, SOLUTION INTRAMUSCULAR; INTRAVENOUS at 13:33

## 2023-02-16 RX ADMIN — FAMOTIDINE 20 MG: 10 INJECTION, SOLUTION INTRAVENOUS at 09:03

## 2023-02-16 RX ADMIN — VANCOMYCIN HYDROCHLORIDE 1250 MG: 1.5 INJECTION, POWDER, LYOPHILIZED, FOR SOLUTION INTRAVENOUS at 17:39

## 2023-02-16 RX ADMIN — ONDANSETRON HYDROCHLORIDE 4 MG: 2 SOLUTION INTRAMUSCULAR; INTRAVENOUS at 13:47

## 2023-02-16 ASSESSMENT — PAIN DESCRIPTION - DESCRIPTORS
DESCRIPTORS: ACHING;BURNING
DESCRIPTORS: ACHING;BURNING

## 2023-02-16 ASSESSMENT — PAIN DESCRIPTION - LOCATION
LOCATION: BACK
LOCATION: BACK

## 2023-02-16 ASSESSMENT — ENCOUNTER SYMPTOMS
SHORTNESS OF BREATH: 0
STRIDOR: 0

## 2023-02-16 ASSESSMENT — LIFESTYLE VARIABLES: SMOKING_STATUS: 0

## 2023-02-16 ASSESSMENT — PAIN DESCRIPTION - ORIENTATION
ORIENTATION: LOWER
ORIENTATION: LOWER

## 2023-02-16 ASSESSMENT — PAIN SCALES - GENERAL
PAINLEVEL_OUTOF10: 7
PAINLEVEL_OUTOF10: 6
PAINLEVEL_OUTOF10: 10
PAINLEVEL_OUTOF10: 9

## 2023-02-16 ASSESSMENT — PAIN - FUNCTIONAL ASSESSMENT: PAIN_FUNCTIONAL_ASSESSMENT: 0-10

## 2023-02-16 NOTE — PLAN OF CARE
Problem: Discharge Planning  Goal: Discharge to home or other facility with appropriate resources  2/16/2023 0450 by Stu Workman RN  Outcome: Progressing  2/15/2023 1907 by Frantz Hercules RN  Outcome: Progressing     Problem: ABCDS Injury Assessment  Goal: Absence of physical injury  2/16/2023 0450 by Stu Workman RN  Outcome: Progressing  2/15/2023 1907 by Frantz Hercules RN  Outcome: Progressing     Problem: Safety - Adult  Goal: Free from fall injury  2/16/2023 0450 by Stu Workman RN  Outcome: Progressing  2/15/2023 1907 by Frantz Hercules RN  Outcome: Progressing

## 2023-02-16 NOTE — CARE COORDINATION
Case Management Assessment  Initial Evaluation    Date/Time of Evaluation: 2/16/2023 12:26 PM  Assessment Completed by: Jerson Betancourt RN    If patient is discharged prior to next notation, then this note serves as note for discharge by case management. Patient Name: Mady Ervin                   YOB: 1970  Diagnosis: Cellulitis of lower back [L03.312]                   Date / Time: 2/15/2023  4:09 PM    Patient Admission Status: Observation   Readmission Risk (Low < 19, Mod (19-27), High > 27): Readmission Risk Score: 8    Current PCP: Chan Richard, DO  PCP verified by CM? Yes    Chart Reviewed: Yes      History Provided by: Patient  Patient Orientation: Alert and Oriented    Patient Cognition: Alert    Hospitalization in the last 30 days (Readmission):  No    If yes, Readmission Assessment in CM Navigator will be completed. Advance Directives:      Code Status: Full Code   Patient's Primary Decision Maker is: Legal Next of Kin      Discharge Planning:    Patient lives with: Spouse/Significant Other Type of Home: House  Primary Care Giver: Self  Patient Support Systems include: Spouse/Significant Other   Current Financial resources:    Current community resources:    Current services prior to admission: None            Current DME:              Type of Home Care services:  None    ADLS  Prior functional level: Independent in ADLs/IADLs  Current functional level: Independent in ADLs/IADLs    PT AM-PAC:   /24  OT AM-PAC:   /24    Family can provide assistance at DC: Yes  Would you like Case Management to discuss the discharge plan with any other family members/significant others, and if so, who?  Yes (Wife, Julio Cesar Castano)  Plans to Return to Present Housing: Yes  Other Identified Issues/Barriers to RETURNING to current housing: yes  Potential Assistance needed at discharge: N/A            Potential DME:    Patient expects to discharge to: [address] [place] [address] for transportation at discharge: Self    Financial    Payor: UMR / Plan: UMR  / Product Type: *No Product type* /     Does insurance require precert for SNF: No    Potential assistance Purchasing Medications: No  Meds-to-Beds request:        [id number] - [place], [place] - [address] [phone] - F [phone]  [address] [address]  Phone: [phone] Fax: [phone]      Notes:    Factors facilitating achievement of predicted outcomes: Family support    Barriers to discharge: None    Additional Case Management Notes: 2/16/23 UMR Pt. Lives in 1 story home w/ Wife. No DME, I & D today, IV Vanco, Denies VNS, Wife can do dressing changes. Informed to let writer know if needs change. Glen would need signed/completed, if needed//KB    The Plan for Transition of Care is related to the following treatment goals of Cellulitis of lower back [L03.312]    IF APPLICABLE: The Patient and/or patient representative Gordon and his family were provided with a choice of provider and agrees with the discharge plan. Freedom of choice list with basic dialogue that supports the patient's individualized plan of care/goals and shares the quality data associated with the providers was provided to:     Patient Representative Name:       The Patient and/or Patient Representative Agree with the Discharge Plan?      Megan Sadler RN  Case Management Department  Ph: [phone] Fax: [phone]

## 2023-02-16 NOTE — BRIEF OP NOTE
Brief Postoperative Note      Patient: Philip Barnett  YOB: 1970  MRN: [id number]    Date of Procedure: 2/16/2023    Pre-Op Diagnosis: INFECTED CYST ON BACK    Post-Op Diagnosis: Same       Procedure(s):  INCISION AND DRAINAGE LARGE INFECTED CYST ON BACK    Surgeon(s):  Dawood Eckert MD    Assistant:  * No surgical staff found *    Anesthesia: General    Estimated Blood Loss (mL): less than 50     Complications: None    Specimens:   ID Type Source Tests Collected by Time Destination   1 : INFECTED BACK CYST SWAB FOR CULTURE Swab Back CULTURE, ANAEROBIC AND AEROBIC Dawood Eckert MD 2/16/2023 1342        Implants:  * No implants in log *      Drains: * No LDAs found *    Findings: dictated    Electronically signed by Dawood Eckert MD on 2/16/2023 at 1:49 PM

## 2023-02-16 NOTE — PROGRESS NOTES
Vancomycin Dosing by Pharmacy - Daily Note   Vancomycin Therapy Day:  2  Indication: infection of lower back    Allergies:  Seasonal   Actual Weight:    Wt Readings from Last 1 Encounters:   02/15/23 270 lb 4.5 oz (122.6 kg)       Labs/Ancillary Data  Estimated Creatinine Clearance: 158 mL/min (based on SCr of 0.75 mg/dL). Recent Labs     02/15/23  1657 02/16/23  0609   CREATININE 0.82 0.75   BUN 15 12   WBC 6.1 6.2     No results found for: PROCAL  No intake or output data in the 24 hours ending 02/16/23 0657  Temp: 98.2 F    Culture Date / Source  /  Results  See micro  Recent vancomycin administrations                     vancomycin (VANCOCIN) 1,250 mg in sodium chloride 0.9 % 250 mL IVPB (mg) 1,250 mg New Bag 02/16/23 0557    vancomycin (VANCOCIN) 2,500 mg in sodium chloride 0.9 % 500 mL IVPB (mg) 2,500 mg New Bag 02/15/23 2009                    Vancomycin Concentrations:   TROUGH:  No results for input(s): VANCOTROUGH in the last 72 hours. RANDOM:  No results for input(s): VANCORANDOM in the last 72 hours. MRSA Nasal Swab: N/A. Non-respiratory infection. Randell Young PLAN     Continue current dose of 1250 mg q12h IV  Ensured BUN/sCr ordered at baseline and every 48 hours x at least 3 levels, then at least weekly. Repeat vancomycin concentration ordered for 02/17 @ 1200   Pharmacy will continue to monitor patient and adjust therapy as indicated      Vancomycin Target Concentration Parameters  Treatment  Population Target AUC/WILBERTO Target Trough   Invasive MRSA Infection (bacteremia, pneumonia, meningitis, endocarditis, osteomyelitis)  Sepsis (undifferentiated) 400-600 N/A   Infection due to non-MRSA pathogen  Empiric treatment of non-invasive MRSA infection  (SSTI, UTI) <500 10-15 mg/L   CrCl < 29 mL/min  Rapidly fluctuating serum creatinine   LUCRETIA N/A < 15 mg/L     Renal replacement therapy is dosed by levels, per hospital protocol. Abbreviations  * Pauc: probability that AUC is >400 (efficacy);  Pconc: probability that Ctrough is above 20 ?g/mL (toxicity); Tox: Probability of nephrotoxicity, based on Krysta et al. Clin Infect Dis 2009. Loading dose: N/A  Regimen: 1250 mg IV every 12 hours. Start time: 13:57 on 02/16/2023  Exposure target: AUC24 (range)400-600 mg/L.hr   AUC24,ss: 465 mg/L.hr  Probability of AUC24 > 400: 65 %  Ctrough,ss: 14.9 mg/L  Probability of Ctrough,ss > 20: 27 %  Probability of nephrotoxicity (Krysta LUIS ANTONIO 2009): 10 %    Thank you for the consult. Pharmacy will continue to follow. Elmer Bedoya. Ph.  2/16/2023  6:58 AM

## 2023-02-16 NOTE — ANESTHESIA PRE PROCEDURE
Department of Anesthesiology  Preprocedure Note       Name:  Shakira Jackson   Age:  46 y.o.  :  1970                                          MRN:  [id number]         Date:  2023      Surgeon: Anibal Sanchez):  Latricia Nolasco MD    Procedure: Procedure(s):  INCISION AND DRAINAGE LARGE INFECTED CYST ON BACK    Medications prior to admission:   Prior to Admission medications    Medication Sig Start Date End Date Taking? Authorizing Provider   Multiple Vitamins-Minerals (THERAPEUTIC MULTIVITAMIN-MINERALS) tablet Take 1 tablet by mouth daily    Historical Provider, MD   atorvastatin (LIPITOR) 40 MG tablet Take 40 mg by mouth daily    Historical Provider, MD   naproxen (NAPROSYN) 500 MG tablet Take 1 tablet by mouth 2 times daily (with meals). Ok to resume on   Patient not taking: No sig reported 13  Los Pack MD   varenicline (CHANTIX) 1 MG tablet Take 1 mg by mouth 2 times daily.   Patient not taking: Reported on 2022  Historical Provider, MD       Current medications:    Current Facility-Administered Medications   Medication Dose Route Frequency Provider Last Rate Last Segundo Phillips [facility] Hold] 0.9 % sodium chloride infusion   IntraVENous Continuous Latricia Nolasco  mL/hr at 23 1221 NoRateChange at 23 1221    [MAR Hold] famotidine (PEPCID) 20 mg in sodium chloride (PF) 0.9 % 10 mL injection  20 mg IntraVENous BID Latricia Nolasco MD   20 mg at 23 0903    [MAR Hold] ondansetron (ZOFRAN) injection 4 mg  4 mg IntraVENous Q6H PRN Latricia Nolasco MD        [facility] Hold] fentaNYL (SUBLIMAZE) injection 50 mcg  50 mcg IntraVENous Q2H PRN Latricia Nolasco MD        [facility] Hold] fentaNYL (SUBLIMAZE) injection 100 mcg  100 mcg IntraVENous Q2H PRN Latricia Nolasco MD        [facility] Hold] oxyCODONE-acetaminophen (PERCOCET) 5-325 MG per tablet 1 tablet  1 tablet Oral Q4H PRN Latricia Nolasco MD        [facility] Hold] acetaminophen (TYLENOL) tablet 650 mg  650 mg Oral Q4H PRN Brittany Haro MD        [facility] Hold] vancomycin [facility]) intermittent dosing (placeholder)   Other RX Gaudencio Mckay MD        [facility] Hold] vancomycin (VANCOCIN) 1,250 mg in sodium chloride 0.9 % 250 mL IVPB  1,250 mg IntraVENous Q12H Brittany Haro MD   Stopped at 23 0727    [MAR Hold] atorvastatin (LIPITOR) tablet 40 mg  40 mg Oral Nightly Aaron T Hitesh, DO           Allergies: Allergies   Allergen Reactions    Seasonal      Other reaction(s): Unknown       Problem List:    Patient Active Problem List   Diagnosis Code    Herniated cervical disc without myelopathy M50.20    Proctitis 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.89    Cellulitis of lower back L03.312       Past Medical History:        Diagnosis Date    GERD (gastroesophageal reflux disease)     not treated    History of back surgery     , ,     Hx of neck surgery     Hyperlipidemia     Rectal abscess     2022       Past Surgical History:        Procedure Laterality Date    ABDOMEN SURGERY      laporoscopy  for acid reflux    BACK SURGERY      x3    COLONOSCOPY N/A 2022    COLONOSCOPY DIAGNOSTIC WITH EUA performed by Brittany Haro MD at [address] [facility] COLONOSCOPY N/A 2022    COLORECTAL CANCER SCREENING, NOT HIGH RISK performed by Brittany Haro MD at [address] [facility] N/A 2013    post cervical laminectomy C5-6 C 6-7    RECTAL SURGERY  2022    RECTAL PERIRECTAL INCISION AND DRAINAGE performed by Brittany Haro MD at [address] [place] [address]         Social History:    Social History     Tobacco Use    Smoking status: Former     Packs/day: 1.50     Years: 20.00     Pack years: 30.00     Types: Cigarettes     Quit date:      Years since quittin.1    Smokeless tobacco: Never    Tobacco comments:     down to three cigarettes per day   Substance Use Topics    Alcohol use:  Yes     Alcohol/week: 5.0 standard drinks     Types: 5 Cans of beer per week     Comment: daily                                Counseling given: Not Answered  Tobacco comments: down to three cigarettes per day      Vital Signs (Current):   Vitals:    02/15/23 1800 02/15/23 1912 02/16/23 0639 02/16/23 1224   BP:  (!) 148/79 117/70 124/75   Pulse:  66 51 (!) 49   Resp:  16 19 18   Temp:  97.9 °F (36.6 °C) 98.2 °F (36.8 °C) 97.5 °F (36.4 °C)   TempSrc:    Infrared   SpO2:  95% 97% 97%   Weight: 270 lb 4.5 oz (122.6 kg)      Height: 6' 1\" (1.854 m)                                                 BP Readings from Last 3 Encounters:   02/16/23 124/75   12/19/22 (!) 141/82   09/08/22 100/70       NPO Status: Time of last liquid consumption: 2345                        Time of last solid consumption: 2200                        Date of last liquid consumption: 02/15/23                        Date of last solid food consumption: 02/15/23    BMI:   Wt Readings from Last 3 Encounters:   02/15/23 270 lb 4.5 oz (122.6 kg)   12/19/22 245 lb (111.1 kg)   12/05/22 245 lb (111.1 kg)     Body mass index is 35.66 kg/m².     CBC:   Lab Results   Component Value Date/Time    WBC 6.2 02/16/2023 06:09 AM    RBC 4.27 02/16/2023 06:09 AM    HGB 13.3 02/16/2023 06:09 AM    HCT 39.6 02/16/2023 06:09 AM    MCV 92.8 02/16/2023 06:09 AM    RDW 12.7 02/16/2023 06:09 AM     02/16/2023 06:09 AM     K 5.0    CMP:   Lab Results   Component Value Date/Time     02/16/2023 06:09 AM    K 5.0 02/16/2023 06:09 AM     02/16/2023 06:09 AM    CO2 30 02/16/2023 06:09 AM    BUN 12 02/16/2023 06:09 AM    CREATININE 0.75 02/16/2023 06:09 AM    GFRAA >60 08/28/2022 06:03 AM    LABGLOM >60 02/16/2023 06:09 AM    GLUCOSE 123 02/16/2023 06:09 AM    PROT 7.4 08/24/2022 01:05 AM    CALCIUM 8.7 02/16/2023 06:09 AM    BILITOT 0.69 08/24/2022 01:05 AM    ALKPHOS 110 08/24/2022 01:05 AM    AST 20 08/24/2022 01:05 AM    ALT 23 08/24/2022 01:05 AM       POC Tests: No results for input(s): POCGLU, POCNA, POCK, POCCL, POCBUN, POCHEMO, POCHCT in the last 72 hours. Coags:   Lab Results   Component Value Date/Time    PROTIME 10.4 12/13/2013 10:30 AM    INR 1.0 12/13/2013 10:30 AM    APTT 25.5 12/13/2013 10:30 AM       HCG (If Applicable): No results found for: PREGTESTUR, PREGSERUM, HCG, HCGQUANT     ABGs: No results found for: PHART, PO2ART, LEU3EUK, NZR5MNI, BEART, H6VPYEMO     Type & Screen (If Applicable):  No results found for: LABABO, LABRH    Drug/Infectious Status (If Applicable):  No results found for: HIV, HEPCAB    COVID-19 Screening (If Applicable): No results found for: COVID19        Anesthesia Evaluation  Patient summary reviewed and Nursing notes reviewed no history of anesthetic complications:   Airway: Mallampati: III  TM distance: >3 FB   Neck ROM: full  Mouth opening: > = 3 FB   Dental: normal exam         Pulmonary:Negative Pulmonary ROS and normal exam  breath sounds clear to auscultation      (-) pneumonia, COPD, asthma, shortness of breath, recent URI, sleep apnea, rhonchi, wheezes, rales, stridor, not a current smoker and no decreased breath sounds          Patient did not smoke on day of surgery.                  Cardiovascular:Negative CV ROS  Exercise tolerance: good (>4 METS),       (-) pacemaker, hypertension, valvular problems/murmurs, past MI, CAD, CABG/stent, dysrhythmias,  angina,  CHF, orthopnea, PND,  ALCOCER, murmur, weak pulses,  friction rub, systolic click, carotid bruit,  JVD, peripheral edema, no pulmonary hypertension and no hyperlipidemia    ECG reviewed  Rhythm: regular  Rate: normal           Beta Blocker:  Not on Beta Blocker         Neuro/Psych:      (-) seizures, neuromuscular disease, TIA, CVA, headaches, psychiatric history and depression/anxiety            GI/Hepatic/Renal:   (+) GERD: no interval change,      (-) hiatal hernia, PUD, hepatitis, liver disease, no renal disease, bowel prep and no morbid obesity       Endo/Other: Negative Endo/Other ROS   (+) no malignancy/cancer. (-) diabetes mellitus, hypothyroidism, hyperthyroidism, blood dyscrasia, arthritis, no electrolyte abnormalities, no malignancy/cancer               Abdominal:             Vascular: negative vascular ROS. - PVD, DVT and PE. Other Findings:           Anesthesia Plan      general     ASA 2       Induction: intravenous. MIPS: Postoperative opioids intended and Prophylactic antiemetics administered. Anesthetic plan and risks discussed with patient. Plan discussed with CRNA.                     Vania Mak MD   2/16/2023

## 2023-02-16 NOTE — CONSULTS
Dr. [facility]  CHIEF COMPLAINT: No chief complaint on file. Reason for Admission: Back abscess  History Obtained From:  Patient     HISTORY OF PRESENT ILLNESS:      The patient is a pleasant 46 y.o. male with significant medical history of hyperlipidemia and GERD presented with with development of tach cyst and abscess and infection started about 2 weeks ago he says started very small and then got bigger got little bit tender he said that about a week ago he tried to push it with his finger see if he could just pop it open it did not work then he has his wife to use a needle to open it up she did not want to do that but his daughter tried she is got little bit of blood little bit of clear fluid he says. And by Friday started getting worse and getting more painful. He sees  and he referred him to see  To see he saw him yesterday and patient was admitted for an I&D to be done today. He had a little bit feverish last week but no chills. No nausea no vomiting he denies history of any chest pain or shortness of breath he is healthy otherwise except taking cholesterol meds he says.   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    Cellulitis of lower back L03.312       Past Surgical History:       Past Surgical History:   Procedure Laterality Date    ABDOMEN SURGERY      laporoscopy  for acid reflux    BACK SURGERY      x3    COLONOSCOPY N/A 08/26/2022    COLONOSCOPY DIAGNOSTIC WITH EUA performed by Yen Melton MD at [address] N/A 12/19/2022    COLORECTAL CANCER SCREENING, NOT HIGH RISK performed by Yen Melton MD at [facility] 2 N/A 12/19/2013    post cervical laminectomy C5-6 C 6-7    RECTAL SURGERY  08/26/2022    RECTAL PERIRECTAL INCISION AND DRAINAGE performed by Crescencio Hooper MD at [address]         Current Medications:    Current Facility-Administered Medications   Medication Dose Route Frequency Provider Last Rate Last Admin    0.9 % sodium chloride infusion   IntraVENous Continuous Crescencio Hooper  mL/hr at 02/16/23 0556 New Bag at 02/16/23 0556    famotidine (PEPCID) 20 mg in sodium chloride (PF) 0.9 % 10 mL injection  20 mg IntraVENous BID Crescencio Hooper MD   20 mg at 02/15/23 2258    ondansetron (ZOFRAN) injection 4 mg  4 mg IntraVENous Q6H PRN Crescencio Hooper MD        fentaNYL (SUBLIMAZE) injection 50 mcg  50 mcg IntraVENous Q2H PRN Crescencio Hooper MD        fentaNYL (SUBLIMAZE) injection 100 mcg  100 mcg IntraVENous Q2H PRN Crescencio Hooper MD        oxyCODONE-acetaminophen (PERCOCET) 5-325 MG per tablet 1 tablet  1 tablet Oral Q4H PRN Crescencio Hooper MD        acetaminophen (TYLENOL) tablet 650 mg  650 mg Oral Q4H PRN Crescencio Hooper MD        vancomycin (VANCOCIN) intermittent dosing (placeholder)   Other RX Lisa Sommer MD        vancomycin (VANCOCIN) 1,250 mg in sodium chloride 0.9 % 250 mL IVPB  1,250 mg IntraVENous Q12H Crescencio Hooper .7 mL/hr at 02/16/23 0557 1,250 mg at 02/16/23 0557    atorvastatin (LIPITOR) tablet 40 mg  40 mg Oral Nightly Aaron Proctor DO           Allergies:  Seasonal    Social History:    reports that he quit smoking about 11 years ago. His smoking use included cigarettes. He has a 30.00 pack-year smoking history. He has never used smokeless tobacco. He reports current alcohol use of about 5.0 standard drinks per week. He reports that he does not currently use drugs.     Family History:   Family History   Problem Relation Age of Onset    Diabetes Mother Cancer Father        REVIEW OF SYSTEMS:  RESPIRATORY:  negative for  dry cough, dyspnea, wheezing and chest pain positive for  neg  CARDIOVASCULAR:  negative for  chest pain, dyspnea, palpitations, orthopnea, exertional chest pressure/discomfort, fatigue, edema, syncope positive for negative  GASTROINTESTINAL:  negative for nausea, vomiting, change in bowel habits, diarrhea, constipation, abdominal pain and reflux positive for   GENITOURINARY:  negative for frequency, dysuria, nocturia, urinary incontinence and hesitancy positive for GERD  HEMATOLOGIC/LYMPHATIC:  negative for easy bruising, bleeding and swelling/edemapositive for   ENDOCRINE:  negative for weight changes, change in bowel habits and diabetic symptoms including neither polyuria nor polydipsia nor blurred vision nor foot ulcerations nor neuropathypositive for upper lipidemia  MUSCULOSKELETAL:  negative for  myalgias, arthralgias, pain, joint swelling, stiff joints and muscle weakness positive for back surgery  NEUROLOGICAL:  negative for headaches, dizziness, memory problems, speech problems, visual disturbance, gait problems, weakness and numbness positive for neg    Vitals:  /70   Pulse 51   Temp 98.2 °F (36.8 °C)   Resp 19   Ht 6' 1\" (1.854 m)   Wt 270 lb 4.5 oz (122.6 kg)   SpO2 97%   BMI 35.66 kg/m²     PHYSICAL EXAM:    CONSTITUTIONAL:  awake, alert, cooperative, no apparent distress, and appears stated age  EYES:  Lids and lashes normal, pupils equal, round and reactive to light, extra ocular muscles intact, sclera clear, conjunctiva normal    ENT:  Normocephalic, without obvious abnormality, atraumatic, sinuses nontender on palpation, external ears without lesions, oral pharynx with moist mucus membranes, tonsils without erythema or exudates,    NECK:  Supple, symmetrical, trachea midline, no adenopathy, thyroid symmetric, not enlarged and no tenderness, skin normal    HEMATOLOGIC/LYMPHATICS:  no cervical lymphadenopathy   BACK: Symmetric, no curvature, spinous processes are non-tender on palpation, paraspinous muscles are non-tender on palpation, no costal vertebral tenderness    LUNGS:  No increased work of breathing, good air exchange, clear to auscultation bilaterally, no crackles or wheezing   CARDIOVASCULAR:  Normal apical impulse, regular rate and rhythm, normal S1 and S2, no S3 or S4, and no murmur noted     ABDOMEN:  No scars, normal bowel sounds, soft, non-distended, non-tender, no masses palpated, no hepatosplenomegally    GENITAL/URINARY:    MUSCULOSKELETAL:  There is no redness, warmth, or swelling of the joints. Full range of motion noted. Motor strength is 5 out of 5 all extremities bilaterally. Tone is normal.   Back patient has in the mid back in the thoracic area a small round cystic lesion with redness and a few areas of good drainage consistent with an abscess and carbuncle. Tender to touch. But no streaking from it. NEUROLOGIC:  Awake, alert, oriented to name, place and time. Cranial nerves II-XII are grossly intact. Motor is 5 out of 5 bilaterally. Sensory is intact. gait is normal.       SKIN:  no bruising or bleeding, normal skin color, texture, turgor, no redness, warmth, or swelling and no rashes     RECENT DATA:  No results found for: CBC, BMP    DATA:     Component Value Units   Basic Metabolic Panel [[id number]] (Abnormal)    Collected: 02/16/23 0609    Updated: 02/16/23 0636    Specimen Source: Blood     Glucose 123 High  mg/dL    BUN 12 mg/dL    Creatinine 0.75 mg/dL    Est, Glom Filt Rate >60 mL/min/1.73m2    Comment:        These results are not intended for use in patients <25years of age. eGFR results are calculated without a race factor using the 2021 CKD-EPI equation. Careful clinical correlation is recommended, particularly when comparing to results   calculated using previous equations.    The CKD-EPI equation is less accurate in patients with extremes of muscle mass, extra-renal metabolism of creatine, excessive creatine ingestion, or following therapy that affects   renal tubular secretion. Calcium 8.7 mg/dL    Sodium 140 mmol/L    Potassium 5.0 mmol/L    Chloride 104 mmol/L    CO2 30 mmol/L    Anion Gap 6 Low  mmol/L   CBC with Auto Differential [[id number]] (Abnormal)    Collected: 02/16/23 0609    Updated: 02/16/23 0622    Specimen Source: Blood     WBC 6.2 k/uL    RBC 4.27 Low  m/uL    Hemoglobin 13.3 Low  g/dL    Hematocrit 39.6 Low  %    MCV 92.8 fL    MCH 31.2 pg    MCHC 33.6 g/dL    RDW 12.7 %    Platelets 955 k/uL    MPV 7.4 fL    Seg Neutrophils 56 %    Lymphocytes 31 %    Monocytes 9 High  %    Eosinophils % 3 %    Basophils 1 %    Segs Absolute 3.50 k/uL    Absolute Lymph # 1.90 k/uL    Absolute Mono # 0.60 k/uL    Absolute Eos # 0.20 k/uL    Basophils Absolute 0.00 k/uL   Basic Metabolic Panel [[id number]] (Abnormal)    Collected: 02/15/23 1657    Updated: 02/15/23 1746    Specimen Source: Blood     Glucose 134 High  mg/dL    BUN 15 mg/dL    Creatinine 0.82 mg/dL    Est, Glom Filt Rate >60 mL/min/1.73m2    Comment:        These results are not intended for use in patients <25years of age. eGFR results are calculated without a race factor using the 2021 CKD-EPI equation. Careful clinical correlation is recommended, particularly when comparing to results   calculated using previous equations. The CKD-EPI equation is less accurate in patients with extremes of muscle mass, extra-renal   metabolism of creatine, excessive creatine ingestion, or following therapy that affects   renal tubular secretion.         Calcium 9.3 mg/dL    Sodium 143 mmol/L    Potassium 4.7 mmol/L    Chloride 106 mmol/L    CO2 29 mmol/L    Anion Gap 8 Low  mmol/L   CBC with Auto Differential [[id number]] (Abnormal)    Collected: 02/15/23 1657    Updated: 02/15/23 1724    Specimen Source: Blood     WBC 6.1 k/uL    RBC 4.58 m/uL    Hemoglobin 14.3 g/dL    Hematocrit 42.6 %    MCV 92.9 fL MCH 31.3 pg    MCHC 33.7 g/dL    RDW 13.0 %    Platelets 613 k/uL    MPV 7.7 fL    Seg Neutrophils 59 %    Lymphocytes 28 %    Monocytes 9 High  %    Eosinophils % 3 %    Basophils 1 %    Segs Absolute 3.60 k/uL    Absolute Lymph # 1.70 k/uL    Absolute Mono # 0.50 k/uL    Absolute Eos # 0.20 k/uL    Basophils Absolute 0.10          ASSESSMENT:  Patient Active Problem List   Diagnosis Code    Herniated cervical disc without myelopathy M50.20    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         Electronically signed by TOBIN Mckeon on 2/16/2023 at 7:44 [address] [facility], [address]

## 2023-02-16 NOTE — ANESTHESIA POSTPROCEDURE EVALUATION
POST- ANESTHESIA EVALUATION       Pt Name: Jyothi Jaime  MRN: [id number]  YOB: 1970  Date of evaluation: 2/16/2023  Time:  3:00 PM      /68   Pulse (!) 47   Temp 97.9 °F (36.6 °C)   Resp 10   Ht 6' 1\" (1.854 m)   Wt 270 lb 4.5 oz (122.6 kg)   SpO2 94%   BMI 35.66 kg/m²      Consciousness Level  Awake  Cardiopulmonary Status  Stable  Pain Adequately Treated YES  Nausea / Vomiting  NO  Adequate Hydration  YES  Anesthesia Related Complications NONE      Electronically signed by Stef Ambrose MD on 2/16/2023 at 3:00 PM       Department of Anesthesiology  Postprocedure Note    Patient: Jyothi Jaime  MRN: [id number]  YOB: 1970  Date of evaluation: 2/16/2023      Procedure Summary     Date: 02/16/23 Room / Location: [address] [place] [address] [address] / [address]  [facility]    Anesthesia Start: 9764 Anesthesia Stop: 7191    Procedure: INCISION AND DRAINAGE LARGE INFECTED CYST ON BACK (Back) Diagnosis:       Back abscess      (INFECTED CYST ON BACK)    Surgeons: Jodie Montes MD Responsible Provider: Stef Ambrose MD    Anesthesia Type: General ASA Status: 2          Anesthesia Type: General    Isidro Phase I: Isidro Score: 10    Isidro Phase II:        Anesthesia Post Evaluation

## 2023-02-16 NOTE — PLAN OF CARE
Problem: Discharge Planning  Goal: Discharge to home or other facility with appropriate resources  2/16/2023 1700 by Anette Teixeira RN  Outcome: Progressing  2/16/2023 0450 by Matheus Harris RN  Outcome: Progressing     Problem: ABCDS Injury Assessment  Goal: Absence of physical injury  2/16/2023 1700 by Anette Teixeira RN  Outcome: Progressing  2/16/2023 0450 by Matheus Harris RN  Outcome: Progressing     Problem: Safety - Adult  Goal: Free from fall injury  2/16/2023 1700 by Anette Teixeira RN  Outcome: Progressing  Note: Patient remains free of falls and injuries throughout shift. Bed remains in the lowest position, wheels locked, call light and bedside table are within reach.    2/16/2023 0450 by Matheus Harris RN  Outcome: Progressing     Problem: Pain  Goal: Verbalizes/displays adequate comfort level or baseline comfort level  Outcome: Progressing

## 2023-02-17 VITALS
HEIGHT: 73 IN | RESPIRATION RATE: 18 BRPM | DIASTOLIC BLOOD PRESSURE: 75 MMHG | OXYGEN SATURATION: 97 % | WEIGHT: 270.28 LBS | SYSTOLIC BLOOD PRESSURE: 153 MMHG | BODY MASS INDEX: 35.82 KG/M2 | TEMPERATURE: 97.7 F | HEART RATE: 53 BPM

## 2023-02-17 LAB
ABSOLUTE EOS #: 0 K/UL (ref 0–0.4)
ABSOLUTE LYMPH #: 1.4 K/UL (ref 1–4.8)
ABSOLUTE MONO #: 0.7 K/UL (ref 0.1–1.3)
ANION GAP SERPL CALCULATED.3IONS-SCNC: 8 MMOL/L (ref 9–17)
BASOPHILS # BLD: 1 % (ref 0–2)
BASOPHILS ABSOLUTE: 0.1 K/UL (ref 0–0.2)
BUN SERPL-MCNC: 9 MG/DL (ref 6–20)
CALCIUM SERPL-MCNC: 8.6 MG/DL (ref 8.6–10.4)
CHLORIDE SERPL-SCNC: 105 MMOL/L (ref 98–107)
CO2 SERPL-SCNC: 27 MMOL/L (ref 20–31)
CREAT SERPL-MCNC: 0.67 MG/DL (ref 0.7–1.2)
EOSINOPHILS RELATIVE PERCENT: 0 % (ref 0–4)
GFR SERPL CREATININE-BSD FRML MDRD: >60 ML/MIN/1.73M2
GLUCOSE SERPL-MCNC: 141 MG/DL (ref 70–99)
HCT VFR BLD AUTO: 39.2 % (ref 41–53)
HGB BLD-MCNC: 13.1 G/DL (ref 13.5–17.5)
LYMPHOCYTES # BLD: 12 % (ref 24–44)
MCH RBC QN AUTO: 31.1 PG (ref 26–34)
MCHC RBC AUTO-ENTMCNC: 33.4 G/DL (ref 31–37)
MCV RBC AUTO: 93 FL (ref 80–100)
MONOCYTES # BLD: 6 % (ref 1–7)
PDW BLD-RTO: 12.9 % (ref 11.5–14.9)
PLATELET # BLD AUTO: 276 K/UL (ref 150–450)
PMV BLD AUTO: 7.2 FL (ref 6–12)
POTASSIUM SERPL-SCNC: 4.6 MMOL/L (ref 3.7–5.3)
RBC # BLD: 4.22 M/UL (ref 4.5–5.9)
SEG NEUTROPHILS: 81 % (ref 36–66)
SEGMENTED NEUTROPHILS ABSOLUTE COUNT: 9.3 K/UL (ref 1.3–9.1)
SODIUM SERPL-SCNC: 140 MMOL/L (ref 135–144)
VANCOMYCIN RANDOM DATE LAST DOSE: NORMAL
VANCOMYCIN RANDOM DOSE AMOUNT: 1000
VANCOMYCIN RANDOM TIME LAST DOSE: 542
VANCOMYCIN RANDOM: 14.1 UG/ML
WBC # BLD AUTO: 11.5 K/UL (ref 3.5–11)

## 2023-02-17 PROCEDURE — 80048 BASIC METABOLIC PNL TOTAL CA: CPT

## 2023-02-17 PROCEDURE — 2580000003 HC RX 258: Performed by: SURGERY

## 2023-02-17 PROCEDURE — 96361 HYDRATE IV INFUSION ADD-ON: CPT

## 2023-02-17 PROCEDURE — 6370000000 HC RX 637 (ALT 250 FOR IP): Performed by: SURGERY

## 2023-02-17 PROCEDURE — A4216 STERILE WATER/SALINE, 10 ML: HCPCS | Performed by: SURGERY

## 2023-02-17 PROCEDURE — G0378 HOSPITAL OBSERVATION PER HR: HCPCS

## 2023-02-17 PROCEDURE — 96366 THER/PROPH/DIAG IV INF ADDON: CPT

## 2023-02-17 PROCEDURE — 36415 COLL VENOUS BLD VENIPUNCTURE: CPT

## 2023-02-17 PROCEDURE — 85025 COMPLETE CBC W/AUTO DIFF WBC: CPT

## 2023-02-17 PROCEDURE — 2500000003 HC RX 250 WO HCPCS: Performed by: SURGERY

## 2023-02-17 PROCEDURE — 6360000002 HC RX W HCPCS: Performed by: SURGERY

## 2023-02-17 PROCEDURE — 80202 ASSAY OF VANCOMYCIN: CPT

## 2023-02-17 RX ORDER — OXYCODONE HYDROCHLORIDE AND ACETAMINOPHEN 5; 325 MG/1; MG/1
1 TABLET ORAL EVERY 6 HOURS PRN
Qty: 28 TABLET | Refills: 0 | Status: SHIPPED | OUTPATIENT
Start: 2023-02-17 | End: 2023-02-24

## 2023-02-17 RX ORDER — ONDANSETRON 4 MG/1
TABLET, FILM COATED ORAL
Qty: 20 TABLET | Refills: 0 | Status: SHIPPED | OUTPATIENT
Start: 2023-02-17

## 2023-02-17 RX ORDER — CEPHALEXIN 500 MG/1
CAPSULE ORAL
Qty: 21 CAPSULE | Refills: 0 | Status: SHIPPED | OUTPATIENT
Start: 2023-02-17

## 2023-02-17 RX ORDER — SULFAMETHOXAZOLE AND TRIMETHOPRIM 800; 160 MG/1; MG/1
1 TABLET ORAL 2 TIMES DAILY
Qty: 28 TABLET | Refills: 0 | Status: SHIPPED | OUTPATIENT
Start: 2023-02-17 | End: 2023-03-03

## 2023-02-17 RX ADMIN — SODIUM CHLORIDE: 9 INJECTION, SOLUTION INTRAVENOUS at 13:42

## 2023-02-17 RX ADMIN — OXYCODONE HYDROCHLORIDE AND ACETAMINOPHEN 1 TABLET: 5; 325 TABLET ORAL at 09:54

## 2023-02-17 RX ADMIN — FAMOTIDINE 20 MG: 10 INJECTION, SOLUTION INTRAVENOUS at 09:58

## 2023-02-17 RX ADMIN — VANCOMYCIN HYDROCHLORIDE 1250 MG: 1.5 INJECTION, POWDER, LYOPHILIZED, FOR SOLUTION INTRAVENOUS at 05:42

## 2023-02-17 RX ADMIN — VANCOMYCIN HYDROCHLORIDE 1500 MG: 1.5 INJECTION, POWDER, LYOPHILIZED, FOR SOLUTION INTRAVENOUS at 16:03

## 2023-02-17 ASSESSMENT — PAIN SCALES - GENERAL: PAINLEVEL_OUTOF10: 6

## 2023-02-17 NOTE — PROGRESS NOTES
[id number] [employer]      PROGRESS NOTE        Patient:  Rd Cohen  YOB: 1970    MRN: [id number]     Acct: [de-identified]     Admit date: 2/15/2023    Pt seen and Chart reviewed. Consultant notes reviewed and care evaluated. Subjective: Patient is doing better today he said yesterday after surgery he had a lot of pain but he is okay after take pain medication. He is ready to go home discussed with  patient will be discharged home today looks like. Looks like he did get the prescription for ceftriaxone and doxycycline from his PCP I asked him maybe continue just with the doxycycline 100 mg twice a day so far his cultures negative    Diet:  ADULT DIET; Regular      Medications:Current Inpatient    Scheduled Meds:   famotidine (PEPCID) injection  20 mg IntraVENous BID    vancomycin (VANCOCIN) intermittent dosing (placeholder)   Other RX Placeholder    vancomycin  1,250 mg IntraVENous Q12H    atorvastatin  40 mg Oral Nightly     Continuous Infusions:   sodium chloride 100 mL/hr at 02/16/23 1516     PRN Meds:oxyCODONE-acetaminophen, ondansetron, fentanNYL, fentanNYL, oxyCODONE-acetaminophen, acetaminophen        Physical Exam:  Vitals: BP (!) 153/75   Pulse 53   Temp 97.7 °F (36.5 °C)   Resp 18   Ht 6' 1\" (1.854 m)   Wt 270 lb 4.5 oz (122.6 kg)   SpO2 97%   BMI 35.66 kg/m²   24 hour intake/output:  Intake/Output Summary (Last 24 hours) at 2/17/2023 0737  Last data filed at 2/16/2023 1959  Gross per 24 hour   Intake 925 ml   Output 600 ml   Net 325 ml     Last 3 weights:   Wt Readings from Last 3 Encounters:   02/15/23 270 lb 4.5 oz (122.6 kg)   12/19/22 245 lb (111.1 kg)   12/05/22 245 lb (111.1 kg)       Physical Examination:   General appearance - alert, well appearing, and in no distress  Mental status - alert, oriented to person, place, and time  oropharynx clear, no lesions  Chest - clear to auscultation, no wheezes, rales or rhonchi, symmetric air entry  Heart - normal rate, regular rhythm, normal S1, S2, no murmurs, rubs, clicks or gallops  Abdomen - soft, nontender, nondistended, no masses or organomegaly  Neurological - alert, oriented, normal speech, no focal findings or movement disorder noted}  Back he has about 2 to 3 cm incision horizontally oriented and he has a packing and looks like wet-to-dry the surrounding tissue has less erythema mostly just serous type drainage on the Band-Aid. Extremities - peripheral pulses normal, no pedal edema, no clubbing or cyanosis  Skin - normal coloration and turgor, no rashes, no suspicious skin lesions noted      Component Value Units   Basic Metabolic Panel [[id number]] (Abnormal)    Collected: 02/17/23 0615    Updated: 02/17/23 0736    Specimen Source: Blood     Glucose 141 High  mg/dL    BUN 9 mg/dL    Creatinine 0.67 Low  mg/dL    Est, Glom Filt Rate >60 mL/min/1.73m2    Comment:        These results are not intended for use in patients <25years of age. eGFR results are calculated without a race factor using the 2021 CKD-EPI equation. Careful clinical correlation is recommended, particularly when comparing to results   calculated using previous equations. The CKD-EPI equation is less accurate in patients with extremes of muscle mass, extra-renal   metabolism of creatine, excessive creatine ingestion, or following therapy that affects   renal tubular secretion.         Calcium 8.6 mg/dL    Sodium 140 mmol/L    Potassium 4.6 mmol/L    Chloride 105 mmol/L    CO2 27 mmol/L    Anion Gap 8 Low  mmol/L   CBC with Auto Differential [[id number]] (Abnormal)    Collected: 02/17/23 0615    Updated: 02/17/23 0629    Specimen Source: Blood     WBC 11.5 High  k/uL    RBC 4.22 Low  m/uL    Hemoglobin 13.1 Low  g/dL    Hematocrit 39.2 Low  %    MCV 93.0 fL    MCH 31.1 pg    MCHC 33.4 g/dL    RDW 12.9 %    Platelets 505 k/uL    MPV 7.2 fL    Seg Neutrophils 81 High  %    Lymphocytes 12 Low %    Monocytes 6 %    Eosinophils % 0 %    Basophils 1 %    Segs Absolute 9.30 High  k/uL    Absolute Lymph # 1.40 k/uL    Absolute Mono # 0.70 k/uL    Absolute Eos # 0.00 k/uL    Basophils Absolute 0.10 k/uL   Culture, Anaerobic and Aerobic [[id number]]    Collected: 02/16/23 2138    Updated: 02/16/23 2257    Specimen Type: Swab    Specimen Source: Back     Specimen Description . BACK SWAB    Direct Exam NO NEUTROPHILS SEEN     NO ORGANISMS SEEN    Culture PENDING       Assessment:  Principal Problem:    Cellulitis of lower back  Resolved Problems:    * No resolved hospital problems. 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     Casandra Swartz DO FAAFP            2/17/2023, 7:37 AM

## 2023-02-17 NOTE — DISCHARGE INSTRUCTIONS
Your information:  Name: Boom Gupta  : 1970        What to do after you leave the hospital:    Recommended diet: regular diet    Recommended activity: activity as tolerated        The following personal items were collected during your admission and were returned to you:    Belongings  Dental Appliances: None  Vision - Corrective Lenses: None  Hearing Aid: None  Clothing:  (hospital gown)  Jewelry: None  Body Piercings Removed: N/A  Electronic Devices: None  Weapons (Notify Protective Services/Security): None  Other Valuables:  (none)  Home Medications: None  Valuables Given To: [facility]  Provide Name(s) of Who Valuable(s) Were Given To: with patient  Responsible person(s) in the waiting room: call Ryann Boyer  Patient approves for provider to speak to responsible person post operatively: Yes    Information obtained by:  By signing below, I understand that if any problems occur once I leave the hospital I am to contact my PCP. I understand and acknowledge receipt of the instructions indicated above.

## 2023-02-17 NOTE — PLAN OF CARE
Problem: Discharge Planning  Goal: Discharge to home or other facility with appropriate resources  2/17/2023 0509 by Candy Sullivan RN  Outcome: Progressing  2/16/2023 1700 by Ledy Ramon RN  Outcome: Progressing     Problem: ABCDS Injury Assessment  Goal: Absence of physical injury  2/17/2023 0509 by Candy Sullivan RN  Outcome: Progressing  2/16/2023 1700 by Ledy Ramon RN  Outcome: Progressing     Problem: Safety - Adult  Goal: Free from fall injury  2/17/2023 0509 by Candy Sullivan RN  Outcome: Progressing  2/16/2023 1700 by Leyd Ramon RN  Outcome: Progressing  Note: Patient remains free of falls and injuries throughout shift. Bed remains in the lowest position, wheels locked, call light and bedside table are within reach.       Problem: Pain  Goal: Verbalizes/displays adequate comfort level or baseline comfort level  2/16/2023 1700 by Ledy Ramon RN  Outcome: Progressing

## 2023-02-17 NOTE — DISCHARGE SUMMARY
Physician Discharge Summary     Date of admission: 2/15/2023    Discharge date: 2/17/2023    Admission Diagnosis: Cellulitis of lower back [L03.312]    Discharge Diagnosis: Infected epidermal inclusion cyst lower back with surrounding cellulitis    Brief Hospitalization Details:  Jennifer Enriquez is a 46 y.o. male who was admitted for the management of Cellulitis of lower back    20-year-old gentleman with a large infected epidermal inclusion cyst lower back with surrounding cellulitis underwent incision and drainage under MAC anesthesia. Postoperatively patient did well. IV antibiotics were continued. Pain is controlled. Wound packing was changed. Cellulitis has improved. Pain is controlled. BMP is unremarkable. WBC count is 11.5 mildly elevated likely postop. Hemoglobin 13.1. Patient will be discharged home in a stable condition today. Discharge instructions in the chart. NIA signed. Prescriptions called in. Office follow-up discussed.     Current Discharge Medication List        CONTINUE these medications which have NOT CHANGED    Details   Multiple Vitamins-Minerals (THERAPEUTIC MULTIVITAMIN-MINERALS) tablet Take 1 tablet by mouth daily      atorvastatin (LIPITOR) 40 MG tablet Take 40 mg by mouth daily           STOP taking these medications       naproxen (NAPROSYN) 500 MG tablet Comments:   Reason for Stopping:         varenicline (CHANTIX) 1 MG tablet Comments:   Reason for Stopping:               Condition at Discharge: good    Electronically signed by Aura Matta MD on 2/17/2023 at 3:11 PM

## 2023-02-17 NOTE — PLAN OF CARE
Problem: Discharge Planning  Goal: Discharge to home or other facility with appropriate resources  2/17/2023 1316 by Patrick Muñiz RN  Outcome: Progressing  2/17/2023 0509 by Albertina Flor RN  Outcome: Progressing     Problem: ABCDS Injury Assessment  Goal: Absence of physical injury  2/17/2023 1316 by Patrick Muñiz RN  Outcome: Progressing  2/17/2023 0509 by Albertina Flor RN  Outcome: Progressing     Problem: Safety - Adult  Goal: Free from fall injury  2/17/2023 1316 by Patrick Muñiz RN  Outcome: Progressing  Note: Patient remains free of falls and injuries throughout shift. Bed remains in the lowest position, wheels locked, call light and bedside table are within reach.    2/17/2023 0509 by Albertina Flor RN  Outcome: Progressing     Problem: Pain  Goal: Verbalizes/displays adequate comfort level or baseline comfort level  Outcome: Progressing

## 2023-02-17 NOTE — DISCHARGE INSTR - COC
Continuity of Care Form    Patient Name: Lena Gilbert   :  1970  MRN:  [id number]    Admit date:  2/15/2023  Discharge date:  ***    Code Status Order: Full Code   Advance Directives:   Advance Care Flowsheet Documentation       Date/Time Healthcare Directive Type of Healthcare Directive Copy in [address] Agent's Name Healthcare Agent's Phone Number    23 1225 No, patient does not have an advance directive for healthcare treatment -- -- -- -- --            Admitting Physician:  Giuliana Harvey MD  PCP: Shahla Barry DO    Discharging Nurse: [facility] Unit/Room#: 0/-  Discharging Unit Phone Number: ***    Emergency Contact:   Extended Emergency Contact Information  Primary Emergency Contact: [place] Phone: [phone]  Relation: Spouse    Past Surgical History:  Past Surgical History:   Procedure Laterality Date    ABDOMEN SURGERY      laporoscopy  for acid reflux    BACK SURGERY      x3    BACK SURGERY N/A 2023    INCISION AND DRAINAGE LARGE INFECTED CYST ON BACK performed by Giuliana Harvey MD at [address] [facility] 2022    COLONOSCOPY DIAGNOSTIC WITH EUA performed by Giuliana Harvey MD at [address] N/A 2022    COLORECTAL CANCER SCREENING, NOT HIGH RISK performed by Giuliana Harvey MD at 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 Cellulitis of lower back L03.312       Isolation/Infection:   Isolation            No Isolation          Patient Infection Status       None to display            Nurse Assessment:  Last Vital Signs: BP (!) 153/75   Pulse 53   Temp 97.7 °F (36.5 °C)   Resp 18   Ht 6' 1\" (1.854 m)   Wt 270 lb 4.5 oz (122.6 kg)   SpO2 97%   BMI 35.66 kg/m²     Last documented pain score (0-10 scale): Pain Level: 6  Last Weight:   Wt Readings from Last 1 Encounters:   02/15/23 270 lb 4.5 oz (122.6 kg)     Mental Status:  {IP PT MENTAL STATUS:20030}    IV Access:  { NIA IV ACCESS:533980041}    Nursing Mobility/ADLs:  Walking   {CHP DME GYRH:986648798}  Transfer  {CHP DME NPTZ:920851940}  Bathing  {CHP DME CDLF:359786224}  Dressing  {CHP DME WAHJ:199085790}  Toileting  {CHP DME BLFS:211140845}  Feeding  {CHP DME QDHU:338473285}  Med Admin  {P DME ZVQY:476321273}  Med Delivery   { NIA MED Delivery:905607778}    Wound Care Documentation and Therapy:  Wound 02/15/23 Back Lower;Medial abscess (Active)   Wound Etiology Surgical 02/17/23 1200   Dressing Status New dressing applied 02/17/23 1200   Wound Cleansed Other (Comment) 02/16/23 1408   Dressing/Treatment Gauze dressing/dressing sponge 02/17/23 1200   Wound Assessment Other (Comment) 02/15/23 1912   Drainage Amount Scant 02/17/23 1200   Drainage Description Other (Comment) 02/15/23 1912   Elaine-wound Assessment Ecchymosis 02/15/23 1912   Number of days: 1       Incision 02/16/23 Back Lower;Medial;Right (Active)   Dressing Status Clean;Dry; Intact 02/17/23 0210   Dressing/Treatment Dry dressing;ABD pad 02/17/23 0210   Drainage Amount None 02/17/23 0210   Number of days: 1        Elimination:  Continence:    Bowel: {YES / NA:67569}  Bladder: {YES / CV:13782}  Urinary Catheter: {Urinary Catheter:714439434}   Colostomy/Ileostomy/Ileal Conduit: {YES / OR:53047}       Date of Last BM: ***    Intake/Output Summary (Last 24 hours) at 2/17/2023 1512  Last data filed at 2/16/2023 1959  Gross per 24 hour   Intake --   Output 600 ml   Net -600 ml     I/O last 3 completed shifts: In: 36 [I.V.:925]  Out: 600 [Urine:600]    Safety Concerns:     [facility] NIA Safety Concerns:133714305}    Impairments/Disabilities:      [facility] Impairments/Disabilities:219218410}    Nutrition Therapy:  Current Nutrition Therapy:   [facility] Diet List:319277323}    Routes of Feeding: {[facility] DME Other Feedings:148960685}  Liquids: {Slp liquid thickness:53051}  Daily Fluid Restriction: {CHP DME Yes amt example:276636569}  Last Modified Barium Swallow with Video (Video Swallowing Test): {Done Not Done HMWF:766650939}    Treatments at the Time of Hospital Discharge:   Respiratory Treatments: ***  Oxygen Therapy:  {Therapy; copd oxygen:34811}  Ventilator:    { CC Vent IXYJ:898428952}    Rehab Therapies: {THERAPEUTIC INTERVENTION:1084580138}  Weight Bearing Status/Restrictions: [facility] Weight Bearin}  Other Medical Equipment (for information only, NOT a DME order):  {EQUIPMENT:893621238}  Other Treatments: ***    Patient's personal belongings (please select all that are sent with patient):  {[facility] DME Belongings:774423602}    RN SIGNATURE:  {Esignature:024852444}    CASE MANAGEMENT/SOCIAL WORK SECTION    Inpatient Status Date: ***    Readmission Risk Assessment Score:  Readmission Risk              Risk of Unplanned Readmission:  0           Discharging to Facility/ Agency   Name:   Address:  Phone:  Fax:    Dialysis Facility (if applicable)   Name:  Address:  Dialysis Schedule:  Phone:  Fax:    / signature: {Esignature:061157033}    PHYSICIAN SECTION    Prognosis: Good    Condition at Discharge: Stable    Rehab Potential (if transferring to Rehab): Good    Recommended Labs or Other Treatments After Discharge: Wet-to-dry dressing changes twice daily with half-inch iodoform gauze. Patient may shower. No driving until off Percocet.     Physician Certification: I certify the above information and transfer of Boom Gupta  is necessary for the continuing treatment of the diagnosis listed and that he requires Home Care for less 30 days.      Update Admission H&P: No change in H&P    PHYSICIAN SIGNATURE:  Electronically signed by Kym Medellin MD on 2/17/23 at 3:13 PM EST

## 2023-02-17 NOTE — CARE COORDINATION
ONGOING DISCHARGE PLAN:    Patient is alert and oriented x4. Spoke with patient regarding discharge plan and patient confirms that plan is still to return to home w/ Wife. Pt. Is POD #1, I & D of back. Pt's Wife, will be doing Dressing changes at home. Informed Nurse, Clayton Campuzano, to send Dressing supplies home w/ Pt. Pt. Denies VNS, Informed to let writer know if needs change. Remains on IV Vanco.    Will continue to follow for additional discharge needs.     Electronically signed by Laura Berger RN on 2/17/2023 at 9:14 AM

## 2023-02-17 NOTE — PROGRESS NOTES
Vancomycin Dosing by Pharmacy - Daily Note   Vancomycin Therapy Day:  3  Indication: SSTI    Allergies:  Seasonal   Actual Weight:    Wt Readings from Last 1 Encounters:   02/15/23 270 lb 4.5 oz (122.6 kg)       Labs/Ancillary Data  Estimated Creatinine Clearance: 177 mL/min (A) (based on SCr of 0.67 mg/dL (L)). Recent Labs     02/15/23  1657 02/16/23  0609 02/17/23  0615   CREATININE 0.82 0.75 0.67*   BUN 15 12 9   WBC 6.1 6.2 11.5*     No results found for: PROCAL    Intake/Output Summary (Last 24 hours) at 2/17/2023 1327  Last data filed at 2/16/2023 1959  Gross per 24 hour   Intake 925 ml   Output 600 ml   Net 325 ml     Temp: 97.7 F    Culture Date / Source  /  Results  2/16 - back swab    Recent vancomycin administrations                     vancomycin (VANCOCIN) 1,250 mg in sodium chloride 0.9 % 250 mL IVPB (mg) 1,250 mg New Bag 02/17/23 0542     1,250 mg New Bag 02/16/23 1739     1,250 mg New Bag  0557    vancomycin (VANCOCIN) 2,500 mg in sodium chloride 0.9 % 500 mL IVPB (mg) 2,500 mg New Bag 02/15/23 2009                    Vancomycin Concentrations:   TROUGH:  No results for input(s): VANCOTROUGH in the last 72 hours. RANDOM:    Recent Labs     02/17/23  1210   VANCORANDOM 14.1       MRSA Nasal Swab: N/A. Non-respiratory infection. [place] PLAN     Increase dose to 1500 mg q12h IV  Ensured BUN/sCr ordered at baseline and every 48 hours x at least 3 levels, then at least weekly.   Repeat vancomycin concentration ordered for TBD  Pharmacy will continue to monitor patient and adjust therapy as indicated      Vancomycin Target Concentration Parameters  Treatment  Population Target AUC/WILBERTO Target Trough   Invasive MRSA Infection (bacteremia, pneumonia, meningitis, endocarditis, osteomyelitis)  Sepsis (undifferentiated) 400-600 N/A   Infection due to non-MRSA pathogen  Empiric treatment of non-invasive MRSA infection  (SSTI, UTI) <500 10-15 mg/L   CrCl < 29 mL/min  Rapidly fluctuating serum creatinine   LUCRETIA N/A < 15 mg/L     Renal replacement therapy is dosed by levels, per hospital protocol. Abbreviations  * Pauc: probability that AUC is >400 (efficacy); Pconc: probability that Ctrough is above 20 ?g/mL (toxicity); Tox: Probability of nephrotoxicity, based on Krysta et al. Clin Infect Dis 2009. Thank you for the consult. Pharmacy will continue to follow.     Willy Ellsworth RPH,PharmD,  2/17/2023, 1:29 PM

## 2023-02-17 NOTE — PLAN OF CARE
Problem: Discharge Planning  Goal: Discharge to home or other facility with appropriate resources  2/17/2023 1850 by Juarez Khan RN  Outcome: Adequate for Discharge  2/17/2023 1316 by Juarez Khan RN  Outcome: Progressing  2/17/2023 0509 by Jose G Sigala RN  Outcome: Progressing     Problem: ABCDS Injury Assessment  Goal: Absence of physical injury  2/17/2023 1850 by Juarez Khan RN  Outcome: Adequate for Discharge  2/17/2023 1316 by Juarez Khan RN  Outcome: Progressing  2/17/2023 0509 by Jose G Sigala RN  Outcome: Progressing     Problem: Safety - Adult  Goal: Free from fall injury  2/17/2023 1850 by Juarez Khan RN  Outcome: Adequate for Discharge  2/17/2023 1316 by Juarez Khan RN  Outcome: Progressing  Note: Patient remains free of falls and injuries throughout shift. Bed remains in the lowest position, wheels locked, call light and bedside table are within reach.    2/17/2023 0509 by Jose G Sigala RN  Outcome: Progressing     Problem: Pain  Goal: Verbalizes/displays adequate comfort level or baseline comfort level  2/17/2023 1850 by Juarez Khan RN  Outcome: Adequate for Discharge  2/17/2023 1316 by Juarez Khan RN  Outcome: Progressing

## 2023-02-17 NOTE — OP NOTE
Operative Note      Patient: Lena Gilbert  YOB: 1970  MRN: [id number]    Date of Procedure: 2/16/2023            Preoperative diagnosis: Infected epidermal inclusion cyst low back with surrounding cellulitis    Postoperative diagnosis: Same    Procedure: Incision and drainage infected epidermal inclusion cyst low back    Surgeon: Dr. Genora Heimlich    Asst.: None    Anesthesia: MAC/local    Preparation: Hibiclens    EBL: Less than 10 mL    Specimen: Cultures obtained    Procedure: Informed consent was obtained. Patient was on IV antibiotics. He was taken to the operating room. He was lying in the left lateral position. Intravenous anesthesia was given. Vital signs were monitored remained stable. Operative site was prepped and draped in usual sterile fashion. Timeout was done. Local anesthetic was infiltrated. Incision and drainage of infected epidermal inclusion cyst was performed on the lower back. Copious amount of pus was drained and cyst contents were drained. Incision and drainage was performed down to the fascia. Wound was copiously irrigated until returning fluid was clear. Hemostasis was confirmed. Sponge needle instrument counts found to be correct. Prior to irrigating the wound cultures were obtained. Wound was packed open using half-inch iodoform gauze. Clean dressing was applied. Patient tolerated procedure well and was transferred to the recovery room in a stable condition.  -  Recommendations: Resume diet. Wound care ordered. IV antibiotics. Pain control. Likely discharge to home tomorrow.

## 2023-02-19 LAB
MICROORGANISM SPEC CULT: NORMAL
MICROORGANISM/AGENT SPEC: NORMAL
MICROORGANISM/AGENT SPEC: NORMAL
SPECIMEN DESCRIPTION: NORMAL

## 2023-05-18 NOTE — H&P
HISTORY and [facility]       NAME:  Haley Downing  MRN: [id number]   YOB: 1970   Date: 5/19/2023   Age: 48 y.o. Gender: male       COMPLAINT AND PRESENT HISTORY:     Haley Downing is 48 y.o. male, here for preadmission testing related to epidermal inclusion cyst with scheduled EXCISION EPIDERMAL CYST ON LOWER BACK per Dr. Delores Linares. Below italics a portion of discharge summary by Dr. Deloers Linares: Reviewed:   Discharge Summary  El Rodriguez MD (Physician)   General Surgery  Physician Discharge Summary      Date of admission: 2/15/2023     Discharge date: 2/17/2023     Admission Diagnosis: Cellulitis of lower back [L03.312]     Discharge Diagnosis: Infected epidermal inclusion cyst lower back with surrounding cellulitis     Brief Hospitalization Details:  Haley Downing is a 46 y.o. male who was admitted for the management of Cellulitis of lower back     49-year-old gentleman with a large infected epidermal inclusion cyst lower back with surrounding cellulitis underwent incision and drainage under MAC anesthesia. Postoperatively patient did well. IV antibiotics were continued. Pain is controlled. Wound packing was changed. Cellulitis has improved. Pain is controlled. BMP is unremarkable. WBC count is 11.5 mildly elevated likely postop. Hemoglobin 13.1. Patient will be discharged home in a stable condition today. Discharge instructions in the chart. NIA signed. Prescriptions called in. Office follow-up discussed. UPDATE: Pt reports he was discharged on oral antibiotics in which he completed as prescribed. Dressing changes per order. Reports wound to lower back has subsequently healed. Denies redness, warmth, drainage from cyst. Denies any other treatments since February. Denies fever or chills. Denies pain at site of cyst.     Denies personal and family history of complications with anesthesia.      RECENT LABS, IMAGING AND TESTING     EKG

## 2023-05-19 ENCOUNTER — HOSPITAL ENCOUNTER (OUTPATIENT)
Dept: PREADMISSION TESTING | Age: 53
End: 2023-05-19
Attending: SURGERY | Admitting: SURGERY
Payer: COMMERCIAL

## 2023-05-19 VITALS
HEART RATE: 59 BPM | BODY MASS INDEX: 33.13 KG/M2 | RESPIRATION RATE: 18 BRPM | OXYGEN SATURATION: 97 % | DIASTOLIC BLOOD PRESSURE: 69 MMHG | WEIGHT: 250 LBS | SYSTOLIC BLOOD PRESSURE: 117 MMHG | HEIGHT: 73 IN | TEMPERATURE: 98.2 F

## 2023-05-19 LAB
ANION GAP SERPL CALCULATED.3IONS-SCNC: 12 MMOL/L (ref 9–17)
BASOPHILS # BLD: 0 K/UL (ref 0–0.2)
BASOPHILS NFR BLD: 1 % (ref 0–2)
BUN SERPL-MCNC: 12 MG/DL (ref 6–20)
CALCIUM SERPL-MCNC: 9.3 MG/DL (ref 8.6–10.4)
CHLORIDE SERPL-SCNC: 102 MMOL/L (ref 98–107)
CO2 SERPL-SCNC: 27 MMOL/L (ref 20–31)
CREAT SERPL-MCNC: 0.85 MG/DL (ref 0.7–1.2)
EOSINOPHIL # BLD: 0.1 K/UL (ref 0–0.4)
EOSINOPHILS RELATIVE PERCENT: 2 % (ref 0–4)
ERYTHROCYTE [DISTWIDTH] IN BLOOD BY AUTOMATED COUNT: 13.4 % (ref 11.5–14.9)
GFR SERPL CREATININE-BSD FRML MDRD: >60 ML/MIN/1.73M2
GLUCOSE SERPL-MCNC: 111 MG/DL (ref 70–99)
HCT VFR BLD AUTO: 42.8 % (ref 41–53)
HGB BLD-MCNC: 14.3 G/DL (ref 13.5–17.5)
LYMPHOCYTES # BLD: 29 % (ref 24–44)
LYMPHOCYTES NFR BLD: 1.8 K/UL (ref 1–4.8)
MCH RBC QN AUTO: 31.1 PG (ref 26–34)
MCHC RBC AUTO-ENTMCNC: 33.4 G/DL (ref 31–37)
MCV RBC AUTO: 93.1 FL (ref 80–100)
MONOCYTES NFR BLD: 0.6 K/UL (ref 0.1–1.3)
MONOCYTES NFR BLD: 10 % (ref 1–7)
NEUTROPHILS NFR BLD: 58 % (ref 36–66)
NEUTS SEG NFR BLD: 3.8 K/UL (ref 1.3–9.1)
PLATELET # BLD AUTO: 222 K/UL (ref 150–450)
PMV BLD AUTO: 8.5 FL (ref 6–12)
POTASSIUM SERPL-SCNC: 4.2 MMOL/L (ref 3.7–5.3)
RBC # BLD AUTO: 4.59 M/UL (ref 4.5–5.9)
SODIUM SERPL-SCNC: 141 MMOL/L (ref 135–144)
WBC OTHER # BLD: 6.3 K/UL (ref 3.5–11)

## 2023-05-19 PROCEDURE — 80048 BASIC METABOLIC PNL TOTAL CA: CPT

## 2023-05-19 PROCEDURE — 85025 COMPLETE CBC W/AUTO DIFF WBC: CPT

## 2023-05-19 PROCEDURE — 36415 COLL VENOUS BLD VENIPUNCTURE: CPT

## 2023-05-19 PROCEDURE — APPSS45 APP SPLIT SHARED TIME 31-45 MINUTES: Performed by: NURSE PRACTITIONER

## 2023-05-19 ASSESSMENT — ENCOUNTER SYMPTOMS
WHEEZING: 0
ABDOMINAL PAIN: 0
CONSTIPATION: 0
NAUSEA: 0
VOMITING: 0
SHORTNESS OF BREATH: 0
SORE THROAT: 0
ROS SKIN COMMENTS: SEE HPI
COUGH: 0
DIARRHEA: 0
BACK PAIN: 0

## 2023-05-19 NOTE — DISCHARGE INSTRUCTIONS
Pre-op Instructions For Out-Patient Surgery    Medication Instructions:  Please stop herbs and any supplements now (includes vitamins and minerals). Please contact your surgeon and prescribing physician for pre-op instructions for any blood thinners. If you have inhalers/aerosol treatments at home, please use them the morning of your surgery and bring the inhalers with you to the hospital.    Please take the following medications the morning of your surgery with a sip of water:    None    Surgery Instructions:  After midnight before surgery:  Do not eat or drink anything, including water, mints, gum, and hard candy. You may brush your teeth without swallowing. No smoking, chewing tobacco, or street drugs. Please shower or bathe before surgery. If you were given Surgical Scrub Chlorhexidine Gluconate Liquid (CHG), please shower the night before and the morning of your surgery following the detailed instructions you received during your pre-admission visit. Please do not wear any cologne, lotion, powder, deodorant, jewelry, piercings, perfume, makeup, nail polish, hair accessories, or hair spray on the day of surgery. Wear loose comfortable clothing. Leave your valuables at home. Bring a storage case for any glasses/contacts. An adult who is responsible for you MUST drive you home and should be with you for the first 24 hours after surgery. If having out-patient knee and foot surgeries, please arrange for planned crutches, walker, or wheelchair before arriving to the hospital.    The Day of Surgery:  Arrive at [facility] Surgery Entrance at the time directed by your surgeon and check in at the desk. If you have a living will or healthcare power of , please bring a copy. You will be taken to the pre-op holding area where you will be prepared for surgery. A physical assessment will be performed by a nurse practitioner or house officer.

## 2023-06-01 ENCOUNTER — ANESTHESIA EVENT (OUTPATIENT)
Dept: OPERATING ROOM | Age: 53
End: 2023-06-01
Payer: COMMERCIAL

## 2023-06-02 ENCOUNTER — HOSPITAL ENCOUNTER (OUTPATIENT)
Age: 53
Setting detail: OUTPATIENT SURGERY
Discharge: HOME | End: 2023-06-02
Attending: SURGERY | Admitting: SURGERY
Payer: COMMERCIAL

## 2023-06-02 ENCOUNTER — ANESTHESIA (OUTPATIENT)
Dept: OPERATING ROOM | Age: 53
End: 2023-06-02
Payer: COMMERCIAL

## 2023-06-02 VITALS
OXYGEN SATURATION: 95 % | SYSTOLIC BLOOD PRESSURE: 143 MMHG | DIASTOLIC BLOOD PRESSURE: 82 MMHG | BODY MASS INDEX: 33.13 KG/M2 | TEMPERATURE: 97.3 F | HEIGHT: 73 IN | RESPIRATION RATE: 14 BRPM | WEIGHT: 250 LBS | HEART RATE: 57 BPM

## 2023-06-02 DIAGNOSIS — L72.0 EPIDERMAL INCLUSION CYST: ICD-10-CM

## 2023-06-02 PROCEDURE — 2500000003 HC RX 250 WO HCPCS: Performed by: NURSE ANESTHETIST, CERTIFIED REGISTERED

## 2023-06-02 PROCEDURE — 3600000012 HC SURGERY LEVEL 2 ADDTL 15MIN: Performed by: SURGERY

## 2023-06-02 PROCEDURE — 88304 TISSUE EXAM BY PATHOLOGIST: CPT

## 2023-06-02 PROCEDURE — 7100000031 HC ASPR PHASE II RECOVERY - ADDTL 15 MIN: Performed by: SURGERY

## 2023-06-02 PROCEDURE — 3600000002 HC SURGERY LEVEL 2 BASE: Performed by: SURGERY

## 2023-06-02 PROCEDURE — 2580000003 HC RX 258: Performed by: ANESTHESIOLOGY

## 2023-06-02 PROCEDURE — 2500000003 HC RX 250 WO HCPCS: Performed by: ANESTHESIOLOGY

## 2023-06-02 PROCEDURE — 6360000002 HC RX W HCPCS: Performed by: NURSE ANESTHETIST, CERTIFIED REGISTERED

## 2023-06-02 PROCEDURE — 7100000000 HC PACU RECOVERY - FIRST 15 MIN: Performed by: SURGERY

## 2023-06-02 PROCEDURE — 3700000001 HC ADD 15 MINUTES (ANESTHESIA): Performed by: SURGERY

## 2023-06-02 PROCEDURE — 2709999900 HC NON-CHARGEABLE SUPPLY: Performed by: SURGERY

## 2023-06-02 PROCEDURE — 3700000000 HC ANESTHESIA ATTENDED CARE: Performed by: SURGERY

## 2023-06-02 PROCEDURE — 6370000000 HC RX 637 (ALT 250 FOR IP): Performed by: ANESTHESIOLOGY

## 2023-06-02 PROCEDURE — 7100000030 HC ASPR PHASE II RECOVERY - FIRST 15 MIN: Performed by: SURGERY

## 2023-06-02 PROCEDURE — 7100000001 HC PACU RECOVERY - ADDTL 15 MIN: Performed by: SURGERY

## 2023-06-02 PROCEDURE — 7100000011 HC PHASE II RECOVERY - ADDTL 15 MIN: Performed by: SURGERY

## 2023-06-02 PROCEDURE — 6370000000 HC RX 637 (ALT 250 FOR IP): Performed by: NURSE ANESTHETIST, CERTIFIED REGISTERED

## 2023-06-02 PROCEDURE — 7100000010 HC PHASE II RECOVERY - FIRST 15 MIN: Performed by: SURGERY

## 2023-06-02 PROCEDURE — 2500000003 HC RX 250 WO HCPCS: Performed by: SURGERY

## 2023-06-02 RX ORDER — MIDAZOLAM HYDROCHLORIDE 1 MG/ML
INJECTION INTRAMUSCULAR; INTRAVENOUS PRN
Status: DISCONTINUED | OUTPATIENT
Start: 2023-06-02 | End: 2023-06-02 | Stop reason: SDUPTHER

## 2023-06-02 RX ORDER — SUCCINYLCHOLINE/SOD CL,ISO/PF 200MG/10ML
SYRINGE (ML) INTRAVENOUS PRN
Status: DISCONTINUED | OUTPATIENT
Start: 2023-06-02 | End: 2023-06-02 | Stop reason: SDUPTHER

## 2023-06-02 RX ORDER — SODIUM CHLORIDE 9 MG/ML
INJECTION, SOLUTION INTRAVENOUS PRN
Status: DISCONTINUED | OUTPATIENT
Start: 2023-06-02 | End: 2023-06-02 | Stop reason: HOSPADM

## 2023-06-02 RX ORDER — GABAPENTIN 300 MG/1
300 CAPSULE ORAL ONCE
Status: COMPLETED | OUTPATIENT
Start: 2023-06-02 | End: 2023-06-02

## 2023-06-02 RX ORDER — SULFAMETHOXAZOLE AND TRIMETHOPRIM 800; 160 MG/1; MG/1
1 TABLET ORAL 2 TIMES DAILY
Qty: 28 TABLET | Refills: 0 | Status: SHIPPED | OUTPATIENT
Start: 2023-06-02 | End: 2023-06-16

## 2023-06-02 RX ORDER — METOCLOPRAMIDE HYDROCHLORIDE 5 MG/ML
10 INJECTION INTRAMUSCULAR; INTRAVENOUS
Status: DISCONTINUED | OUTPATIENT
Start: 2023-06-02 | End: 2023-06-02 | Stop reason: HOSPADM

## 2023-06-02 RX ORDER — PROPOFOL 10 MG/ML
INJECTION, EMULSION INTRAVENOUS PRN
Status: DISCONTINUED | OUTPATIENT
Start: 2023-06-02 | End: 2023-06-02 | Stop reason: SDUPTHER

## 2023-06-02 RX ORDER — SODIUM CHLORIDE 0.9 % (FLUSH) 0.9 %
5-40 SYRINGE (ML) INJECTION PRN
Status: DISCONTINUED | OUTPATIENT
Start: 2023-06-02 | End: 2023-06-02 | Stop reason: HOSPADM

## 2023-06-02 RX ORDER — ONDANSETRON 4 MG/1
TABLET, FILM COATED ORAL
Qty: 20 TABLET | Refills: 0 | Status: SHIPPED | OUTPATIENT
Start: 2023-06-02

## 2023-06-02 RX ORDER — ROCURONIUM BROMIDE 10 MG/ML
INJECTION, SOLUTION INTRAVENOUS PRN
Status: DISCONTINUED | OUTPATIENT
Start: 2023-06-02 | End: 2023-06-02 | Stop reason: SDUPTHER

## 2023-06-02 RX ORDER — ACETAMINOPHEN 500 MG
1000 TABLET ORAL ONCE
Status: COMPLETED | OUTPATIENT
Start: 2023-06-02 | End: 2023-06-02

## 2023-06-02 RX ORDER — HYDROCODONE BITARTRATE AND ACETAMINOPHEN 5; 325 MG/1; MG/1
1 TABLET ORAL EVERY 6 HOURS PRN
Status: COMPLETED | OUTPATIENT
Start: 2023-06-02 | End: 2023-06-02

## 2023-06-02 RX ORDER — LIDOCAINE HYDROCHLORIDE 10 MG/ML
INJECTION, SOLUTION EPIDURAL; INFILTRATION; INTRACAUDAL; PERINEURAL PRN
Status: DISCONTINUED | OUTPATIENT
Start: 2023-06-02 | End: 2023-06-02 | Stop reason: SDUPTHER

## 2023-06-02 RX ORDER — CEPHALEXIN 500 MG/1
CAPSULE ORAL
Qty: 21 CAPSULE | Refills: 0 | Status: SHIPPED | OUTPATIENT
Start: 2023-06-02

## 2023-06-02 RX ORDER — LABETALOL HYDROCHLORIDE 5 MG/ML
10 INJECTION, SOLUTION INTRAVENOUS
Status: DISCONTINUED | OUTPATIENT
Start: 2023-06-02 | End: 2023-06-02 | Stop reason: HOSPADM

## 2023-06-02 RX ORDER — DEXAMETHASONE SODIUM PHOSPHATE 4 MG/ML
INJECTION, SOLUTION INTRA-ARTICULAR; INTRALESIONAL; INTRAMUSCULAR; INTRAVENOUS; SOFT TISSUE PRN
Status: DISCONTINUED | OUTPATIENT
Start: 2023-06-02 | End: 2023-06-02 | Stop reason: SDUPTHER

## 2023-06-02 RX ORDER — FENTANYL CITRATE 50 UG/ML
INJECTION, SOLUTION INTRAMUSCULAR; INTRAVENOUS PRN
Status: DISCONTINUED | OUTPATIENT
Start: 2023-06-02 | End: 2023-06-02 | Stop reason: SDUPTHER

## 2023-06-02 RX ORDER — LIDOCAINE HYDROCHLORIDE 10 MG/ML
1 INJECTION, SOLUTION EPIDURAL; INFILTRATION; INTRACAUDAL; PERINEURAL
Status: COMPLETED | OUTPATIENT
Start: 2023-06-02 | End: 2023-06-02

## 2023-06-02 RX ORDER — HYDRALAZINE HYDROCHLORIDE 20 MG/ML
10 INJECTION INTRAMUSCULAR; INTRAVENOUS
Status: DISCONTINUED | OUTPATIENT
Start: 2023-06-02 | End: 2023-06-02 | Stop reason: HOSPADM

## 2023-06-02 RX ORDER — BUPIVACAINE HYDROCHLORIDE 5 MG/ML
INJECTION, SOLUTION EPIDURAL; INTRACAUDAL PRN
Status: DISCONTINUED | OUTPATIENT
Start: 2023-06-02 | End: 2023-06-02 | Stop reason: ALTCHOICE

## 2023-06-02 RX ORDER — ALBUTEROL SULFATE 90 UG/1
AEROSOL, METERED RESPIRATORY (INHALATION) PRN
Status: DISCONTINUED | OUTPATIENT
Start: 2023-06-02 | End: 2023-06-02 | Stop reason: SDUPTHER

## 2023-06-02 RX ORDER — SODIUM CHLORIDE 0.9 % (FLUSH) 0.9 %
5-40 SYRINGE (ML) INJECTION EVERY 12 HOURS SCHEDULED
Status: DISCONTINUED | OUTPATIENT
Start: 2023-06-02 | End: 2023-06-02 | Stop reason: HOSPADM

## 2023-06-02 RX ORDER — ONDANSETRON 2 MG/ML
4 INJECTION INTRAMUSCULAR; INTRAVENOUS
Status: DISCONTINUED | OUTPATIENT
Start: 2023-06-02 | End: 2023-06-02 | Stop reason: HOSPADM

## 2023-06-02 RX ORDER — DIPHENHYDRAMINE HYDROCHLORIDE 50 MG/ML
12.5 INJECTION INTRAMUSCULAR; INTRAVENOUS
Status: DISCONTINUED | OUTPATIENT
Start: 2023-06-02 | End: 2023-06-02 | Stop reason: HOSPADM

## 2023-06-02 RX ORDER — SODIUM CHLORIDE 9 MG/ML
25 INJECTION, SOLUTION INTRAVENOUS PRN
Status: DISCONTINUED | OUTPATIENT
Start: 2023-06-02 | End: 2023-06-02 | Stop reason: HOSPADM

## 2023-06-02 RX ORDER — ONDANSETRON 2 MG/ML
INJECTION INTRAMUSCULAR; INTRAVENOUS PRN
Status: DISCONTINUED | OUTPATIENT
Start: 2023-06-02 | End: 2023-06-02 | Stop reason: SDUPTHER

## 2023-06-02 RX ORDER — FENTANYL CITRATE 0.05 MG/ML
25 INJECTION, SOLUTION INTRAMUSCULAR; INTRAVENOUS EVERY 5 MIN PRN
Status: DISCONTINUED | OUTPATIENT
Start: 2023-06-02 | End: 2023-06-02 | Stop reason: HOSPADM

## 2023-06-02 RX ORDER — SODIUM CHLORIDE, SODIUM LACTATE, POTASSIUM CHLORIDE, CALCIUM CHLORIDE 600; 310; 30; 20 MG/100ML; MG/100ML; MG/100ML; MG/100ML
INJECTION, SOLUTION INTRAVENOUS CONTINUOUS
Status: DISCONTINUED | OUTPATIENT
Start: 2023-06-02 | End: 2023-06-02 | Stop reason: HOSPADM

## 2023-06-02 RX ORDER — CEFAZOLIN SODIUM 1 G/3ML
INJECTION, POWDER, FOR SOLUTION INTRAMUSCULAR; INTRAVENOUS PRN
Status: DISCONTINUED | OUTPATIENT
Start: 2023-06-02 | End: 2023-06-02 | Stop reason: SDUPTHER

## 2023-06-02 RX ORDER — OXYCODONE HYDROCHLORIDE AND ACETAMINOPHEN 5; 325 MG/1; MG/1
1 TABLET ORAL EVERY 6 HOURS PRN
Qty: 28 TABLET | Refills: 0 | Status: SHIPPED | OUTPATIENT
Start: 2023-06-02 | End: 2023-06-09

## 2023-06-02 RX ADMIN — SODIUM CHLORIDE, POTASSIUM CHLORIDE, SODIUM LACTATE AND CALCIUM CHLORIDE: 600; 310; 30; 20 INJECTION, SOLUTION INTRAVENOUS at 08:55

## 2023-06-02 RX ADMIN — FENTANYL CITRATE 50 MCG: 50 INJECTION, SOLUTION INTRAMUSCULAR; INTRAVENOUS at 10:11

## 2023-06-02 RX ADMIN — LIDOCAINE HYDROCHLORIDE 1 ML: 10 INJECTION, SOLUTION EPIDURAL; INFILTRATION; INTRACAUDAL; PERINEURAL at 08:46

## 2023-06-02 RX ADMIN — GABAPENTIN 300 MG: 300 CAPSULE ORAL at 08:46

## 2023-06-02 RX ADMIN — ACETAMINOPHEN 1000 MG: 500 TABLET ORAL at 08:46

## 2023-06-02 RX ADMIN — ALBUTEROL SULFATE 4 PUFF: 90 AEROSOL, METERED RESPIRATORY (INHALATION) at 11:03

## 2023-06-02 RX ADMIN — SUGAMMADEX 200 MG: 100 INJECTION, SOLUTION INTRAVENOUS at 10:57

## 2023-06-02 RX ADMIN — HYDROCODONE BITARTRATE AND ACETAMINOPHEN 1 TABLET: 5; 325 TABLET ORAL at 12:26

## 2023-06-02 RX ADMIN — ROCURONIUM BROMIDE 40 MG: 50 INJECTION, SOLUTION INTRAVENOUS at 10:23

## 2023-06-02 RX ADMIN — MIDAZOLAM 2 MG: 1 INJECTION INTRAMUSCULAR; INTRAVENOUS at 10:06

## 2023-06-02 RX ADMIN — Medication 120 MG: at 10:12

## 2023-06-02 RX ADMIN — CEFAZOLIN 2 G: 1 INJECTION, POWDER, FOR SOLUTION INTRAMUSCULAR; INTRAVENOUS at 10:25

## 2023-06-02 RX ADMIN — PROPOFOL 200 MG: 10 INJECTION, EMULSION INTRAVENOUS at 10:11

## 2023-06-02 RX ADMIN — DEXAMETHASONE SODIUM PHOSPHATE 4 MG: 4 INJECTION, SOLUTION INTRA-ARTICULAR; INTRALESIONAL; INTRAMUSCULAR; INTRAVENOUS; SOFT TISSUE at 10:35

## 2023-06-02 RX ADMIN — FENTANYL CITRATE 50 MCG: 50 INJECTION, SOLUTION INTRAMUSCULAR; INTRAVENOUS at 10:53

## 2023-06-02 RX ADMIN — ONDANSETRON 4 MG: 2 INJECTION INTRAMUSCULAR; INTRAVENOUS at 10:50

## 2023-06-02 RX ADMIN — LIDOCAINE HYDROCHLORIDE 50 MG: 10 INJECTION, SOLUTION EPIDURAL; INFILTRATION; INTRACAUDAL; PERINEURAL at 10:11

## 2023-06-02 RX ADMIN — Medication 60 MG: at 10:14

## 2023-06-02 ASSESSMENT — PAIN DESCRIPTION - LOCATION: LOCATION: BACK

## 2023-06-02 ASSESSMENT — PAIN DESCRIPTION - PAIN TYPE: TYPE: SURGICAL PAIN

## 2023-06-02 ASSESSMENT — PAIN SCALES - GENERAL
PAINLEVEL_OUTOF10: 5
PAINLEVEL_OUTOF10: 0
PAINLEVEL_OUTOF10: 0

## 2023-06-02 ASSESSMENT — PAIN - FUNCTIONAL ASSESSMENT: PAIN_FUNCTIONAL_ASSESSMENT: NONE - DENIES PAIN

## 2023-06-02 ASSESSMENT — PAIN DESCRIPTION - DESCRIPTORS: DESCRIPTORS: SORE

## 2023-06-02 NOTE — BRIEF OP NOTE
Brief Postoperative Note      Patient: Renetta Barrett  YOB: 1970  MRN: [id number]    Date of Procedure: 6/2/2023    Pre-Op Diagnosis Codes:     * Epidermal inclusion cyst [L72.0]    Post-Op Diagnosis: Same       Procedure(s):  EXCISION EPIDERMAL CYST ON LOWER BACK    Surgeon(s):  Lio Orta MD    Assistant:  * No surgical staff found *    Anesthesia: General    Estimated Blood Loss (mL): less than 50     Complications: None    Specimens:   ID Type Source Tests Collected by Time Destination   A : LOWER BACK EPIDERMAL CYST Tissue Back SURGICAL PATHOLOGY Lio Orta MD 6/2/2023 1050        Implants:  * No implants in log *      Drains: * No LDAs found *    Findings: dictated  This procedure was not performed to treat primary cutaneous melanoma through wide local excision      Electronically signed by Lio Orta MD on 6/2/2023 at 11:02 AM

## 2023-06-02 NOTE — OP NOTE
Patient: Cleve Gonzales  YOB: 1970  MRN: [id number]    Date of Procedure: 6/2/2023            Preoperative diagnosis: Symptomatic epidermal inclusion cyst lower back    Postoperative diagnosis: Same    Procedure: Excision symptomatic epidermal inclusion cyst lower back (6 x 2.5 x 1 cm excision)    Surgeon: Dr. Marla Cullen    Asst.: None    Anesthesia: General    Preparation: Chloraprep    EBL: Less than 10 mL    Specimen: Cyst with scar    Procedure: Informed consent was obtained. Site was marked and confirmed. Preoperative antibiotic was given. Patient was taken to the operating room. General anesthesia was given. He was placed in the prone position. Operative site was prepped and draped in usual sterile fashion. Timeout was done. Incision was made at the marked site. Incision was deepened with help of Bovie cautery. Dissection was carried out. Approximately 6 x 2.5 x 1 cm excision was performed. Specimen was submitted to pathology. Wound was explored. Hemostasis was confirmed. Sponge needle instrument counts were to be correct. Wound was approximated using absorbable sutures. Skin was approximated using simple interrupted 3-0 Prolene sutures. Local anesthetic was infiltrated. Clean dressing was applied. Patient tolerated procedure well and was transferred to the recovery room in a stable condition.  -  Recommendations: Findings discussed with the family. Postoperative course recovery restrictions follow-up were all discussed. Prescriptions called in. Discharge instructions in the chart.

## 2023-06-02 NOTE — ANESTHESIA PRE PROCEDURE
Department of Anesthesiology  Preprocedure Note       Name:  Daxa Tyler   Age:  48 y.o.  :  1970                                          MRN:  [id number]         Date:  2023      Surgeon: Cady Mckee):  Nayan Montes MD    Procedure: Procedure(s):  EXCISION EPIDERMAL CYST ON LOWER BACK    Medications prior to admission:   Prior to Admission medications    Medication Sig Start Date End Date Taking? Authorizing Provider   Multiple Vitamins-Minerals (THERAPEUTIC MULTIVITAMIN-MINERALS) tablet Take 1 tablet by mouth daily    Historical Provider, MD   atorvastatin (LIPITOR) 40 MG tablet Take 1 tablet by mouth daily    Historical Provider, MD   naproxen (NAPROSYN) 500 MG tablet Take 1 tablet by mouth 2 times daily (with meals). Ok to resume on   Patient not taking: No sig reported 13  Aletha Austin MD   varenicline (CHANTIX) 1 MG tablet Take 1 mg by mouth 2 times daily. Patient not taking: Reported on 2022  Historical Provider, MD       Current medications:    Current Facility-Administered Medications   Medication Dose Route Frequency Provider Last Rate Last Admin    sodium chloride flush 0.9 % injection 5-40 mL  5-40 mL IntraVENous 2 times per day Shaneka Delaney MD        sodium chloride flush 0.9 % injection 5-40 mL  5-40 mL IntraVENous PRN Shaneka Delaney MD        0.9 % sodium chloride infusion   IntraVENous PRN Shaneka Delaney MD        lactated ringers IV soln infusion   IntraVENous Continuous Shaneka Delaney  mL/hr at 23 0901 NoRateChange at 23 0901    ceFAZolin (ANCEF) 2000 mg in 0.9% sodium chloride 50 mL IVPB  2,000 mg IntraVENous Once Nayan Montes MD           Allergies:     Allergies   Allergen Reactions    Seasonal      Other reaction(s): Unknown       Problem List:    Patient Active Problem List   Diagnosis Code    Herniated cervical disc without myelopathy M50.20    Proctitis K62.89    Cellulitis of lower back L03.312       Past Medical

## 2023-06-02 NOTE — DISCHARGE INSTRUCTIONS
Dr. Te Kaba Orders for Outpatient    1.) Diet:    [x]  As tolerated  []  Special     2.) Activity:    [x]  No lifting more than five to ten pounds 2 weeks  [x]  May Shower tomorrow  [x]  No driving until off pain medications     3.) Dressing:    [x]  Remove top dressing in 48 hours   []  Leave steri strips on     [x]  Remove and change dressing sooner if soaked    4.) Medication:    [x]  Take medication as prescribed   []  Resume blood thinners in  days    [x]  Resume home medications per AVS Summary    5.) Office visit: Follow up with Dr. Dalton Chery. Call to schedule an appointment if one has not been made. 6.) Call [phone] if you have any questions or concerns. Electronically signed by Harjinder Hamilton MD on 6/2/2023 at 11:03 AM     [address] [facility]    In order to continue your care at home, please follow the instructions below. For General Anesthesia  Do not drink any alcoholic beverages or make any legal or important decisions for 24 hours. Diet    Drink plenty of fluids after surgery, unless you are on a fluid restriction. After general anesthesia, start out eating lightly (broth, soup, crackers, toast, etc.) advancing as tolerated to your usual diet. Try to avoid spicy or greasy/fatty foods for 24 hours. Avoid milk/milk product for several hours. Medications  Take medications as instructed by your surgeon. Please do not take prescribed pain medication with alcoholic beverages. When cleared to drive by your surgeon, please do not drive or operate machinery while taking any prescribed pain medication. Activities  As instructed by your surgeon. Limit your activities for 24 hours. Avoid heavy work or sports until surgeon approves. No lifting, pushing, pulling, straining until approved by your surgeon and not more than 10 pounds. No driving or operating machinery until cleared by surgeon.   May shower tomorrow if incision was closed with

## 2023-06-02 NOTE — INTERVAL H&P NOTE
Update History & Physical    The patient's History and Physical of   May 19, 2023     Patient will be having : Procedure(s):  EXCISION EPIDERMAL CYST ON LOWER BACK  The surgical site was confirmed by the  patient and me. There was no change. Or updates at this time. Patient did not require clearance. Patient has been NPO since midnight. No blood thinners in the past 5-7 days. Patient denies any personal or family problems with anesthesia. Patient was physically assessed, including cardiovascular and respiratory. Patient understands and wants to proceed with the procedure.      Electronically signed by SHASTA Collier CNP on 6/2/2023 at 8:14 AM    Note marked for cosign by provider

## 2023-06-02 NOTE — ANESTHESIA POSTPROCEDURE EVALUATION
Department of Anesthesiology  Postprocedure Note    Patient: Sangeeta Escalera  MRN: [id number]  YOB: 1970  Date of evaluation: 6/2/2023      Procedure Summary     Date: 06/02/23 Room / Location: [address] [place] [address] Gerry Platt 04 / [facility]: JAMARCUS GARCIA    Anesthesia Start: 1006 Anesthesia Stop: 2999    Procedure: EXCISION EPIDERMAL CYST ON LOWER BACK (Back) Diagnosis:       Epidermal inclusion cyst      (EPIDERMAL INCLUSION CYST ON LOWER BACK)    Surgeons: Navneet Benitez MD Responsible Provider: Amado Hickman MD    Anesthesia Type: general ASA Status: 2          Anesthesia Type: No value filed.     Isidro Phase I: Isidro Score: 10    Isidro Phase II:        Anesthesia Post Evaluation    Comments: POST- ANESTHESIA EVALUATION       Pt Name: Sangeeta Escalera  MRN: [id number]  YOB: 1970  Date of evaluation: 6/2/2023  Time:  12:15 PM      /76   Pulse 56   Temp 97.1 °F (36.2 °C)   Resp 10   Ht 6' 1\" (1.854 m)   Wt 250 lb (113.4 kg)   SpO2 95%   BMI 32.98 kg/m²      Consciousness Level  Awake  Cardiopulmonary Status  Stable  Pain Adequately Treated YES  Nausea / Vomiting  NO  Adequate Hydration  YES  Anesthesia Related Complications NONE      Electronically signed by Amado Hickman MD on 6/2/2023 at 12:15 PM

## 2023-06-05 LAB — DERMATOLOGY PATHOLOGY REPORT: NORMAL

## 2023-08-15 ENCOUNTER — OFFICE VISIT (OUTPATIENT)
Dept: UROLOGY | Age: 53
End: 2023-08-15
Payer: COMMERCIAL

## 2023-08-15 VITALS
WEIGHT: 265 LBS | SYSTOLIC BLOOD PRESSURE: 110 MMHG | RESPIRATION RATE: 16 BRPM | HEART RATE: 56 BPM | HEIGHT: 73 IN | DIASTOLIC BLOOD PRESSURE: 72 MMHG | TEMPERATURE: 97.3 F | BODY MASS INDEX: 35.12 KG/M2 | OXYGEN SATURATION: 96 %

## 2023-08-15 DIAGNOSIS — N40.1 BENIGN PROSTATIC HYPERPLASIA WITH INCOMPLETE BLADDER EMPTYING: ICD-10-CM

## 2023-08-15 DIAGNOSIS — N52.01 ERECTILE DYSFUNCTION DUE TO ARTERIAL INSUFFICIENCY: Primary | ICD-10-CM

## 2023-08-15 DIAGNOSIS — R39.14 BENIGN PROSTATIC HYPERPLASIA WITH INCOMPLETE BLADDER EMPTYING: ICD-10-CM

## 2023-08-15 PROCEDURE — 99214 OFFICE O/P EST MOD 30 MIN: CPT | Performed by: UROLOGY

## 2023-08-15 RX ORDER — TADALAFIL 20 MG/1
20 TABLET ORAL DAILY PRN
Qty: 10 TABLET | Refills: 3 | Status: SHIPPED | OUTPATIENT
Start: 2023-08-15

## 2023-08-15 RX ORDER — SILDENAFIL 100 MG/1
TABLET, FILM COATED ORAL
COMMUNITY
Start: 2023-05-30

## 2023-08-15 RX ORDER — TADALAFIL 5 MG/1
5 TABLET ORAL DAILY
Qty: 30 TABLET | Refills: 3 | Status: SHIPPED | OUTPATIENT
Start: 2023-08-15

## 2023-08-15 ASSESSMENT — ENCOUNTER SYMPTOMS
WHEEZING: 0
VOMITING: 0
SHORTNESS OF BREATH: 0
DIARRHEA: 0
EYE REDNESS: 0
COUGH: 0
ABDOMINAL PAIN: 0
EYE PAIN: 0
BACK PAIN: 0
NAUSEA: 0
CONSTIPATION: 0

## 2023-08-15 NOTE — PROGRESS NOTES
[address]  [address] [facility] [address]  Dept: [address] [place] [address] Urology Office Note - Established    Patient:  Cynthia Perez  YOB: 1970  Date: 8/15/2023    The patient is a 48 y.o. male who presents todayfor evaluation of the following problems:   Chief Complaint   Patient presents with    Erectile Dysfunction    Benign Prostatic Hypertrophy       HPI  Here for bph and ED. Erections have been worsening. Viagra he tried and didn't work. He urinates well. No dysuria    Summary of old records: N/A    Additional History: N/A    Procedures Today: N/A    Urinalysis today:  No results found for this visit on 08/15/23. Last several PSA's:  Lab Results   Component Value Date    PSA 1.11 11/04/2022    PSA 1.41 05/07/2021    PSA 1.57 09/20/2017     Last total testosterone:  Lab Results   Component Value Date    TESTOSTERONE 458 09/20/2017       AUA Symptom Score (8/15/2023):                                Last BUN and creatinine:  Lab Results   Component Value Date    BUN 12 05/19/2023     Lab Results   Component Value Date    CREATININE 0.85 05/19/2023       Additional Lab/Culture results: none    Imaging Reviewed during this Office Visit: none  (results were independently reviewed by physician and radiology report verified)    PAST MEDICAL, FAMILY AND SOCIAL HISTORY UPDATE:  Past Medical History:   Diagnosis Date    GERD (gastroesophageal reflux disease)     not treated    History of back surgery     2002, 2008, 2009    Hx of neck surgery     Hyperlipidemia     Rectal abscess     August 2022     Past Surgical History:   Procedure Laterality Date    ABDOMEN SURGERY      laporoscopy  for acid reflux    BACK SURGERY      x3    BACK SURGERY N/A 2/16/2023    INCISION AND DRAINAGE LARGE INFECTED CYST ON BACK performed by Jakob Eason MD at [address] N/A 08/26/2022    COLONOSCOPY DIAGNOSTIC WITH

## 2023-08-16 ENCOUNTER — HOSPITAL ENCOUNTER (OUTPATIENT)
Dept: SLEEP CENTER | Age: 53
Discharge: HOME | End: 2023-08-18
Payer: COMMERCIAL

## 2023-08-16 PROCEDURE — G0399 HOME SLEEP TEST/TYPE 3 PORTA: HCPCS

## 2023-08-24 LAB — STATUS: NORMAL

## 2023-09-21 ENCOUNTER — HOSPITAL ENCOUNTER (OUTPATIENT)
Dept: SLEEP CENTER | Age: 53
Discharge: HOME | End: 2023-09-23
Payer: COMMERCIAL

## 2023-09-21 VITALS
RESPIRATION RATE: 15 BRPM | BODY MASS INDEX: 35.12 KG/M2 | HEIGHT: 73 IN | HEART RATE: 57 BPM | WEIGHT: 265 LBS | OXYGEN SATURATION: 93 %

## 2023-09-21 PROCEDURE — 95811 POLYSOM 6/>YRS CPAP 4/> PARM: CPT

## 2023-09-21 ASSESSMENT — SLEEP AND FATIGUE QUESTIONNAIRES
ESS TOTAL SCORE: 11
HOW LIKELY ARE YOU TO NOD OFF OR FALL ASLEEP WHEN YOU ARE A PASSENGER IN A CAR FOR AN HOUR WITHOUT A BREAK: 2
HOW LIKELY ARE YOU TO NOD OFF OR FALL ASLEEP WHILE SITTING INACTIVE IN A PUBLIC PLACE: 3
HOW LIKELY ARE YOU TO NOD OFF OR FALL ASLEEP WHILE SITTING AND TALKING TO SOMEONE: 0
HOW LIKELY ARE YOU TO NOD OFF OR FALL ASLEEP WHILE WATCHING TV: 1
HOW LIKELY ARE YOU TO NOD OFF OR FALL ASLEEP WHILE SITTING AND READING: 3
HOW LIKELY ARE YOU TO NOD OFF OR FALL ASLEEP WHILE SITTING QUIETLY AFTER LUNCH WITHOUT ALCOHOL: 1
HOW LIKELY ARE YOU TO NOD OFF OR FALL ASLEEP WHILE LYING DOWN TO REST IN THE AFTERNOON WHEN CIRCUMSTANCES PERMIT: 1
HOW LIKELY ARE YOU TO NOD OFF OR FALL ASLEEP IN A CAR, WHILE STOPPED FOR A FEW MINUTES IN TRAFFIC: 0

## 2023-10-01 LAB — STATUS: NORMAL

## 2024-02-12 ENCOUNTER — HOSPITAL ENCOUNTER (OUTPATIENT)
Age: 54
Discharge: HOME | End: 2024-02-12
Payer: COMMERCIAL

## 2024-02-12 DIAGNOSIS — N52.01 ERECTILE DYSFUNCTION DUE TO ARTERIAL INSUFFICIENCY: ICD-10-CM

## 2024-02-12 LAB — PSA SERPL-MCNC: 1.44 NG/ML

## 2024-02-12 PROCEDURE — 84153 ASSAY OF PSA TOTAL: CPT

## 2024-02-12 PROCEDURE — 36415 COLL VENOUS BLD VENIPUNCTURE: CPT

## 2024-02-20 ENCOUNTER — OFFICE VISIT (OUTPATIENT)
Dept: UROLOGY | Age: 54
End: 2024-02-20
Payer: COMMERCIAL

## 2024-02-20 VITALS
HEIGHT: 73 IN | HEART RATE: 82 BPM | SYSTOLIC BLOOD PRESSURE: 122 MMHG | BODY MASS INDEX: 35.12 KG/M2 | TEMPERATURE: 97.3 F | WEIGHT: 265 LBS | DIASTOLIC BLOOD PRESSURE: 78 MMHG

## 2024-02-20 DIAGNOSIS — R39.14 BENIGN PROSTATIC HYPERPLASIA WITH INCOMPLETE BLADDER EMPTYING: ICD-10-CM

## 2024-02-20 DIAGNOSIS — N40.1 BENIGN PROSTATIC HYPERPLASIA WITH INCOMPLETE BLADDER EMPTYING: ICD-10-CM

## 2024-02-20 DIAGNOSIS — N52.01 ERECTILE DYSFUNCTION DUE TO ARTERIAL INSUFFICIENCY: Primary | ICD-10-CM

## 2024-02-20 PROCEDURE — 99214 OFFICE O/P EST MOD 30 MIN: CPT | Performed by: UROLOGY

## 2024-02-20 RX ORDER — TADALAFIL 5 MG/1
5 TABLET ORAL DAILY
Qty: 30 TABLET | Refills: 3 | Status: SHIPPED | OUTPATIENT
Start: 2024-02-20

## 2024-02-20 ASSESSMENT — ENCOUNTER SYMPTOMS
EYE PAIN: 0
DIARRHEA: 0
CONSTIPATION: 0
EYES NEGATIVE: 1
GASTROINTESTINAL NEGATIVE: 1
RESPIRATORY NEGATIVE: 1
ABDOMINAL PAIN: 0
WHEEZING: 0
VOMITING: 0
COUGH: 0
NAUSEA: 0
SHORTNESS OF BREATH: 0
BACK PAIN: 0
EYE REDNESS: 0

## 2024-02-20 NOTE — PROGRESS NOTES
[facility] PHYSICIANS [facility], [facility] UROLOGY CENTER  [address]  [facility] [address]  Dept: [phone]    [facility] Urology Office Note - Established    Patient:  Gordon Roman  YOB: 1970  Date: 2/20/2024    The patient is a 53 y.o. male who presents todayfor evaluation of the following problems:   Chief Complaint   Patient presents with    Erectile Dysfunction     6 mnth PSA       HPI  Here for bph and ED. Erections have been worsening. Viagra he tried and didn't work. He urinates well. No dysuria he is taking 100mg with mixed results  Urinating ok    Summary of old records: N/A    Additional History: N/A    Procedures Today: N/A    Urinalysis today:  No results found for this visit on 02/20/24.  Last several PSA's:  Lab Results   Component Value Date    PSA 1.44 02/12/2024    PSA 1.11 11/04/2022    PSA 1.41 05/07/2021     Last total testosterone:  Lab Results   Component Value Date    TESTOSTERONE 458 09/20/2017       AUA Symptom Score (2/20/2024):                               Last BUN and creatinine:  Lab Results   Component Value Date    BUN 12 05/19/2023     Lab Results   Component Value Date    CREATININE 0.85 05/19/2023       Additional Lab/Culture results: none    Imaging Reviewed during this Office Visit: none  (results were independently reviewed by physician and radiology report verified)    PAST MEDICAL, FAMILY AND SOCIAL HISTORY UPDATE:  Past Medical History:   Diagnosis Date    GERD (gastroesophageal reflux disease)     not treated    History of back surgery     2002, 2008, 2009    Hx of neck surgery     Hyperlipidemia     Rectal abscess     August 2022     Past Surgical History:   Procedure Laterality Date    ABDOMEN SURGERY      laporoscopy  for acid reflux    BACK SURGERY      x3    BACK SURGERY N/A 2/16/2023    INCISION AND DRAINAGE LARGE INFECTED CYST ON BACK performed by Yoan Robles MD at [facility] OR    COLONOSCOPY N/A

## 2024-04-01 ENCOUNTER — HOSPITAL ENCOUNTER (OUTPATIENT)
Age: 54
Discharge: HOME | End: 2024-04-01
Payer: COMMERCIAL

## 2024-04-01 DIAGNOSIS — N52.01 ERECTILE DYSFUNCTION DUE TO ARTERIAL INSUFFICIENCY: ICD-10-CM

## 2024-04-01 LAB — TESTOST SERPL-MCNC: 335 NG/DL (ref 193–740)

## 2024-04-01 PROCEDURE — 84403 ASSAY OF TOTAL TESTOSTERONE: CPT

## 2024-04-01 PROCEDURE — 36415 COLL VENOUS BLD VENIPUNCTURE: CPT

## 2024-04-11 ENCOUNTER — OFFICE VISIT (OUTPATIENT)
Dept: UROLOGY | Age: 54
End: 2024-04-11
Payer: COMMERCIAL

## 2024-04-11 VITALS
SYSTOLIC BLOOD PRESSURE: 128 MMHG | OXYGEN SATURATION: 94 % | DIASTOLIC BLOOD PRESSURE: 80 MMHG | TEMPERATURE: 97.1 F | HEART RATE: 50 BPM | BODY MASS INDEX: 35.12 KG/M2 | WEIGHT: 265 LBS | HEIGHT: 73 IN

## 2024-04-11 DIAGNOSIS — R39.14 BENIGN PROSTATIC HYPERPLASIA WITH INCOMPLETE BLADDER EMPTYING: ICD-10-CM

## 2024-04-11 DIAGNOSIS — N40.1 BENIGN PROSTATIC HYPERPLASIA WITH INCOMPLETE BLADDER EMPTYING: ICD-10-CM

## 2024-04-11 DIAGNOSIS — N52.01 ERECTILE DYSFUNCTION DUE TO ARTERIAL INSUFFICIENCY: Primary | ICD-10-CM

## 2024-04-11 DIAGNOSIS — R79.89 LOW TESTOSTERONE: ICD-10-CM

## 2024-04-11 PROCEDURE — 99214 OFFICE O/P EST MOD 30 MIN: CPT | Performed by: NURSE PRACTITIONER

## 2024-04-12 ENCOUNTER — TELEPHONE (OUTPATIENT)
Dept: UROLOGY | Age: 54
End: 2024-04-12

## 2024-04-12 DIAGNOSIS — N52.01 ERECTILE DYSFUNCTION DUE TO ARTERIAL INSUFFICIENCY: Primary | ICD-10-CM

## 2024-04-12 DIAGNOSIS — R79.89 LOW TESTOSTERONE: ICD-10-CM

## 2024-04-12 NOTE — TELEPHONE ENCOUNTER
Pt called, stating \"Nafisa said she was going to send a prescription over to [facility], I just called and they havent received anything. I have an appointment next Tuesday I am wondering if I have to reschedule or not?\"  Please advise.

## 2024-04-15 RX ORDER — TESTOSTERONE CYPIONATE 200 MG/ML
100 INJECTION, SOLUTION INTRAMUSCULAR
Qty: 2 ML | Refills: 3 | Status: SHIPPED | OUTPATIENT
Start: 2024-04-15 | End: 2024-07-30

## 2024-04-15 NOTE — TELEPHONE ENCOUNTER
Please notify patient I  sent script and apologize for the inconvenience.  He will need rescheduled to next week for testosterone teaching.

## 2024-04-16 NOTE — PROGRESS NOTES
[facility] PHYSICIANS [facility] UROLOGY CENTER  [address]  [facility] [address]  Dept: [phone]    [facility] Urology Office Note - Established    Patient:  Gordon Roman  YOB: 1970  Date: 4/11/2024    The patient is a 54 y.o. male who presents todayfor evaluation of the following problems:   Chief Complaint   Patient presents with    Other     7 week T lab        HPI  Patient is a 54-year-old male with a history of BPH and ED presenting for follow-up.  His AUA symptom score today is 1.    He is using Cialis 5 mg daily.  His PSA level is 1.44  Regarding erections, he has issues achieving and maintaining.  He previously failed Viagra, he was having mixed results.  His testosterone was checked and borderline low at 335.  Does have issues with libido and some fatigue      Summary of old records: N/A    Additional History: N/A    Procedures Today: N/A    Urinalysis today:  No results found for this visit on 04/11/24.  Last several PSA's:  Lab Results   Component Value Date    PSA 1.44 02/12/2024    PSA 1.11 11/04/2022    PSA 1.41 05/07/2021     Last total testosterone:  Lab Results   Component Value Date    TESTOSTERONE 335 04/01/2024       AUA Symptom Score (4/16/2024):  INCOMPLETE EMPTYING: How often have you had the sensation of not emptying your bladder?: Not at all  FREQUENCY: How often do you have to urinate less than every two hours?: Not at all  INTERMITTENCY: How often have you found you stopped and started again several times when you urinated?: Not at all  URGENCY: How often have you found it difficult to postpone urination?: Not at all  WEAK STREAM: How often have you had a weak urinary stream?: Not at all  STRAINING: How often have you had to strain to start  urination?: Not at all  NOCTURIA: How many times did you typically get up at night to uriniate?: 1 Time  TOTAL I-PSS SCORE:: 1  How would you feel if you were to spend the rest

## 2024-04-23 ENCOUNTER — OFFICE VISIT (OUTPATIENT)
Dept: UROLOGY | Age: 54
End: 2024-04-23

## 2024-04-23 VITALS
SYSTOLIC BLOOD PRESSURE: 132 MMHG | HEIGHT: 73 IN | BODY MASS INDEX: 35.52 KG/M2 | DIASTOLIC BLOOD PRESSURE: 66 MMHG | OXYGEN SATURATION: 96 % | RESPIRATION RATE: 16 BRPM | HEART RATE: 64 BPM | WEIGHT: 268 LBS

## 2024-04-23 DIAGNOSIS — R68.82 LOW LIBIDO: ICD-10-CM

## 2024-04-23 DIAGNOSIS — R53.83 OTHER FATIGUE: ICD-10-CM

## 2024-04-23 DIAGNOSIS — R79.89 LOW TESTOSTERONE: Primary | ICD-10-CM

## 2024-04-23 DIAGNOSIS — F40.231 FEAR OF INJECTIONS: ICD-10-CM

## 2024-04-23 DIAGNOSIS — N52.01 ERECTILE DYSFUNCTION DUE TO ARTERIAL INSUFFICIENCY: ICD-10-CM

## 2024-04-23 RX ORDER — TESTOSTERONE UNDECANOATE 237 MG/1
237 CAPSULE, LIQUID FILLED ORAL 2 TIMES DAILY
Qty: 60 CAPSULE | Refills: 3 | Status: SHIPPED | OUTPATIENT
Start: 2024-04-23

## 2024-04-23 RX ORDER — TESTOSTERONE CYPIONATE 200 MG/ML
100 INJECTION, SOLUTION INTRAMUSCULAR ONCE
Status: COMPLETED | OUTPATIENT
Start: 2024-04-23 | End: 2024-04-23

## 2024-04-23 RX ADMIN — TESTOSTERONE CYPIONATE 100 MG: 200 INJECTION, SOLUTION INTRAMUSCULAR at 15:10

## 2024-04-23 ASSESSMENT — ENCOUNTER SYMPTOMS
WHEEZING: 0
ABDOMINAL PAIN: 0
EYE REDNESS: 0
NAUSEA: 0
VOMITING: 0
EYE PAIN: 0
BACK PAIN: 0
COUGH: 0
DIARRHEA: 0
CONSTIPATION: 0
SHORTNESS OF BREATH: 0

## 2024-04-23 NOTE — PROGRESS NOTES
Review of Systems   Constitutional:  Negative for chills, fatigue and fever.   Eyes:  Negative for pain, redness and visual disturbance.   Respiratory:  Negative for cough, shortness of breath and wheezing.    Cardiovascular:  Negative for chest pain and leg swelling.   Gastrointestinal:  Negative for abdominal pain, constipation, diarrhea, nausea and vomiting.   Genitourinary:  Negative for difficulty urinating, dysuria, flank pain, frequency, hematuria, scrotal swelling, testicular pain and urgency.   Musculoskeletal:  Negative for back pain, joint swelling and myalgias.   Skin:  Negative for rash and wound.   Neurological:  Negative for dizziness, tremors and numbness.   Hematological:  Does not bruise/bleed easily.     
History:   Diagnosis Date    GERD (gastroesophageal reflux disease)     not treated    History of back surgery     2002, 2008, 2009    Hx of neck surgery     Hyperlipidemia     Rectal abscess     August 2022     Past Surgical History:   Procedure Laterality Date    ABDOMEN SURGERY      laporoscopy  for acid reflux    BACK SURGERY      x3    BACK SURGERY N/A 2/16/2023    INCISION AND DRAINAGE LARGE INFECTED CYST ON BACK performed by Yoan Robles MD at [facility] OR    COLONOSCOPY N/A 08/26/2022    COLONOSCOPY DIAGNOSTIC WITH EUA performed by Yoan Robles MD at [facility] OR    COLONOSCOPY N/A 12/19/2022    COLORECTAL CANCER SCREENING, NOT HIGH RISK performed by Yoan Robles MD at [facility] ENDO    OTHER SURGICAL HISTORY N/A 12/19/2013    post cervical laminectomy C5-6 C 6-7    RECTAL SURGERY  08/26/2022    RECTAL PERIRECTAL INCISION AND DRAINAGE performed by Yoan Robles MD at [facility] OR    SKIN LESION EXCISION N/A 6/2/2023    EXCISION EPIDERMAL CYST ON LOWER BACK performed by Yoan Robles MD at [facility] OR    TONSILLECTOMY      VASECTOMY      WISDOM TOOTH EXTRACTION       Family History   Problem Relation Age of Onset    Diabetes Mother     Cancer Father      Outpatient Medications Marked as Taking for the 4/23/24 encounter (Office Visit) with Nafisa Scott APRN - CNP   Medication Sig Dispense Refill    Testosterone Undecanoate (JATENZO) 237 MG CAPS Take 237 mg by mouth in the morning and at bedtime Max Daily Amount: 474 mg 60 capsule 3    testosterone cypionate (DEPOTESTOTERONE CYPIONATE) 200 MG/ML injection Inject 0.5 mLs into the muscle every 7 days for 16 doses. Max Daily Amount: 100 mg 2 mL 3    SYRINGE-NEEDLE, DISP, 3 ML (SAFETY SYRINGE/NEEDLE) 21G X 1\" 3 ML MISC Use one syringe/needle combination every 7 days for intramuscular testosterone cypionate injections. 4 each 3    tadalafil (CIALIS) 5 MG tablet Take 1 tablet by mouth daily 30 tablet 3    sildenafil (VIAGRA) 100 MG tablet TAKE 1 TABLET BY MOUTH

## 2024-07-05 ENCOUNTER — HOSPITAL ENCOUNTER (OUTPATIENT)
Age: 54
Discharge: HOME | End: 2024-07-05
Payer: COMMERCIAL

## 2024-07-05 DIAGNOSIS — R79.89 LOW TESTOSTERONE: ICD-10-CM

## 2024-07-05 LAB
ALBUMIN SERPL-MCNC: 4.1 G/DL (ref 3.5–5.2)
ALP SERPL-CCNC: 90 U/L (ref 40–129)
ALT SERPL-CCNC: 26 U/L (ref 5–41)
AST SERPL-CCNC: 19 U/L
BILIRUB DIRECT SERPL-MCNC: 0.2 MG/DL
BILIRUB INDIRECT SERPL-MCNC: 0.4 MG/DL (ref 0–1)
BILIRUB SERPL-MCNC: 0.6 MG/DL (ref 0.3–1.2)
CHOLEST SERPL-MCNC: 152 MG/DL
CHOLESTEROL/HDL RATIO: 2.6
HCT VFR BLD AUTO: 44.8 % (ref 41–53)
HDLC SERPL-MCNC: 59 MG/DL
HGB BLD-MCNC: 14.8 G/DL (ref 13.5–17.5)
LDLC SERPL CALC-MCNC: 78 MG/DL (ref 0–130)
PROT SERPL-MCNC: 6.7 G/DL (ref 6.4–8.3)
TESTOST SERPL-MCNC: 381 NG/DL (ref 193–740)
TRIGL SERPL-MCNC: 77 MG/DL

## 2024-07-05 PROCEDURE — 36415 COLL VENOUS BLD VENIPUNCTURE: CPT

## 2024-07-05 PROCEDURE — 80061 LIPID PANEL: CPT

## 2024-07-05 PROCEDURE — 85018 HEMOGLOBIN: CPT

## 2024-07-05 PROCEDURE — 80076 HEPATIC FUNCTION PANEL: CPT

## 2024-07-05 PROCEDURE — 85014 HEMATOCRIT: CPT

## 2024-07-05 PROCEDURE — 84403 ASSAY OF TOTAL TESTOSTERONE: CPT

## 2024-07-08 DIAGNOSIS — R53.83 OTHER FATIGUE: ICD-10-CM

## 2024-07-08 DIAGNOSIS — R68.82 LOW LIBIDO: ICD-10-CM

## 2024-07-08 DIAGNOSIS — N52.01 ERECTILE DYSFUNCTION DUE TO ARTERIAL INSUFFICIENCY: ICD-10-CM

## 2024-07-08 DIAGNOSIS — F40.231 FEAR OF INJECTIONS: ICD-10-CM

## 2024-07-08 DIAGNOSIS — R79.89 LOW TESTOSTERONE: ICD-10-CM

## 2024-07-09 RX ORDER — TESTOSTERONE UNDECANOATE 237 MG/1
CAPSULE, LIQUID FILLED ORAL
Qty: 60 CAPSULE | Refills: 0 | Status: SHIPPED | OUTPATIENT
Start: 2024-07-09

## 2024-07-09 NOTE — TELEPHONE ENCOUNTER
Last seen 4/23/24  Plan:   Patient demonstrated understanding of IM testosterone injection.  He will inject 100 mg (0.5ml) IM once weekly.  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  I will try to see if Jatenzo would be approved, this would be a much better option for the patient.  Patient will otherwise follow-up in the office in 3 months with repeat labs.  Continue Cialis 5 mg daily as well.      Return in about 3 months (around 7/23/2024) for testosterone labs..  Next OV 7/23/24

## 2024-07-23 ENCOUNTER — OFFICE VISIT (OUTPATIENT)
Dept: UROLOGY | Age: 54
End: 2024-07-23
Payer: COMMERCIAL

## 2024-07-23 VITALS
DIASTOLIC BLOOD PRESSURE: 74 MMHG | BODY MASS INDEX: 34.99 KG/M2 | HEIGHT: 73 IN | RESPIRATION RATE: 16 BRPM | WEIGHT: 264 LBS | OXYGEN SATURATION: 94 % | SYSTOLIC BLOOD PRESSURE: 124 MMHG | HEART RATE: 55 BPM | TEMPERATURE: 97.5 F

## 2024-07-23 DIAGNOSIS — F40.231 FEAR OF INJECTIONS: ICD-10-CM

## 2024-07-23 DIAGNOSIS — N52.01 ERECTILE DYSFUNCTION DUE TO ARTERIAL INSUFFICIENCY: ICD-10-CM

## 2024-07-23 DIAGNOSIS — R53.83 OTHER FATIGUE: ICD-10-CM

## 2024-07-23 DIAGNOSIS — R79.89 LOW TESTOSTERONE: ICD-10-CM

## 2024-07-23 DIAGNOSIS — R68.82 LOW LIBIDO: ICD-10-CM

## 2024-07-23 PROCEDURE — 99214 OFFICE O/P EST MOD 30 MIN: CPT | Performed by: NURSE PRACTITIONER

## 2024-07-23 RX ORDER — TESTOSTERONE UNDECANOATE 237 MG/1
CAPSULE, LIQUID FILLED ORAL
Qty: 60 CAPSULE | Status: CANCELLED | OUTPATIENT
Start: 2024-07-23

## 2024-07-23 RX ORDER — TESTOSTERONE UNDECANOATE 158 MG/1
CAPSULE, LIQUID FILLED ORAL
Qty: 120 CAPSULE | Refills: 3 | Status: SHIPPED | OUTPATIENT
Start: 2024-07-23

## 2024-07-23 RX ORDER — TADALAFIL 5 MG/1
5 TABLET ORAL DAILY
Qty: 30 TABLET | Refills: 5 | Status: SHIPPED | OUTPATIENT
Start: 2024-07-23

## 2024-07-23 ASSESSMENT — ENCOUNTER SYMPTOMS
EYE PAIN: 0
EYE REDNESS: 0
COUGH: 0
NAUSEA: 0
VOMITING: 0
CONSTIPATION: 0
DIARRHEA: 0
ABDOMINAL PAIN: 0
WHEEZING: 0
BACK PAIN: 0
SHORTNESS OF BREATH: 0

## 2024-07-23 NOTE — PROGRESS NOTES
[facility] PHYSICIANS [facility], [facility] UROLOGY CENTER  [address]  [facility] [address]  Dept: [phone]    [facility] Urology Office Note - Established    Patient:  Gordon Roman  YOB: 1970  Date: 7/23/2024    The patient is a 54 y.o. male who presents todayfor evaluation of the following problems:   Chief Complaint   Patient presents with    Low testosterone      3 month labs        HPI  Here for f/u low testosterone.  He is on Jatenzo 237mg BID and tolerating well.  His labs were reviewed and stable.   Testosterone essentially unchanged, currently 381.  He still has some fatigue and low libido.   He is using cialis daily for urinary symptoms (helps stream) and ED.   Using sildenafil PRN for intercourse.         Summary of old records: N/A    Additional History: N/A    Procedures Today: N/A    Urinalysis today:  No results found for this visit on 07/23/24.  Last several PSA's:  Lab Results   Component Value Date    PSA 1.44 02/12/2024    PSA 1.11 11/04/2022    PSA 1.41 05/07/2021     Last total testosterone:  Lab Results   Component Value Date    TESTOSTERONE 381 07/05/2024         Last BUN and creatinine:  Lab Results   Component Value Date    BUN 12 05/19/2023     Lab Results   Component Value Date    CREATININE 0.85 05/19/2023       Additional Lab/Culture results: none    Imaging Reviewed during this Office Visit: none    PAST MEDICAL, FAMILY AND SOCIAL HISTORY UPDATE:  Past Medical History:   Diagnosis Date    GERD (gastroesophageal reflux disease)     not treated    History of back surgery     2002, 2008, 2009    Hx of neck surgery     Hyperlipidemia     Rectal abscess     August 2022     Past Surgical History:   Procedure Laterality Date    ABDOMEN SURGERY      laporoscopy  for acid reflux    BACK SURGERY      x3    BACK SURGERY N/A 2/16/2023    INCISION AND DRAINAGE LARGE INFECTED CYST ON BACK performed by Yoan Robles MD at [facility] OR

## 2024-08-21 ENCOUNTER — HOSPITAL ENCOUNTER (OUTPATIENT)
Age: 54
Discharge: HOME | End: 2024-08-23
Payer: COMMERCIAL

## 2024-08-21 ENCOUNTER — HOSPITAL ENCOUNTER (OUTPATIENT)
Dept: GENERAL RADIOLOGY | Age: 54
Discharge: HOME | End: 2024-08-23
Payer: COMMERCIAL

## 2024-08-21 DIAGNOSIS — S86.912A STRAIN OF KNEE, LEFT, INITIAL ENCOUNTER: ICD-10-CM

## 2024-08-21 PROCEDURE — 73560 X-RAY EXAM OF KNEE 1 OR 2: CPT

## 2024-08-22 ENCOUNTER — TELEPHONE (OUTPATIENT)
Dept: UROLOGY | Age: 54
End: 2024-08-22

## 2024-08-22 DIAGNOSIS — R68.82 LOW LIBIDO: ICD-10-CM

## 2024-08-22 DIAGNOSIS — F40.231 FEAR OF INJECTIONS: ICD-10-CM

## 2024-08-22 DIAGNOSIS — R53.83 OTHER FATIGUE: ICD-10-CM

## 2024-08-22 DIAGNOSIS — R79.89 LOW TESTOSTERONE: ICD-10-CM

## 2024-08-22 DIAGNOSIS — N52.01 ERECTILE DYSFUNCTION DUE TO ARTERIAL INSUFFICIENCY: ICD-10-CM

## 2024-08-22 NOTE — TELEPHONE ENCOUNTER
Patient called in and stated, \" The new pill Jatenzo is now $177.00 and he can't afford that every month.\" He is wondering if you could prescribe him something different?

## 2024-08-27 NOTE — TELEPHONE ENCOUNTER
Other options would be testosterone cypionate (Intramuscular injection), but last time we talked he said he had needle phobia- but could try if he is willing.     Could continue his 237mg BID dose if he prefers to stay on oral and well covered/    Let me know what he decides.

## 2024-08-28 RX ORDER — TESTOSTERONE UNDECANOATE 237 MG/1
CAPSULE, LIQUID FILLED ORAL
Qty: 60 CAPSULE | Refills: 3 | Status: SHIPPED | OUTPATIENT
Start: 2024-08-28

## 2024-08-28 NOTE — TELEPHONE ENCOUNTER
I renewed the prior order. Go back to 237mg capsules once in morning and once in evening with food.

## 2024-08-29 NOTE — TELEPHONE ENCOUNTER
Returned patient's voicemail in regards to medication. Left voicemail for patient with CNP Sonia's instruction. Patient is to call back if there is any issues with continuing previous medication, injection was offered if patient does choose that option. Phone number and extension left if the patient has any issues with direction.

## 2024-11-14 DIAGNOSIS — R53.83 OTHER FATIGUE: ICD-10-CM

## 2024-11-14 DIAGNOSIS — R79.89 LOW TESTOSTERONE: ICD-10-CM

## 2024-11-14 DIAGNOSIS — R68.82 LOW LIBIDO: ICD-10-CM

## 2024-11-14 DIAGNOSIS — N52.01 ERECTILE DYSFUNCTION DUE TO ARTERIAL INSUFFICIENCY: ICD-10-CM

## 2024-11-14 DIAGNOSIS — F40.231 FEAR OF INJECTIONS: ICD-10-CM

## 2024-11-21 RX ORDER — TESTOSTERONE UNDECANOATE 237 MG/1
CAPSULE, LIQUID FILLED ORAL
Qty: 60 CAPSULE | Refills: 5 | Status: SHIPPED | OUTPATIENT
Start: 2024-11-21

## 2024-11-21 NOTE — TELEPHONE ENCOUNTER
Medication name:Jatenzo   Medication dosage:237 mg  Preferred pharmacy:[facility]     Last office visit:  Next office or follow up plan- (may copy and paste from last office visit at bottom):       Plan:      Assessment & Plan  T has only improved slightly and he is symptomatic.  Will increase Jatenzo to 316mg BID.   Order sent, he will check on coverage.               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               - needle phobia with testosterone cypionate.   He will need repeat labs in about 3 mos with telephone visit at that time.   Continue tadalafil daily and sildenafil PRN.         Return in about 3 months (around 10/23/2024) for labs.

## 2025-01-28 ENCOUNTER — OFFICE VISIT (OUTPATIENT)
Dept: UROLOGY | Age: 55
End: 2025-01-28
Payer: COMMERCIAL

## 2025-01-28 VITALS
WEIGHT: 243 LBS | SYSTOLIC BLOOD PRESSURE: 118 MMHG | HEART RATE: 70 BPM | OXYGEN SATURATION: 97 % | DIASTOLIC BLOOD PRESSURE: 76 MMHG | BODY MASS INDEX: 32.2 KG/M2 | HEIGHT: 73 IN | TEMPERATURE: 97.8 F

## 2025-01-28 DIAGNOSIS — R79.89 LOW TESTOSTERONE: Primary | ICD-10-CM

## 2025-01-28 DIAGNOSIS — N52.01 ERECTILE DYSFUNCTION DUE TO ARTERIAL INSUFFICIENCY: ICD-10-CM

## 2025-01-28 DIAGNOSIS — N40.1 BENIGN PROSTATIC HYPERPLASIA WITH INCOMPLETE BLADDER EMPTYING: ICD-10-CM

## 2025-01-28 DIAGNOSIS — R39.14 BENIGN PROSTATIC HYPERPLASIA WITH INCOMPLETE BLADDER EMPTYING: ICD-10-CM

## 2025-01-28 PROCEDURE — 99213 OFFICE O/P EST LOW 20 MIN: CPT | Performed by: NURSE PRACTITIONER

## 2025-01-28 RX ORDER — TADALAFIL 20 MG/1
20 TABLET ORAL DAILY PRN
Qty: 30 TABLET | Refills: 0 | Status: SHIPPED | OUTPATIENT
Start: 2025-01-28

## 2025-01-28 ASSESSMENT — ENCOUNTER SYMPTOMS
EYE REDNESS: 0
RESPIRATORY NEGATIVE: 1
WHEEZING: 0
ALLERGIC/IMMUNOLOGIC NEGATIVE: 1
NAUSEA: 0
COUGH: 0
SHORTNESS OF BREATH: 0
VOMITING: 0
GASTROINTESTINAL NEGATIVE: 1
EYE PAIN: 0
BACK PAIN: 0
EYES NEGATIVE: 1
ABDOMINAL PAIN: 0
COLOR CHANGE: 0

## 2025-01-28 NOTE — PROGRESS NOTES
[facility] PHYSICIANS [facility], [facility] UROLOGY CENTER  [address]  [facility] [address]  Dept: [phone]    [facility] Urology Office Note - Established    Patient:  Gordon Roman  YOB: 1970  Date: 1/28/2025    The patient is a 54 y.o. male who presents todayfor evaluation of the following problems:   Chief Complaint   Patient presents with    Low testosterone     6 month f/u        HPI  Patient is a 53 yo male with low T and ED presenting for follow up.   He has been on Jatenzo 237mg BID and tolerating well.   We did try increasing dose, but insurance did not cover.   He has not recently updated his labs.   He remains symptomatic with fatigue and poor libido.   He is having issues maintaining erections.   Does not think daily cialis is helping anymore.  He does not think sildenafil PRN is helping either.   PSA was 1.44 2/12/24      Summary of old records: N/A    Additional History: N/A    Procedures Today: N/A    Urinalysis today:  No results found for this visit on 01/28/25.  Last several PSA's:  Lab Results   Component Value Date    PSA 1.44 02/12/2024    PSA 1.11 11/04/2022    PSA 1.41 05/07/2021     Last total testosterone:  Lab Results   Component Value Date    TESTOSTERONE 381 07/05/2024     Last BUN and creatinine:  Lab Results   Component Value Date    BUN 12 05/19/2023     Lab Results   Component Value Date    CREATININE 0.85 05/19/2023       Additional Lab/Culture results: none    Imaging Reviewed during this Office Visit: none  (results were independently reviewed by physician and radiology report verified)    PAST MEDICAL, FAMILY AND SOCIAL HISTORY UPDATE:  Past Medical History:   Diagnosis Date    GERD (gastroesophageal reflux disease)     not treated    History of back surgery     2002, 2008, 2009    Hx of neck surgery     Hyperlipidemia     Rectal abscess     August 2022     Past Surgical History:   Procedure Laterality Date    ABDOMEN

## 2025-02-01 ENCOUNTER — HOSPITAL ENCOUNTER (OUTPATIENT)
Age: 55
Discharge: HOME | End: 2025-02-01
Payer: COMMERCIAL

## 2025-02-01 DIAGNOSIS — R68.82 LOW LIBIDO: ICD-10-CM

## 2025-02-01 DIAGNOSIS — R79.89 LOW TESTOSTERONE: ICD-10-CM

## 2025-02-01 DIAGNOSIS — N40.1 BENIGN PROSTATIC HYPERPLASIA WITH INCOMPLETE BLADDER EMPTYING: ICD-10-CM

## 2025-02-01 DIAGNOSIS — N52.01 ERECTILE DYSFUNCTION DUE TO ARTERIAL INSUFFICIENCY: ICD-10-CM

## 2025-02-01 DIAGNOSIS — R39.14 BENIGN PROSTATIC HYPERPLASIA WITH INCOMPLETE BLADDER EMPTYING: ICD-10-CM

## 2025-02-01 LAB
HCT VFR BLD AUTO: 46.6 % (ref 41–53)
HGB BLD-MCNC: 15.3 G/DL (ref 13.5–17.5)
PSA SERPL-MCNC: 2.16 NG/ML (ref 0–4)
TESTOST SERPL-MCNC: 445 NG/DL (ref 193–740)

## 2025-02-01 PROCEDURE — 84153 ASSAY OF PSA TOTAL: CPT

## 2025-02-01 PROCEDURE — 85014 HEMATOCRIT: CPT

## 2025-02-01 PROCEDURE — 84403 ASSAY OF TOTAL TESTOSTERONE: CPT

## 2025-02-01 PROCEDURE — 36415 COLL VENOUS BLD VENIPUNCTURE: CPT

## 2025-02-01 PROCEDURE — 85018 HEMOGLOBIN: CPT

## 2025-02-18 ENCOUNTER — OFFICE VISIT (OUTPATIENT)
Dept: UROLOGY | Age: 55
End: 2025-02-18
Payer: COMMERCIAL

## 2025-02-18 VITALS
HEIGHT: 71 IN | BODY MASS INDEX: 34.02 KG/M2 | SYSTOLIC BLOOD PRESSURE: 124 MMHG | WEIGHT: 243 LBS | DIASTOLIC BLOOD PRESSURE: 82 MMHG | TEMPERATURE: 97.8 F | OXYGEN SATURATION: 96 % | HEART RATE: 60 BPM

## 2025-02-18 DIAGNOSIS — Z80.42 FAMILY HISTORY OF PROSTATE CANCER: Primary | ICD-10-CM

## 2025-02-18 DIAGNOSIS — R79.89 LOW TESTOSTERONE: ICD-10-CM

## 2025-02-18 DIAGNOSIS — N52.01 ERECTILE DYSFUNCTION DUE TO ARTERIAL INSUFFICIENCY: ICD-10-CM

## 2025-02-18 PROCEDURE — 99213 OFFICE O/P EST LOW 20 MIN: CPT | Performed by: NURSE PRACTITIONER

## 2025-02-18 RX ORDER — TADALAFIL 20 MG/1
20 TABLET ORAL DAILY PRN
Qty: 30 TABLET | Refills: 10 | Status: SHIPPED | OUTPATIENT
Start: 2025-02-18

## 2025-02-18 ASSESSMENT — ENCOUNTER SYMPTOMS
BACK PAIN: 0
EYE PAIN: 0
ALLERGIC/IMMUNOLOGIC NEGATIVE: 1
SHORTNESS OF BREATH: 0
RESPIRATORY NEGATIVE: 1
GASTROINTESTINAL NEGATIVE: 1
COLOR CHANGE: 0
WHEEZING: 0
NAUSEA: 0
COUGH: 0
ABDOMINAL PAIN: 0
EYES NEGATIVE: 1
EYE REDNESS: 0
VOMITING: 0

## 2025-02-20 NOTE — PROGRESS NOTES
[facility] PHYSICIANS [facility], [facility] UROLOGY CENTER  [address]  [facility] [address]  Dept: [phone]    [facility] Urology Office Note - Established    Patient:  Gordon Roman  YOB: 1970  Date: 2/20/2025    The patient is a 54 y.o. male who presents todayfor evaluation of the following problems:   Chief Complaint   Patient presents with    Discuss labs, possible xyosted       HPI  Here for fu low T.   He has been using Jatenzo 237mg BID.   His T level is 445, he feels decent.   H&H Reviewed, normal.   PSA is up a bit from before-- 2.16, fhx prostate cancer.  Fatigue has improved some, but still struggling with ED.   He has been on sildenafil PRN, but thinks tadalafil works a bit better.   He is a former smoker.    Summary of old records:   1/28/2025: Patient is a 53 yo male with low T and ED presenting for follow up.  He has been on Jatenzo 237mg BID and tolerating well.  We did try increasing dose, but insurance did not cover.  He has not recently updated his labs.  He remains symptomatic with fatigue and poor libido.  He is having issues maintaining erections.  Does not think daily cialis is helping anymore. He does not think sildenafil PRN is helping either.  PSA was 1.44 2/12/24    Additional History: N/A    Procedures Today: N/A    Urinalysis today:  No results found for this visit on 02/18/25.  Last several PSA's:  Lab Results   Component Value Date    PSA 2.16 02/01/2025    PSA 1.44 02/12/2024    PSA 1.11 11/04/2022     Last total testosterone:  Lab Results   Component Value Date    TESTOSTERONE 445 02/01/2025        Last BUN and creatinine:  Lab Results   Component Value Date    BUN 12 05/19/2023     Lab Results   Component Value Date    CREATININE 0.85 05/19/2023       Additional Lab/Culture results: none    Imaging Reviewed during this Office Visit: none  (results were independently reviewed by physician and radiology report

## 2025-03-11 ENCOUNTER — TELEPHONE (OUTPATIENT)
Dept: UROLOGY | Age: 55
End: 2025-03-11

## 2025-03-11 DIAGNOSIS — N40.1 BENIGN PROSTATIC HYPERPLASIA WITH INCOMPLETE BLADDER EMPTYING: ICD-10-CM

## 2025-03-11 DIAGNOSIS — N52.01 ERECTILE DYSFUNCTION DUE TO ARTERIAL INSUFFICIENCY: Primary | ICD-10-CM

## 2025-03-11 DIAGNOSIS — R39.14 BENIGN PROSTATIC HYPERPLASIA WITH INCOMPLETE BLADDER EMPTYING: ICD-10-CM

## 2025-03-11 RX ORDER — TADALAFIL 5 MG/1
5 TABLET ORAL PRN
Qty: 30 TABLET | Refills: 11 | Status: SHIPPED | OUTPATIENT
Start: 2025-03-11

## 2025-03-11 NOTE — TELEPHONE ENCOUNTER
Patient would like for you to send 5 mg of Clalis to the [facility] [place] pharmacy instead of the 20mg Tadalafil because [facility] will not give him the Tadalafil. And at his appointment with you in August the would like you to show him how to do the shot at that time.

## 2025-03-11 NOTE — TELEPHONE ENCOUNTER
I sent cialis. He can take up to 20mg PRN (which would be 4 of the 5mg pills). If he wants to proceed with trimix (the injection)- then please have him call me a couple weeks before his appt so I can place the order at that time and he can be taught at his August appt.

## 2025-03-12 NOTE — TELEPHONE ENCOUNTER
He can only be on sildenafil OR tadalafil, not both. Please clarify which one he prefers.  Sildenafil dosage is 100mg (needs to be taken on empty stomach). Tadalafil is 20mg (can take with or without food). My piror notes indicate he preferred the tadalafil.

## 2025-03-12 NOTE — TELEPHONE ENCOUNTER
Pt called in would like to know if SANTI Nafisa can refill sildenifil for him. Pt states that he does not want medication coming from 2 doctors. Writer advised a message would be sent to Nafisa and the office would follow up.

## 2025-04-30 RX ORDER — TADALAFIL 5 MG/1
5 TABLET ORAL DAILY
Qty: 30 TABLET | Refills: 3 | Status: CANCELLED | OUTPATIENT
Start: 2025-04-30

## 2025-04-30 NOTE — TELEPHONE ENCOUNTER
Patient called in to the office and stated that his   tadalafil (CIALIS) 20 MG tablet was the one that was supposed to be discontinued not the tadalafil (CIALIS) 5 MG tablet.  Patient needs new prescription sent in for the tadalafil (CIALIS) 5 MG tablet.

## 2025-05-01 DIAGNOSIS — R68.82 LOW LIBIDO: ICD-10-CM

## 2025-05-01 DIAGNOSIS — R79.89 LOW TESTOSTERONE: ICD-10-CM

## 2025-05-01 DIAGNOSIS — F40.231 FEAR OF INJECTIONS: ICD-10-CM

## 2025-05-01 DIAGNOSIS — R53.83 OTHER FATIGUE: ICD-10-CM

## 2025-05-01 DIAGNOSIS — N40.1 BENIGN PROSTATIC HYPERPLASIA WITH LOWER URINARY TRACT SYMPTOMS, SYMPTOM DETAILS UNSPECIFIED: Primary | ICD-10-CM

## 2025-05-01 DIAGNOSIS — N52.01 ERECTILE DYSFUNCTION DUE TO ARTERIAL INSUFFICIENCY: ICD-10-CM

## 2025-05-01 RX ORDER — TADALAFIL 5 MG/1
5 TABLET ORAL DAILY
Qty: 90 TABLET | Refills: 3 | Status: SHIPPED | OUTPATIENT
Start: 2025-05-01

## 2025-05-01 RX ORDER — TESTOSTERONE UNDECANOATE 237 MG/1
CAPSULE, LIQUID FILLED ORAL
Qty: 60 CAPSULE | Refills: 5 | Status: SHIPPED | OUTPATIENT
Start: 2025-05-01

## 2025-05-01 NOTE — TELEPHONE ENCOUNTER
Medication name:  JATENZO    Medication dosage:237 MG CAPS   Preferred pharmacy:[facility] - [address] Ricardo TOVAR [phone] - F [phone]     Last office visit:2/18/25  Next office (or follow up plan- may copy and paste from last office visit at bottom):  Assessment & Plan  Hypogonadism is chronic and stable.   Testosterone is 445, on Jatenzo.   He prefers to stick with this, as it is covered.   Will need to repeat labs in 6 mos.   Watch PSA-- patient does have a fhx prostate cancer, we will repeat in 6 mos as well.   Tadalafil 20mg prn, discussed taking 30-60 mins before intercourse with or without food.   We did discuss trimix but he is not interested in proceeding.   Encouraged regular exercise and strength training to help improve T and erections.  F/u 6 mos or sooner if needed.      Return in about 6 months (around 8/18/2025) for fu with labs.    Patient called in to the office and stated that his   tadalafil (CIALIS) 20 MG tablet was the one that was supposed to be discontinued not the tadalafil (CIALIS) 5 MG tablet.  Patient needs new prescription sent in for the tadalafil (CIALIS) 5 MG tablet.

## 2025-08-15 ENCOUNTER — HOSPITAL ENCOUNTER (OUTPATIENT)
Dept: LAB | Age: 55
Discharge: HOME | End: 2025-08-15
Payer: COMMERCIAL

## 2025-08-15 LAB
HCT VFR BLD AUTO: 46.8 % (ref 41–53)
HGB BLD-MCNC: 15.8 G/DL (ref 13.5–17.5)
PSA SERPL-MCNC: 2.15 NG/ML (ref 0–4)
TESTOST SERPL-MCNC: 437 NG/DL (ref 193–740)

## 2025-08-15 PROCEDURE — 84153 ASSAY OF PSA TOTAL: CPT

## 2025-08-15 PROCEDURE — 84403 ASSAY OF TOTAL TESTOSTERONE: CPT

## 2025-08-15 PROCEDURE — 85018 HEMOGLOBIN: CPT

## 2025-08-15 PROCEDURE — 36415 COLL VENOUS BLD VENIPUNCTURE: CPT

## 2025-08-15 PROCEDURE — 85014 HEMATOCRIT: CPT

## 2025-08-19 ENCOUNTER — OFFICE VISIT (OUTPATIENT)
Dept: UROLOGY | Age: 55
End: 2025-08-19
Payer: COMMERCIAL

## 2025-08-19 VITALS
WEIGHT: 243 LBS | TEMPERATURE: 97.4 F | HEART RATE: 76 BPM | HEIGHT: 71 IN | BODY MASS INDEX: 34.02 KG/M2 | OXYGEN SATURATION: 98 % | SYSTOLIC BLOOD PRESSURE: 120 MMHG | DIASTOLIC BLOOD PRESSURE: 78 MMHG

## 2025-08-19 DIAGNOSIS — N52.01 ERECTILE DYSFUNCTION DUE TO ARTERIAL INSUFFICIENCY: ICD-10-CM

## 2025-08-19 DIAGNOSIS — R79.89 LOW TESTOSTERONE: Primary | ICD-10-CM

## 2025-08-19 PROCEDURE — 99214 OFFICE O/P EST MOD 30 MIN: CPT | Performed by: NURSE PRACTITIONER

## 2025-08-19 ASSESSMENT — ENCOUNTER SYMPTOMS
SHORTNESS OF BREATH: 0
BACK PAIN: 0
WHEEZING: 0
EYE REDNESS: 0
ALLERGIC/IMMUNOLOGIC NEGATIVE: 1
EYE PAIN: 0
RESPIRATORY NEGATIVE: 1
EYES NEGATIVE: 1
VOMITING: 0
NAUSEA: 0
ABDOMINAL PAIN: 0
GASTROINTESTINAL NEGATIVE: 1
COLOR CHANGE: 0
COUGH: 0

## 2025-08-27 ENCOUNTER — TELEPHONE (OUTPATIENT)
Dept: UROLOGY | Age: 55
End: 2025-08-27

## (undated) DEVICE — ENDO KIT W/SYRINGE: Brand: MEDLINE INDUSTRIES, INC.

## (undated) DEVICE — GLOVE ORTHO 7 1/2   MSG9475

## (undated) DEVICE — ST CHARLES MINOR ABDOMINAL PK: Brand: MEDLINE INDUSTRIES, INC.

## (undated) DEVICE — GOWN,AURORA,NONREINFORCED,LARGE: Brand: MEDLINE

## (undated) DEVICE — SHEET, T, LAPAROTOMY, STERILE: Brand: MEDLINE

## (undated) DEVICE — SOLUTION IRRIG 1000ML 0.9% SOD CHL USP POUR PLAS BTL

## (undated) DEVICE — SOLUTION IRRIG 1000ML STRL H2O USP PLAS POUR BTL

## (undated) DEVICE — MERCY HEALTH ST CHARLES: Brand: MEDLINE INDUSTRIES, INC.

## (undated) DEVICE — GAUZE,SPONGE,FLUFF,6"X6.75",STRL,5/TRAY: Brand: MEDLINE

## (undated) DEVICE — SUTURE PROL SZ 3-0 L36IN NONABSORBABLE BLU L26MM SH 1/2 CIR 8522H

## (undated) DEVICE — GLOVE ORANGE PI 7 1/2   MSG9075

## (undated) DEVICE — GAUZE,PACKING STRIP,IODOFORM,1/2"X5YD,ST: Brand: CURAD

## (undated) DEVICE — PAD,ABDOMINAL,8"X7.5",ST,LF,20/BX: Brand: MEDLINE INDUSTRIES, INC.

## (undated) DEVICE — DRESSING TRNSPAR W5XL4.5IN FLM SHT SEMIPERMEABLE WIND

## (undated) DEVICE — SUTURE VCRL + SZ 0 L27IN ABSRB WHT CT-2 1/2 CIR TAPERPOINT VCP270H

## (undated) DEVICE — DEFENDO AIR WATER SUCTION AND BIOPSY VALVE KIT FOR  OLYMPUS: Brand: DEFENDO AIR/WATER/SUCTION AND BIOPSY VALVE

## (undated) DEVICE — SINGLE PORT MANIFOLD: Brand: NEPTUNE 2

## (undated) DEVICE — PAD,NON-ADHERENT,3X8,STERILE,LF,1/PK: Brand: MEDLINE